# Patient Record
Sex: FEMALE | Race: BLACK OR AFRICAN AMERICAN
[De-identification: names, ages, dates, MRNs, and addresses within clinical notes are randomized per-mention and may not be internally consistent; named-entity substitution may affect disease eponyms.]

---

## 2019-09-03 NOTE — RAD
Right shoulder 3 views



HISTORY: Right shoulder pain.



FINDINGS: Acromioclavicular and glenohumeral alignment are maintained. Fracture, dislocation, or aggr
essive osseous erosions.











IMPRESSION: No acute osseous abnormalities are demonstrated.



Reported By: MAKAYLA Tompkins 

Electronically Signed:  9/3/2019 8:58 PM

## 2019-09-03 NOTE — RAD
Chest one view



HISTORY: Chest pain.



COMPARISON: 10/7/2017.



FINDINGS: Cardiac silhouette is magnified by projection. Pulmonary vasculature is unremarkable. Media
stinum is midline with aortic calcification. No lobar consolidation or evidence of pneumothorax.

Mild leftward convex curvature of the thoracic spine is unchanged in appearance. Cardiac monitor lead
s overlie the chest.











IMPRESSION: Atherosclerosis. Chronic-type findings appear stable.



No active cardiopulmonary abnormalities are otherwise demonstrated.



Reported By: MAKAYLA Tompkins 

Electronically Signed:  9/3/2019 6:44 PM

## 2019-09-04 NOTE — CT
CT NECK SOFT TISSUES WITH CONTRAST:

 

Date:  09/04/19 

 

HISTORY:  

54-year-old female with multiple enlarged lymph nodes and hoarseness. 

 

COMPARISON:  

None available. 

 

FINDINGS:

There are multiple heterogeneously enhancing, enlarged, pathological supraclavicular cervical lymph n
odes bilaterally. For example, the most anterior left Level IV supraclavicular lymph node measures ap
proximately 2 x 2.5 x 3 cm. Located more superiorly, there are several abnormal left Level V, III, an
d IV cervical lymph nodes. This includes an approximately 2 x 1.5 x 2.5 cm pathologic lymph node that
 straddles left Levels III and IV. 

 

There are suspicious conglomerations of noncalcified enlarged mediastinal lymph nodes, including righ
t paratracheal and pretracheal lymph nodes.

 

Images of the oropharynx are degraded because of patient movement, either vocalization or swallowing.
 The glottis is closed, also limiting evaluation of the larynx. Images of the submandibular glands ar
e also somewhat degraded. Because of motion artifact, it is also difficult to evaluate the pharyngeal
 mucosal space, especially oropharynx. No parotid mass. There is a prominent periapical, radicular cy
st at the right mandibular body. 

 

IMPRESSION: 

Mediastinal, bilateral supraclavicular, and left mid cervical, lymphadenopathy: suspicious and probab
ly malignant.

 

 

 

POS: [facility]

## 2019-09-04 NOTE — CT
CT OF THE CHEST, ABDOMEN AND PELVIS WITH IV CONTRAST:

9/4/19

 

INDICATIONS:

Multiple cervical lymph nodes with hoarseness and history of smoking. 

 

COMPARISON: 

CT of the abdomen and pelvis dated 10/7/17.

 

FINDINGS: 

 

CHEST:

There are numerous enlarged lymph nodes within the base of the left base of the neck. One of the larg
est is seen within the left supraclavicular region measuring 1.6 cm. 

 

There is a right paratracheal lymph node measuring 1.5 cm. There is a subcarinal lymph node measuring
 1.1 cm. 

 

There is scattered emphysema. No suspicious pulmonary nodule is identified. 

 

ABDOMEN AND PELVIS: 

There are small hepatic hypodensities. There are small gallstones within the gallbladder. Pancreas, a
drenal glands, and spleen appear within normal limits. Numerous enlarged lymph nodes within the upper
 retroperitoneal region and upper abdomen.  One of the largest seen within the portacaval region yeimy
uring 1.2 cm. There is a periaortic lymph node measuring 1.1 cm. There are small renal hypodensities 
bilaterally. 

 

The bladder is partially decompressed. The reproductive structures are surgically absent. There is mi
ld free fluid in the pelvis. 

 

There is a mild amount of retained stool within the colon. 

 

There are enlarged lymph nodes within the right upper quadrant mesentery. One of the most largest lym
ph nodes on image 66, series 2 measuring 1.3 cm. There is suggestion of wall thickening involving the
 ileocecal valve with a 1.7 cm lymph node seen within the right lower quadrant. There is wall thicken
ing involving the terminal ileum and cecum. 

 

There is scattered degenerative and osteoarthritic change. 

 

IMPRESSION: 

1.      Extensive lymphadenopathy at base of left neck, mediastinum, upper abdomen, retroperitoneal m
esenteric regions. 

 

2.      Wall thickening involving the cecum, ileocecal valve and terminal ileum may reflect an ileoce
citis. This may reflect changes of underlying inflammatory bowel disease or an infectious ileocecitis
. This also could be related to lymphomatous involvement as there are multiple enlarged lymph nodes i
nvolving the mesenteric lymph nodes near this region. 

 

3.      Mild free fluid.

 

4.      Scattered emphysema. 

 

5.      Tiny hypodensities within the liver and kidneys difficult to fully characterize due to their 
size. 

 

POS: [facility]

## 2019-09-04 NOTE — HP
REASON FOR ADMISSION:  Right-sided chest wall pain, multiple cervical lymph 
nodes

including supraclavicular node with hoarseness. 



HISTORY OF PRESENTING ILLNESS:  The patient gives history of developing right 
upper

extremity numbness.  This was associated with right-sided chest wall pain with

radiation to right upper extremity and shoulder.  This happened at work at 
[employer].  She also complains of hoarseness from last 3 weeks off and on.  She in 
fact

took antibiotics for 10 days with no relief.  She has enlarged lymph nodes, 
which

she shows me on the neck.  Her last stress test was in 2017, which 
showed

excellent exercise tolerance with no significant changes seen on the EKG.  No

complaints of fever at home.  Has currently no left-sided chest pain, 
palpitations,

or PND.  The patient mentions that she has exertional shortness of breath for 
last

few months now. 



PAST MEDICAL AND SURGICAL HISTORY:  History of asthma, hysterectomy, bipolar

disorder which is in remission per patient and is not on any medication, and

osteoporosis. 



CURRENT MEDICATIONS:  She takes:

1. Meloxicam 15 mg p.r.n.

2. Alendronate 70 mg once weekly.



PERSONAL HISTORY:  Smokes 1 pack a day and has been doing so for the last 30 
years.

Uses marijuana.  She also drinks nearly 30 beers on the weekends.  Denies other

drugs. 



FAMILY HISTORY:  Both parents  in their 70s.  Mother had history of bone 
cancer.

 Father  of multiple MIs.  The patient works at [employer].  She 
ambulates

by herself. 



CODE STATUS:  Full.  Power of  is her daughter, Ms. Karri Rojas, 
number

to reach her is [phone]. 



REVIEW OF SYSTEMS:  CONSTITUTIONAL:  Negative for weight loss or gain, ability 
to

conduct usual activities. 

SKIN:  Negative for rash, itching. 

EYES:  Negative for double vision, pain. 

ENT/MOUTH:  Negative for nose bleeding, neck stiffness, pain, tenderness. 

CARDIOVASCULAR:  Negative for palpitations, dyspnea on exertion, orthopnea. 

RESPIRATORY:  Negative for shortness of breath, wheezing, cough, hemoptysis, 
fever

or night sweats. 

GASTROINTESTINAL:  Negative for poor appetite, abdominal pain, heartburn, nausea
,

vomiting, constipation, or diarrhea. 

GENITOURINARY:  Negative for urgency, frequency, dysuria, nocturia. 

MUSCULOSKELETAL:  Negative for pain, swelling. 

NEUROLOGIC/PSYCHIATRIC:  Negative for anxiety, depression. 

ALLERGY/IMMUNOLOGIC:  Negative for skin rash, bleeding tendency.



PHYSICAL EXAMINATION:

GENERAL:  The patient is a 54-year-old female, who is currently not in any acute

distress. 

VITAL SIGNS:  Blood pressure 130/84, pulse 100 per minute, respiratory rate is 
18

per minute, temperature 97.9 degrees Fahrenheit, and saturating 98% on room 
air. 

NECK:  The patient has multiple lymph nodes in the cervical chain on both sides.

Left, there is a supraclavicular enlarged hard lymph node.  She also has 
surrounding

matted lymph nodes as well.  No elevated JVD. 

CARDIOVASCULAR SYSTEM:  S1 and S2 heard.  Regular rhythm. 

HEENT:  Oral cavity, mucous membranes are moist.  The patient appears to have a

possible mass on the lateral aspect of the posterior one-third of tongue.  This 
area

likely has chronic pigmentation as well, which has been present for years per

patient.  No enlargement of tonsils was seen. 

RESPIRATORY:  Air entry 1+ bilateral.  Scattered rhonchi plus no wheezes. 

ABDOMEN:  Soft.  Bowel sounds heard.  No tenderness, rigidity, or guarding. 

EXTREMITIES:  No peripheral edema or calf tenderness. 

VASCULAR SYSTEM:  Peripheral pulses 1+ bilateral.  No ischemic ulcerations or

gangrene. 

CENTRAL NERVOUS SYSTEM:  The patient has hoarseness and mild drooping of the 
left

half of the soft palate.  No other gross focal deficits noted. 

PSYCHIATRIC:  The patient's mood is euthymic.  No hallucinations or delusions.



LABORATORY DATA:  White count of 6, hemoglobin and hematocrit of 14 and 40, 
platelet

count 361, MCV is 97 with 56% neutrophils.  Serum bicarb 18, BUN 4, creatinine 
0.6,

serum glucose 123.  AST 37, ALT 15, and alkaline phosphatase 102.  Troponin x3

negative.  Albumin is 3.8.  A chest x-ray done, one view shows no lobar

consolidation or evidence of pneumothorax.  There is a mild leftward convex

curvature of the thoracic spine.  Right shoulder three view x-rays done, show no

acute osseous abnormalities.  EKG done shows sinus tach at 109 beats per minute 
with

poor R-wave progression. 



CLINICAL IMPRESSION AND PLAN:  The patient is currently under observation on

telemetry.  We will obtain CT of the chest, abdomen, and pelvis along with soft

tissue neck.  The patient has enlarged supraclavicular lymph node, which is hard

along with other lymph nodes and hoarseness as well.  She is a heavy smoker as 
well

and drinks heavily on the weekends as well.  She will be kept n.p.o.  I have

discussed her findings with Dr. Alan Jose, who will evaluate the patient.  
We will

follow up on the CAT scan results.  If the patient were to have abnormal 
findings,

she will be switched over to inpatient status for further workup.  She will be 
on

DuoNeb q.6 hourly, Pepcid 20 mg twice daily, and gentle hydration with normal 
saline

at 80 mL per hour.  We will also consult Dr. Doss, who is on-call for GI 
for

possible upper endoscopy.  The patient likely will need triple endoscopy if her 
CAT

scan shows any abnormalities. 







Job ID:  [id number]



[facility]

## 2019-09-05 NOTE — CON
DATE OF CONSULTATION:  2019



REASON FOR CONSULTATION:  Abdominal discomfort.



HISTORY OF PRESENT ILLNESS:  Ms. Michael Rojas is a fragile looking 

female hospitalized in the ER today.  The patient came to the ER, because of pain

over the right shoulder joint and also diffuse numbness over the right arm.  The

patient hospitalized, because of the above reason.  The patient was hospitalized 2

years ago with numbness and pain over the left side of chest, left shoulder, left

arm.  At that time, she had a MRI of the head and CT of the head was negative.  She

also had chest pain at that time and she had negative stress testing.  During the

last admission, patient noted to have markedly elevated blood pressure.  However,

she says her blood pressure did normalize since that discharge and she is not taking

any medication.  The patient has history of bipolar disorder.  The patient also had

previous hysterectomy in the past.  The patient tells me the pain started 24 hours

earlier and the pain is over the right shoulder area.  She also has numbness over

the right arm and right hand.  The patient has no headache, but for the pain and

numbness of the right shoulder and arm, she has no other specific symptoms.  No

headache.  No diplopia.  No hearing loss.  No new neurologic symptoms.  The patient

was seen by Dr. Daugherty and on physical exam, she was found to be mildly tender

all over abdomen.  However, she states she has no abdominal pain, but when somebody

feels her abdomen, she is sore.  History of asthma, COPD, has chronic cough.  She

states she has been coughing a lot recently.  She feels that the pain and the

soreness was because of the chronic coughing.  Her bowel movements are regular.

There is no abdominal pain, no nausea, vomiting.  No history of any heartburn, no

indigestion.  No rectal bleeding.  No melena.  She really has no GI symptoms. 



ALLERGIES:  NONE.



SOCIAL HISTORY:  The patient smokes marijuana frequently.  She also smokes one pack

of cigarettes per day.  She drinks 2-3 beers several times a week. 



MEDICAL ILLNESSES:  

1. Bipolar disorder.

2. Hysterectomy.

3. Admission in 2017 for numbness of the left arm and had a negative CAT scan of the

head and MRA.  She also had a negative stress test at that time. 



FAMILY HISTORY:  Mother had bone cancer.  Father  of coronary artery disease.



MEDICATIONS UPON ADMISSION:  Include:

1. Meloxicam 50 mg p.r.n.

2. Fosamax 70 once a week.



REVIEW OF SYSTEMS:  A 10-point system review: 

CONSTITUTIONAL:  No history of any weight loss.  No fever.  She has good exercise

tolerance. 

HEENT:  No headache.  No dizziness.  Eyes:  No diplopia, no impaired vision.  Ears:

No hearing loss, no pain. No nosebleed.  No sore throat. 

LUNGS:  History of asthma, chronic coughing and wheezing. 

CARDIOVASCULAR:  No chest pain.  No palpitation.  No orthopnea or PND. 

GI:  No abdominal pain, no nausea, no vomiting, no hematochezia.  However, when

palpated, she feels mildly sore. 

:  Nonrelevant.   

NEUROPSYCHIATRIC:  History of bipolar disorder.   

ENDOCRINE/HEMATOLOGICAL:  Nothing relevant.



PHYSICAL EXAMINATION:

GENERAL:  She appears very comfortable.  She is thin built, in no distress.   

VITAL SIGNS:  Afebrile, pulse is 87, blood pressure is 188/87. 

EYES:  Conjunctivae clear. 

NECK:  Supple.  No thyromegaly noted. 

CARDIOVASCULAR:  First and second heart sounds heard. 

LUNGS:  Clear to auscultation. 

ABDOMEN:  Soft.  She has an umbilical hernia, reviewed.  Abdomen is mildly swollen

all over the abdomen.  There is no rebound or guarding.  No organomegaly or masses.

Bowel sounds are normal. 

EXTREMITIES:  Reveal no edema.



IMAGING:  Chest CAT scan, abdominal CAT scan shows diffuse lymphadenopathy in the

neck within the chest and also retroperitoneum. 



CLINICAL IMPRESSION:  A 54-year-old  female with pain over the right

shoulder with numbness  __________ She had similar episodes of left-sided numbness

and pain 2 years ago.  At that time, a CAT scan of the head and MRI were negative.

She was found to have chest pain at that time and she had negative stress testing.

She has no abdominal symptoms except feeling sore in her abdomen.  It could very

well be because of chronic coughing, the exam is very benign. 



LABORATORY DATA:  Shows today WBC 6700, hemoglobin 14.2, hematocrit 40.2, MCV 97.8,

platelet count is 361,000, polymorphs 56, lymphocytes 24, monocytes 7, serum

chemistries are normal.  Chem 7:  Glucose 123, calcium 9.1, bilirubin 0.3, AST is

37, ALT 15, alkaline phosphatase is 102, albumin is 3.8. 



IMAGING:  The CAT scan of the abdomen and chest does reveal diffuse lymphadenopathy

including the neck, retroperitoneal area and also chest.  She also has wall

thickening of the right colon, but she has really no specific GI symptoms.  She also

has  __________. 



CLINICAL IMPRESSION:  

1. A 54-year-old  female with pain over the right shoulder with some

numbness in the right hand.  She has had an abdominal CAT scan and chest CAT scan.

It shows diffuse lymphadenopathy.  At the present time, the abdominal exam is very

benign, she does have umbilical hernia, which I reviewed.  Abdomen is mildly tender,

nonspecific. 

2. History of bipolar disorder.

3. Hysterectomy.

4. Drug use-chronic smoker.



RECOMMENDATION:  Surgical consult for  __________ biopsy.  I believe she probably

should have some endoscopic studies before discharge, because of her age and she

needs screening colonoscopy.   __________ biopsy and then decide course of further

therapy. 







Job ID:  [id number]

## 2019-09-05 NOTE — CON
DATE OF CONSULTATION:  



SUBJECTIVE:  Ms. Rojas was admitted for a mass in the supraclavicular region of her

neck.  She has a history of smoking, was asked for ENT consult to assess whether

there is a primary source at the base of tongue or in the pharyngeal region. 



OBJECTIVE:  GENERAL:  The patient is well developed, well nourished.  She is in no

acute distress. 

VITAL SIGNS:  Stable. 

NECK:  Palpation of the neck reveals left-sided supraclavicular mass, it is

approximately 4 cm, hard, non-mobile, slightly fixed.  Flexible scope was used to

look at the entire nasopharyngeal region including larynx, base of tongue.  No

lesions were seen in any of these areas. 



ASSESSMENT:  

1. Neck mass, supraclavicular.

2. History of smoking.



PLAN:  Okay to discharge to home at this time per Ear, Nose, and Throat.  Dr. Jose

will perform an excisional biopsy of mass on Wednesday, 09/11.  The patient may come

to the office for preop visit if desired. 







Job ID:  [id number]

## 2019-09-05 NOTE — DIS
DATE OF ADMISSION:  09/03/2019



DATE OF DISCHARGE:  09/05/2019



PRIMARY CARE PHYSICIAN:  Dr. Shen montgomery.



DISCHARGE DISPOSITION:  Home.



PRIMARY DISCHARGE DIAGNOSES:  Multiple cervical lymph nodes, mediastinal

lymphadenopathy, and upper abdomen and retroperitoneal mesenteric region

adenopathies as well, for further evaluation to rule out malignancy. 



SECONDARY DISCHARGE DIAGNOSES:  Hoarseness of voice, likely due to impingement 
of

recurrent laryngeal nerve by lymphadenopathy, hypertension, suspected chronic

obstructive pulmonary disease, tobacco abuse. 



PROCEDURES DONE DURING HOSPITALIZATION:  CT neck soft tissues with contrast done

showed mediastinal, bilateral supraclavicular, and left mid cervical

lymphadenopathy, suspicious and probably malignant.  CT chest, abdomen, and 
pelvis

with contrast done showed extensive lymphadenopathy at base of left neck,

mediastinum, upper abdomen, retroperitoneal, mesenteric regions.  She also had 
some

wall thickening involving cecum, ileocecal valve, and terminal ileum.  Scattered

emphysema was seen.  Chest x-ray showed chronic findings, otherwise no acute

infiltrate.  The right shoulder three-view x-ray done showed no acute osseous

abnormality.  No fracture, dislocation, or aggressive osseous erosions were 
seen.

Respiratory virus panel PCR was negative.  Nasopharyngeal swab grew normal

respiratory akira.  H and H of 13 and 39, platelet count 315.  BUN 4, creatinine

0.5.  Troponin x3 negative.  Liver enzymes were within normal limits.  Total

bilirubin 0.6. 



DISCHARGE MEDICATION:  Albuterol inhaler q.6 hourly p.r.n.



INPATIENT CONSULT:  

1. Dr. Alan Jose for ENT.

2. Dr. Doss for Gastroenterology.



BRIEF COURSE DURING HOSPITALIZATION:  The patient initially came in with 
complaints

of right shoulder pain and chest pain.  She was also found to be having palpable

cervical lymphadenopathy including supraclavicular nodes and hoarseness.  The

patient has had CT neck and CT of the chest, abdomen, and pelvis obtained, which

have all revealed multiple lymph nodes suspicious for malignancy.  She has had 
ENT

evaluation done by Dr. Alan Jose.  The patient is for cervical lymph node

excision biopsy on Wednesday.  She has been scheduled for outpatient surgery in 
the

OR by Dr. Alan Jose.  Once tissue is available, further diagnostic workup 
will

be done.  Likely, the patient will need triple endoscopy if this turns out to be

squamous cell carcinoma.  If not, this is highly suspicious for lymphoma.  Ms. Aruna Morrissey nurse practitioner for Oncology is also aware of the patient, and ENT

physician, Dr. Alan Jose will consult outpatient Oncology if this turns out 
to

be malignant.  She has been given a prescription for albuterol inhaler.  She was

counseled with regard to tobacco cessation.  Also, binge drinking on the 
weekends,

she was counseled with regard to that as well.  She is otherwise hemodynamically

stable and will be shortly discharged home to follow up with Dr. Alan Jose 
on

Wednesday for excision biopsy of the cervical lymph node. Please note I have 
seen

and examined patient on the day of discharge.







Job ID:  [id number]



[facility]

## 2019-09-05 NOTE — PDOC.HOSPP
- Subjective


Encounter Date: 09/05/19


Encounter Time: 11:00


Subjective: 





no trouble swallowing or breathing





- Objective


Vital Signs & Weight: 


 Vital Signs (12 hours)











  Temp Pulse Resp BP Pulse Ox


 


 09/05/19 08:00  97.8 F  74  18  136/74  98


 


 09/05/19 07:56   83  16   99


 


 09/05/19 03:32  98.5 F  82  16  144/81 H  98








 Weight











Admit Weight                   102 lb 9.615 oz


 


Weight                         102 lb 9.615 oz














I&O: 


 











 09/04/19 09/05/19 09/06/19





 06:59 06:59 06:59


 


Intake Total 120 1200 


 


Balance 120 1200 











Result Diagrams: 


 09/05/19 04:26





 09/05/19 04:26





Hospitalist ROS





- Medication


Medications: 


Active Medications











Generic Name Dose Route Start Last Admin





  Trade Name Freq  PRN Reason Stop Dose Admin


 


Albuterol/Ipratropium  3 ml  09/04/19 13:00  09/05/19 07:56





  Duoneb  NEB   3 ml





  [id number] OWEN   Administration





     





     





     





     


 


Enoxaparin Sodium  40 mg  09/05/19 09:00  09/05/19 08:55





  Lovenox  SC   Not Given





  0900 OWEN   





     





     





     





     


 


Famotidine  20 mg  09/04/19 21:00  09/05/19 08:56





  Pepcid  PO   20 mg





  BID OWEN   Administration





     





     





     





     


 


Sodium Chloride  1,000 mls @ 80 mls/hr  09/04/19 09:49  09/05/19 01:00





  Normal Saline 0.9%  IV   1,000 mls





  .[id number] OWEN   Administration





     





     





     





     














- Exam


General Appearance: awake alert, ill appearing


Eye: PERRL, anicteric sclera


ENT: normocephalic atraumatic, moist mucosa


Neck: no JVD


Neck - other findings: multiple lymph nodes including supraclavicular


Heart: RRR, no gallops


Respiratory: no wheezes, no rales, rhonchi


Gastrointestinal: soft, non-tender, normal bowel sounds


Extremities: no cyanosis, no edema


Neurological: CN's grossly intact, no focal deficits


Psychiatric: normal affect, A&O x 3





Hosp A/P


(1) Cervical lymphadenopathy


Code(s): R59.0 - LOCALIZED ENLARGED LYMPH NODES   Status: Acute   





(2) COPD (chronic obstructive pulmonary disease)


Status: Suspected   


Qualifiers: 


   COPD type: chronic bronchitis 





(3) HTN (hypertension)


Code(s): I10 - ESSENTIAL (PRIMARY) HYPERTENSION   Status: Chronic   


Qualifiers: 


   Hypertension type: essential hypertension   Qualified Code(s): I10 - 

Essential (primary) hypertension   





(4) Hoarseness of voice


Status: Acute   





(5) Tobacco abuse


Code(s): Z72.0 - TOBACCO USE   Status: Chronic   





- Plan





ENT will evaluate her today


Dr.Steven Jose has posted her for lymph node biopsy on wednesday


will eventually need triple endoscopy


has multiple lymph nodes in neck, mediastinum and abd as well suggestive of 

malignancy


DC plan per ENT advice this afternoon, they will have to refer her to Onc/

respective consults based on Bx results.


hemostable


d/w patient in detail about CT and clinical findings and upcoming test (Bx)

## 2019-09-27 NOTE — PET
Exam: 

PET SCAN WITH CT ATTENUATION CORRECTION:



HISTORY: Left neck mass, possibly metastatic lymph node from a primary colon cancer.



COMPARISON: None.



CORRELATION: Soft tissue neck, chest/abdomen and pelvic CT 9/4/2019.



TECHNIQUE: PET scan with CT attenuation correction performed from the base of the brain to the proxim
al thighs following the intravenous administration of 10.3 mCi of [id number].



FINDINGS:



Head/Neck:

Hypermetabolic left periclavicular lymph nodes with a maximum SUV between 5.2 and 6.9.



Chest:

Enlarged hypermetabolic right paratracheal lymph node with a maximum SUV of 3.8, precarinal lymph nod
e with a maximum SUV of 4.2, subcarinal lymph node with a maximum SUV of 2.9.   CT used for

attenuation correction does not demonstrate any lung masses or nodules. 



Abdomen/Pelvis:

Increased retroperitoneal FDG avidity involving periaortic and aortocaval lymph nodes. Representative
 hypermetabolic lymph nodes are noted left periaortic region. The greatest degree of FDG avidity is

4.0. Additional lymph nodes have a maximum SUV between 2.3 and 2.7 suggesting upper normal/slightly h
ypermetabolic lymph nodes. 

There is increased FDG avidity involving lymph nodes within the mesentery with maximum SUV between  3
.4 and 4.6.

There is FDG avidity involving the right hemicolon. Recent CT described mucosal thickening. FDG avidi
ty in this region is 4.0. Correlate for infectious, inflammatory or neoplastic process.

CT used for attenuation correction demonstrates a ventral abdominal wall hernia.

There is a hypermetabolic left external iliac lymph node with maximum  SUV of 3.4.



Osseous structures: 

No abnormal FDG avidity.



IMPRESSION:

1. Hypermetabolic lymph node  left periclavicular region.

2. Hypermetabolic mediastinal lymph nodes.

3. Hypermetabolic mesenteric and retroperitoneal lymph nodes.

4. Hypermetabolic left external iliac lymph node.

5. Hypermetabolic activity involving the right hemicolon. CT used for attenuation correction demonstr
ates bowel wall thickening. Correlate for infectious, inflammatory or malignant process.



Transcribed Date/Time: 9/27/2019 11:18 AM



Reported By: Shon Pinedo 

Electronically Signed:  9/27/2019 1:56 PM

## 2019-10-03 ENCOUNTER — HOSPITAL ENCOUNTER (OUTPATIENT)
Dept: HOSPITAL 92 - SDC | Age: 54
Discharge: HOME | End: 2019-10-03
Attending: SURGERY
Payer: COMMERCIAL

## 2019-10-03 DIAGNOSIS — Z79.899: ICD-10-CM

## 2019-10-03 DIAGNOSIS — Z79.1: ICD-10-CM

## 2019-10-03 DIAGNOSIS — J44.9: ICD-10-CM

## 2019-10-03 DIAGNOSIS — C79.9: ICD-10-CM

## 2019-10-03 DIAGNOSIS — M81.0: ICD-10-CM

## 2019-10-03 DIAGNOSIS — F17.210: ICD-10-CM

## 2019-10-03 DIAGNOSIS — C18.9: Primary | ICD-10-CM

## 2019-10-03 LAB
ANION GAP SERPL CALC-SCNC: 12 MMOL/L (ref 10–20)
BASOPHILS # BLD AUTO: 0.1 THOU/UL (ref 0–0.2)
BASOPHILS NFR BLD AUTO: 0.7 % (ref 0–1)
BUN SERPL-MCNC: 6 MG/DL (ref 9.8–20.1)
CALCIUM SERPL-MCNC: 9.2 MG/DL (ref 7.8–10.44)
CHLORIDE SERPL-SCNC: 112 MMOL/L (ref 98–107)
CO2 SERPL-SCNC: 22 MMOL/L (ref 22–29)
CREAT CL PREDICTED SERPL C-G-VRATE: 74 ML/MIN (ref 70–130)
EOSINOPHIL # BLD AUTO: 0.9 THOU/UL (ref 0–0.7)
EOSINOPHIL NFR BLD AUTO: 11.6 % (ref 0–10)
GLUCOSE SERPL-MCNC: 94 MG/DL (ref 70–105)
HGB BLD-MCNC: 13.8 G/DL (ref 12–16)
LYMPHOCYTES # BLD: 1.9 THOU/UL (ref 1.2–3.4)
LYMPHOCYTES NFR BLD AUTO: 24.7 % (ref 21–51)
MCH RBC QN AUTO: 33.1 PG (ref 27–31)
MCV RBC AUTO: 96.2 FL (ref 78–98)
MONOCYTES # BLD AUTO: 0.6 THOU/UL (ref 0.11–0.59)
MONOCYTES NFR BLD AUTO: 7.8 % (ref 0–10)
NEUTROPHILS # BLD AUTO: 4.1 THOU/UL (ref 1.4–6.5)
NEUTROPHILS NFR BLD AUTO: 55.3 % (ref 42–75)
PLATELET # BLD AUTO: 401 THOU/UL (ref 130–400)
POTASSIUM SERPL-SCNC: 3.9 MMOL/L (ref 3.5–5.1)
RBC # BLD AUTO: 4.17 MILL/UL (ref 4.2–5.4)
SODIUM SERPL-SCNC: 142 MMOL/L (ref 136–145)
WBC # BLD AUTO: 7.5 THOU/UL (ref 4.8–10.8)

## 2019-10-03 PROCEDURE — 85025 COMPLETE CBC W/AUTO DIFF WBC: CPT

## 2019-10-03 PROCEDURE — 0JH63WZ INSERTION OF TOTALLY IMPLANTABLE VASCULAR ACCESS DEVICE INTO CHEST SUBCUTANEOUS TISSUE AND FASCIA, PERCUTANEOUS APPROACH: ICD-10-PCS | Performed by: SURGERY

## 2019-10-03 PROCEDURE — 80048 BASIC METABOLIC PNL TOTAL CA: CPT

## 2019-10-03 PROCEDURE — 71045 X-RAY EXAM CHEST 1 VIEW: CPT

## 2019-10-03 PROCEDURE — C1788 PORT, INDWELLING, IMP: HCPCS

## 2019-10-03 PROCEDURE — 36415 COLL VENOUS BLD VENIPUNCTURE: CPT

## 2019-10-03 PROCEDURE — 02HV33Z INSERTION OF INFUSION DEVICE INTO SUPERIOR VENA CAVA, PERCUTANEOUS APPROACH: ICD-10-PCS | Performed by: SURGERY

## 2019-10-03 NOTE — PDOC.OP
Operative Note





- Operative Note


Operative Note: 





PROCEDURE:  Right internal jugular MediPort placement with ultrasound and 

fluoroscopic guidance





DATE OF PROCEDURE: 





SURGEON: Dacia Tillman M.D.





PREOPERATIVE DIAGNOSIS: Metastatic colon cancer





POSTOPERATIVE DIAGNOSIS: Metastatic colon cancer





HISTORY: Patient has been diagnosed with metastatic colon cancer. Chemotherapy 

has been recommended for palliative purposes and a Mediport has been requested 

for this. 





OPERATIVE PROCEDURE IN DETAIL:  After informed consent was obtained and 

appropriate preoperative antibiotics administered, the patient was taken to the 

operating room and placed in supine position and monitored anesthesia care was 

administered. The patient was then placed in Trendelenburg position and the 

patent compressible right internal jugular vein accessed easily on the first 

attempt under direct ultrasound guidance with excellent flow of dark venous non-

pulsatile blood. There were some enlarged cervical lymph nodes surrounding the 

vein but the vein was patent.  A wire threaded easily and was confirmed to be 

in the compressible vein by ultrasound and with the tip in the superior vena 

cava by fluoroscopy.  Additional local anesthesia was infused to the skin and 

subcutaneous tissues of the right neck and chest.  A skin incision was made on 

the right chest and a subcutaneous pocket developed inferiorly. A Mediport was 

obtained and confirmed to fit in the subcutaneous pocket.  This was secured 

inferiorly to the pectoralis fascia with a Prolene suture, which was clamped, 

but not tied.  Mediport tubing was then tunneled from the chest to the right IJ 

access site subcutaneously. The dilator and sheath were then placed over the 

wire and the dilator and wire removed leaving the sheath in place.  The clamped 

MediPort tubing was tunneled through the sheath, which was then split and 

removed leaving the MediPort tubing in place.  The tubing was adjusted until 

the tip was confirmed by fluoroscopy to be in the superior vena cava just above 

the atrium.  The tubing was clamped at the skin  level and cut and the tubing 

secured to the port, which was then placed in the subcutaneous pocket.  The 

previously placed suture was secured and two additional sutures were placed to 

fix the port in place within the pocket.  The port was aspirated with the Mustafa 

needle and had excellent flow of dark venous non-pulsatile blood and easily 

flushed without resistance.  The subcutaneous tissues were closed with a 

running Monocryl suture, following which the skin was closed with a running 

subcuticular Monocryl suture.  Dermabond dressings were placed.  The course of 

the catheter was confirmed by fluoroscopy to be smooth with the tip 

appropriately located in the superior vena cava.  The patient was taken back to 

recovery in good condition.  Estimated blood loss was minimal.  There were no 

complications.  There were no specimens.

## 2019-10-07 ENCOUNTER — HOSPITAL ENCOUNTER (OUTPATIENT)
Dept: HOSPITAL 92 - ONC/OP | Age: 54
Discharge: HOME | End: 2019-10-07
Attending: INTERNAL MEDICINE
Payer: COMMERCIAL

## 2019-10-07 DIAGNOSIS — Z79.899: ICD-10-CM

## 2019-10-07 DIAGNOSIS — Z51.11: Primary | ICD-10-CM

## 2019-10-07 DIAGNOSIS — C18.8: ICD-10-CM

## 2019-10-07 PROCEDURE — 96367 TX/PROPH/DG ADDL SEQ IV INF: CPT

## 2019-10-07 PROCEDURE — 96415 CHEMO IV INFUSION ADDL HR: CPT

## 2019-10-07 PROCEDURE — 96417 CHEMO IV INFUS EACH ADDL SEQ: CPT

## 2019-10-07 PROCEDURE — 96375 TX/PRO/DX INJ NEW DRUG ADDON: CPT

## 2019-10-07 PROCEDURE — 96413 CHEMO IV INFUSION 1 HR: CPT

## 2019-10-21 ENCOUNTER — HOSPITAL ENCOUNTER (OUTPATIENT)
Dept: HOSPITAL 92 - ONC/OP | Age: 54
Discharge: HOME | End: 2019-10-21
Attending: INTERNAL MEDICINE
Payer: COMMERCIAL

## 2019-10-21 VITALS — DIASTOLIC BLOOD PRESSURE: 80 MMHG | SYSTOLIC BLOOD PRESSURE: 160 MMHG

## 2019-10-21 DIAGNOSIS — Z51.11: Primary | ICD-10-CM

## 2019-10-21 DIAGNOSIS — C77.0: ICD-10-CM

## 2019-10-21 DIAGNOSIS — C18.8: ICD-10-CM

## 2019-10-21 LAB
ALBUMIN SERPL BCG-MCNC: 3.7 G/DL (ref 3.5–5)
ALP SERPL-CCNC: 103 U/L (ref 40–110)
ALT SERPL W P-5'-P-CCNC: 9 U/L (ref 8–55)
ANION GAP SERPL CALC-SCNC: 10 MMOL/L (ref 10–20)
AST SERPL-CCNC: 19 U/L (ref 5–34)
BASOPHILS # BLD AUTO: 0.1 THOU/UL (ref 0–0.2)
BASOPHILS NFR BLD AUTO: 0.9 % (ref 0–1)
BILIRUB SERPL-MCNC: 0.2 MG/DL (ref 0.2–1.2)
BUN SERPL-MCNC: 6 MG/DL (ref 9.8–20.1)
CALCIUM SERPL-MCNC: 9.2 MG/DL (ref 7.8–10.44)
CHLORIDE SERPL-SCNC: 105 MMOL/L (ref 98–107)
CO2 SERPL-SCNC: 26 MMOL/L (ref 22–29)
CREAT CL PREDICTED SERPL C-G-VRATE: 79 ML/MIN (ref 70–130)
EOSINOPHIL # BLD AUTO: 0.3 THOU/UL (ref 0–0.7)
EOSINOPHIL NFR BLD AUTO: 4.4 % (ref 0–10)
GLOBULIN SER CALC-MCNC: 3.8 G/DL (ref 2.4–3.5)
GLUCOSE SERPL-MCNC: 89 MG/DL (ref 70–105)
HGB BLD-MCNC: 13.2 G/DL (ref 12–16)
LYMPHOCYTES # BLD: 2.1 THOU/UL (ref 1.2–3.4)
LYMPHOCYTES NFR BLD AUTO: 28.6 % (ref 21–51)
MCH RBC QN AUTO: 32.7 PG (ref 27–31)
MCV RBC AUTO: 96.1 FL (ref 78–98)
MONOCYTES # BLD AUTO: 0.7 THOU/UL (ref 0.11–0.59)
MONOCYTES NFR BLD AUTO: 10.2 % (ref 0–10)
NEUTROPHILS # BLD AUTO: 4 THOU/UL (ref 1.4–6.5)
NEUTROPHILS NFR BLD AUTO: 56 % (ref 42–75)
PLATELET # BLD AUTO: 372 THOU/UL (ref 130–400)
POTASSIUM SERPL-SCNC: 3.8 MMOL/L (ref 3.5–5.1)
RBC # BLD AUTO: 4.05 MILL/UL (ref 4.2–5.4)
SODIUM SERPL-SCNC: 137 MMOL/L (ref 136–145)
WBC # BLD AUTO: 7.2 THOU/UL (ref 4.8–10.8)

## 2019-10-21 PROCEDURE — 96415 CHEMO IV INFUSION ADDL HR: CPT

## 2019-10-21 PROCEDURE — 96375 TX/PRO/DX INJ NEW DRUG ADDON: CPT

## 2019-10-21 PROCEDURE — 85025 COMPLETE CBC W/AUTO DIFF WBC: CPT

## 2019-10-21 PROCEDURE — 96367 TX/PROPH/DG ADDL SEQ IV INF: CPT

## 2019-10-21 PROCEDURE — 96417 CHEMO IV INFUS EACH ADDL SEQ: CPT

## 2019-10-21 PROCEDURE — 96413 CHEMO IV INFUSION 1 HR: CPT

## 2019-10-21 PROCEDURE — 80053 COMPREHEN METABOLIC PANEL: CPT

## 2019-10-21 RX ADMIN — LEUCOVORIN CALCIUM SCH MLS: 350 INJECTION, POWDER, LYOPHILIZED, FOR SOLUTION INTRAMUSCULAR; INTRAVENOUS at 10:15

## 2019-10-21 RX ADMIN — LEUCOVORIN CALCIUM SCH MLS: 350 INJECTION, POWDER, LYOPHILIZED, FOR SOLUTION INTRAMUSCULAR; INTRAVENOUS at 10:24

## 2019-11-04 ENCOUNTER — HOSPITAL ENCOUNTER (OUTPATIENT)
Dept: HOSPITAL 92 - ONC/OP | Age: 54
Discharge: HOME | End: 2019-11-04
Attending: INTERNAL MEDICINE
Payer: COMMERCIAL

## 2019-11-04 VITALS — SYSTOLIC BLOOD PRESSURE: 141 MMHG | TEMPERATURE: 98 F | DIASTOLIC BLOOD PRESSURE: 90 MMHG

## 2019-11-04 DIAGNOSIS — C18.8: ICD-10-CM

## 2019-11-04 DIAGNOSIS — C77.0: ICD-10-CM

## 2019-11-04 DIAGNOSIS — Z51.11: Primary | ICD-10-CM

## 2019-11-04 PROCEDURE — 96415 CHEMO IV INFUSION ADDL HR: CPT

## 2019-11-04 PROCEDURE — 96417 CHEMO IV INFUS EACH ADDL SEQ: CPT

## 2019-11-04 PROCEDURE — 96367 TX/PROPH/DG ADDL SEQ IV INF: CPT

## 2019-11-04 PROCEDURE — 82378 CARCINOEMBRYONIC ANTIGEN: CPT

## 2019-11-04 PROCEDURE — 96375 TX/PRO/DX INJ NEW DRUG ADDON: CPT

## 2019-11-04 PROCEDURE — 96413 CHEMO IV INFUSION 1 HR: CPT

## 2019-12-09 ENCOUNTER — HOSPITAL ENCOUNTER (OUTPATIENT)
Dept: HOSPITAL 92 - ONC/OP | Age: 54
Discharge: HOME | End: 2019-12-09
Attending: INTERNAL MEDICINE
Payer: COMMERCIAL

## 2019-12-09 VITALS — TEMPERATURE: 98.4 F | DIASTOLIC BLOOD PRESSURE: 78 MMHG | SYSTOLIC BLOOD PRESSURE: 125 MMHG

## 2019-12-09 DIAGNOSIS — Z51.11: Primary | ICD-10-CM

## 2019-12-09 DIAGNOSIS — C77.0: ICD-10-CM

## 2019-12-09 DIAGNOSIS — C18.8: ICD-10-CM

## 2019-12-09 PROCEDURE — 96367 TX/PROPH/DG ADDL SEQ IV INF: CPT

## 2019-12-09 PROCEDURE — 80053 COMPREHEN METABOLIC PANEL: CPT

## 2019-12-09 PROCEDURE — 96415 CHEMO IV INFUSION ADDL HR: CPT

## 2019-12-09 PROCEDURE — 96413 CHEMO IV INFUSION 1 HR: CPT

## 2019-12-09 PROCEDURE — 96375 TX/PRO/DX INJ NEW DRUG ADDON: CPT

## 2019-12-09 PROCEDURE — 84550 ASSAY OF BLOOD/URIC ACID: CPT

## 2019-12-09 PROCEDURE — 82248 BILIRUBIN DIRECT: CPT

## 2019-12-09 PROCEDURE — 82378 CARCINOEMBRYONIC ANTIGEN: CPT

## 2019-12-09 PROCEDURE — 36415 COLL VENOUS BLD VENIPUNCTURE: CPT

## 2019-12-09 PROCEDURE — 83615 LACTATE (LD) (LDH) ENZYME: CPT

## 2019-12-09 PROCEDURE — 96417 CHEMO IV INFUS EACH ADDL SEQ: CPT

## 2019-12-09 PROCEDURE — 84100 ASSAY OF PHOSPHORUS: CPT

## 2019-12-20 ENCOUNTER — HOSPITAL ENCOUNTER (OUTPATIENT)
Dept: HOSPITAL 92 - BICCT | Age: 54
Discharge: HOME | End: 2019-12-20
Attending: INTERNAL MEDICINE
Payer: MEDICAID

## 2019-12-20 DIAGNOSIS — C18.8: Primary | ICD-10-CM

## 2019-12-20 DIAGNOSIS — C77.0: ICD-10-CM

## 2019-12-20 DIAGNOSIS — K22.8: ICD-10-CM

## 2019-12-20 DIAGNOSIS — R59.0: ICD-10-CM

## 2019-12-20 DIAGNOSIS — K63.89: ICD-10-CM

## 2019-12-20 DIAGNOSIS — K44.9: ICD-10-CM

## 2019-12-20 PROCEDURE — 71260 CT THORAX DX C+: CPT

## 2019-12-20 PROCEDURE — 74177 CT ABD & PELVIS W/CONTRAST: CPT

## 2019-12-20 NOTE — CT
CT CHEST AND ABDOMEN AND PELVIS WITH IV CONTRAST:

 

Oral contrast was given. Multiplanar reconstruction. 

 

INDICATION:

Malignant neoplasm colon. Secondary malignant neoplasm of left neck, supraclavicular, and upper thora
cic lymph nodes. 

 

COMPARISON:  

CT chest, abdomen, and pelvis dated 09/04/19. 

 

FINDINGS:

 

CT CHEST:

The adenopathy in the left supraclavicular/lower left neck region is again noted. A large lymph node 
on the left seen previously posterior to the left IVC compressing the IVC is again seen, but appears 
to have decreased in size. It measures 1.2 width today with prior measurement of 1.6 cm. An adjacent 
lymph node just posterior to this larger node is also decreased in size, measuring approximately 1.0 
cm today, with prior measurements approximately 1.8 cm AP. 

 

Adenopathy in the mediastinum is again seen. The paratracheal adenopathy is again noted, although it 
is less bulky today when compared to the recent exam of 09/04/19. A large paratracheal lymph node is 
measured at 1.3 cm today, with prior measurements approximately 1.5 cm. Other adjacent lymph nodes ar
e slightly less pronounced. A right subcarinal lymph node has AP dimension of 1.0 cm and this is unch
anged. 

 

Lung fields remain clear. No infiltrate or effusion. 

 

Nonspecific axillary lymph nodes are unchanged. 

 

The thoracic aorta is opacified and is unremarkable. The pulmonary arteries are well opacified and th
ere is no evidence of proximal pulmonary embolus. 

 

Osseous structures unremarkable. 

 

IMPRESSION: 

Adenopathy in the left lower neck and mediastinum again noted. There has been mild decrease when comp
ared to prior exam of 09/04/19 as noted above. 

 

 

CT ABDOMEN AND PELVIS:

Liver again shows a few scattered tiny hypodensities as noted before. These are unchanged and probabl
y represent tiny hepatic cystic lesions. The liver, spleen, and pancreas are otherwise unremarkable a
nd unchanged. There is evidence of mural thickening of the distal esophagus at the EG junction with s
mall sliding diaphragmatic hernia. Prominent wall thickening in the upper stomach is also seen. These
 findings appear stable from prior study. 

 

Kidneys unremarkable with small cystic lesions which appear stable. 

 

Mild nonspecific small bowel distention. 

 

There is mural thickening of the right colon, especially prominent at the cecum. This was present on 
the prior exam, slightly more prominent today. Prominent stool throughout the colon is noted. 

 

The upper abdominal adenopathy is again noted. The portocaval lymph node noted previously, which was 
measured at 1.2 cm on prior exam, is slightly smaller today with AP dimension today measured at 0.6 c
m. Paraaortic lymph nodes remain and do not appear significantly changed. A left paraaortic lymph nod
e on image 67 measures 1.5 cm and is unchanged. Other aortocaval nodes and paraaortic nodes again see
n today. 

 

The enlarged lymph nodes described previously in the right upper quadrant mesentery are again seen, b
ut appear less pronounced today. 

 

Images through the pelvis show a small amount of free fluid, similar to the prior exam. The urinary b
ladder is mildly distended and unremarkable. 

 

Osseous structures unremarkable. 

 

IMPRESSION: 

1.  Adenopathy in the upper abdomen again seen. There has been mild decrease in the size and number o
f these lymph nodes when compared to the prior study. 

 

2.  There is mural thickening involving the distal esophagus with a small sliding diaphragmatic herni
a and there is mural thickening of the stomach, especially in the region of the cardia. Consider endo
scopy. 

 

3.  Prominent mural thickening involving the right colon at the cecum is again seen and there continu
es to be nonspecific mesenteric adenopathy in this region. 

 

 

 

POS: PRETTY

## 2019-12-23 ENCOUNTER — HOSPITAL ENCOUNTER (OUTPATIENT)
Dept: HOSPITAL 92 - ONC/OP | Age: 54
Discharge: HOME | End: 2019-12-23
Attending: INTERNAL MEDICINE
Payer: MEDICAID

## 2019-12-23 VITALS — DIASTOLIC BLOOD PRESSURE: 83 MMHG | SYSTOLIC BLOOD PRESSURE: 177 MMHG | TEMPERATURE: 98.7 F

## 2019-12-23 DIAGNOSIS — C77.0: ICD-10-CM

## 2019-12-23 DIAGNOSIS — Z51.11: Primary | ICD-10-CM

## 2019-12-23 DIAGNOSIS — C18.8: ICD-10-CM

## 2019-12-23 PROCEDURE — 96417 CHEMO IV INFUS EACH ADDL SEQ: CPT

## 2019-12-23 PROCEDURE — 96413 CHEMO IV INFUSION 1 HR: CPT

## 2019-12-23 PROCEDURE — 96375 TX/PRO/DX INJ NEW DRUG ADDON: CPT

## 2019-12-23 PROCEDURE — 96367 TX/PROPH/DG ADDL SEQ IV INF: CPT

## 2019-12-23 PROCEDURE — 96415 CHEMO IV INFUSION ADDL HR: CPT

## 2020-01-06 ENCOUNTER — HOSPITAL ENCOUNTER (OUTPATIENT)
Dept: HOSPITAL 92 - ONC/OP | Age: 55
Discharge: HOME | End: 2020-01-06
Attending: INTERNAL MEDICINE
Payer: COMMERCIAL

## 2020-01-06 VITALS — SYSTOLIC BLOOD PRESSURE: 167 MMHG | TEMPERATURE: 98.1 F | DIASTOLIC BLOOD PRESSURE: 98 MMHG

## 2020-01-06 DIAGNOSIS — C77.0: ICD-10-CM

## 2020-01-06 DIAGNOSIS — C18.8: ICD-10-CM

## 2020-01-06 DIAGNOSIS — Z51.11: Primary | ICD-10-CM

## 2020-01-06 PROCEDURE — 96417 CHEMO IV INFUS EACH ADDL SEQ: CPT

## 2020-01-06 PROCEDURE — 96375 TX/PRO/DX INJ NEW DRUG ADDON: CPT

## 2020-01-06 PROCEDURE — 96415 CHEMO IV INFUSION ADDL HR: CPT

## 2020-01-06 PROCEDURE — 96413 CHEMO IV INFUSION 1 HR: CPT

## 2020-01-06 PROCEDURE — 96367 TX/PROPH/DG ADDL SEQ IV INF: CPT

## 2020-01-20 ENCOUNTER — HOSPITAL ENCOUNTER (OUTPATIENT)
Dept: HOSPITAL 92 - ONC/OP | Age: 55
Discharge: HOME | End: 2020-01-20
Attending: INTERNAL MEDICINE
Payer: COMMERCIAL

## 2020-01-20 VITALS — DIASTOLIC BLOOD PRESSURE: 87 MMHG | SYSTOLIC BLOOD PRESSURE: 161 MMHG | TEMPERATURE: 97.9 F

## 2020-01-20 DIAGNOSIS — Z51.11: Primary | ICD-10-CM

## 2020-01-20 DIAGNOSIS — C77.0: ICD-10-CM

## 2020-01-20 DIAGNOSIS — C18.8: ICD-10-CM

## 2020-01-20 PROCEDURE — 96367 TX/PROPH/DG ADDL SEQ IV INF: CPT

## 2020-01-20 PROCEDURE — 96413 CHEMO IV INFUSION 1 HR: CPT

## 2020-01-20 PROCEDURE — 96415 CHEMO IV INFUSION ADDL HR: CPT

## 2020-01-20 PROCEDURE — 96417 CHEMO IV INFUS EACH ADDL SEQ: CPT

## 2020-01-20 PROCEDURE — 96375 TX/PRO/DX INJ NEW DRUG ADDON: CPT

## 2020-02-03 ENCOUNTER — HOSPITAL ENCOUNTER (OUTPATIENT)
Dept: HOSPITAL 92 - ONC/OP | Age: 55
Discharge: HOME | End: 2020-02-03
Attending: INTERNAL MEDICINE
Payer: COMMERCIAL

## 2020-02-03 VITALS — TEMPERATURE: 98.7 F | DIASTOLIC BLOOD PRESSURE: 92 MMHG | SYSTOLIC BLOOD PRESSURE: 159 MMHG

## 2020-02-03 DIAGNOSIS — C18.8: ICD-10-CM

## 2020-02-03 DIAGNOSIS — Z51.11: Primary | ICD-10-CM

## 2020-02-03 DIAGNOSIS — C77.0: ICD-10-CM

## 2020-02-03 PROCEDURE — 82248 BILIRUBIN DIRECT: CPT

## 2020-02-03 PROCEDURE — 82378 CARCINOEMBRYONIC ANTIGEN: CPT

## 2020-02-03 PROCEDURE — 84100 ASSAY OF PHOSPHORUS: CPT

## 2020-02-03 PROCEDURE — 83615 LACTATE (LD) (LDH) ENZYME: CPT

## 2020-02-03 PROCEDURE — 84550 ASSAY OF BLOOD/URIC ACID: CPT

## 2020-02-03 PROCEDURE — 80053 COMPREHEN METABOLIC PANEL: CPT

## 2020-02-03 PROCEDURE — 96413 CHEMO IV INFUSION 1 HR: CPT

## 2020-02-03 PROCEDURE — 96417 CHEMO IV INFUS EACH ADDL SEQ: CPT

## 2020-02-03 PROCEDURE — 36415 COLL VENOUS BLD VENIPUNCTURE: CPT

## 2020-02-03 PROCEDURE — 96415 CHEMO IV INFUSION ADDL HR: CPT

## 2020-02-03 PROCEDURE — 96375 TX/PRO/DX INJ NEW DRUG ADDON: CPT

## 2020-02-17 ENCOUNTER — HOSPITAL ENCOUNTER (OUTPATIENT)
Dept: HOSPITAL 92 - ONC/OP | Age: 55
Discharge: HOME | End: 2020-02-17
Attending: INTERNAL MEDICINE
Payer: COMMERCIAL

## 2020-02-17 VITALS — SYSTOLIC BLOOD PRESSURE: 160 MMHG | DIASTOLIC BLOOD PRESSURE: 100 MMHG | TEMPERATURE: 97.8 F

## 2020-02-17 DIAGNOSIS — Z51.11: Primary | ICD-10-CM

## 2020-02-17 DIAGNOSIS — C77.0: ICD-10-CM

## 2020-02-17 DIAGNOSIS — C18.8: ICD-10-CM

## 2020-02-17 PROCEDURE — 96367 TX/PROPH/DG ADDL SEQ IV INF: CPT

## 2020-02-17 PROCEDURE — 96417 CHEMO IV INFUS EACH ADDL SEQ: CPT

## 2020-02-17 PROCEDURE — 96413 CHEMO IV INFUSION 1 HR: CPT

## 2020-02-17 PROCEDURE — 96415 CHEMO IV INFUSION ADDL HR: CPT

## 2020-02-17 PROCEDURE — 96416 CHEMO PROLONG INFUSE W/PUMP: CPT

## 2020-02-24 ENCOUNTER — HOSPITAL ENCOUNTER (EMERGENCY)
Dept: HOSPITAL 92 - ERS | Age: 55
Discharge: HOME | End: 2020-02-24
Payer: COMMERCIAL

## 2020-02-24 DIAGNOSIS — Z79.51: ICD-10-CM

## 2020-02-24 DIAGNOSIS — J06.9: ICD-10-CM

## 2020-02-24 DIAGNOSIS — J44.9: ICD-10-CM

## 2020-02-24 DIAGNOSIS — Z79.899: ICD-10-CM

## 2020-02-24 DIAGNOSIS — F17.210: ICD-10-CM

## 2020-02-24 DIAGNOSIS — R10.10: Primary | ICD-10-CM

## 2020-02-24 DIAGNOSIS — R19.7: ICD-10-CM

## 2020-02-24 DIAGNOSIS — F31.9: ICD-10-CM

## 2020-02-24 DIAGNOSIS — R11.2: ICD-10-CM

## 2020-02-24 LAB
ALBUMIN SERPL BCG-MCNC: 4 G/DL (ref 3.5–5)
ALP SERPL-CCNC: 90 U/L (ref 40–110)
ALT SERPL W P-5'-P-CCNC: 16 U/L (ref 8–55)
ANION GAP SERPL CALC-SCNC: 13 MMOL/L (ref 10–20)
AST SERPL-CCNC: 22 U/L (ref 5–34)
BASOPHILS # BLD AUTO: 0 THOU/UL (ref 0–0.2)
BASOPHILS NFR BLD AUTO: 1.1 % (ref 0–1)
BILIRUB SERPL-MCNC: 0.4 MG/DL (ref 0.2–1.2)
BUN SERPL-MCNC: 6 MG/DL (ref 9.8–20.1)
CALCIUM SERPL-MCNC: 9.2 MG/DL (ref 7.8–10.44)
CHLORIDE SERPL-SCNC: 106 MMOL/L (ref 98–107)
CK SERPL-CCNC: 57 U/L (ref 29–168)
CO2 SERPL-SCNC: 24 MMOL/L (ref 22–29)
CREAT CL PREDICTED SERPL C-G-VRATE: 0 ML/MIN (ref 70–130)
EOSINOPHIL # BLD AUTO: 0.1 THOU/UL (ref 0–0.7)
EOSINOPHIL NFR BLD AUTO: 1.6 % (ref 0–10)
GLOBULIN SER CALC-MCNC: 3 G/DL (ref 2.4–3.5)
GLUCOSE SERPL-MCNC: 150 MG/DL (ref 70–105)
HGB BLD-MCNC: 13.7 G/DL (ref 12–16)
LYMPHOCYTES # BLD: 1.5 THOU/UL (ref 1.2–3.4)
LYMPHOCYTES NFR BLD AUTO: 34.2 % (ref 21–51)
MCH RBC QN AUTO: 34.7 PG (ref 27–31)
MCV RBC AUTO: 97 FL (ref 78–98)
MONOCYTES # BLD AUTO: 0.6 THOU/UL (ref 0.11–0.59)
MONOCYTES NFR BLD AUTO: 14.5 % (ref 0–10)
NEUTROPHILS # BLD AUTO: 2.1 THOU/UL (ref 1.4–6.5)
NEUTROPHILS NFR BLD AUTO: 48.7 % (ref 42–75)
PLATELET # BLD AUTO: 262 THOU/UL (ref 130–400)
POTASSIUM SERPL-SCNC: 4 MMOL/L (ref 3.5–5.1)
RBC # BLD AUTO: 3.96 MILL/UL (ref 4.2–5.4)
SODIUM SERPL-SCNC: 139 MMOL/L (ref 136–145)
WBC # BLD AUTO: 4.3 THOU/UL (ref 4.8–10.8)

## 2020-02-24 PROCEDURE — 96374 THER/PROPH/DIAG INJ IV PUSH: CPT

## 2020-02-24 PROCEDURE — 93005 ELECTROCARDIOGRAM TRACING: CPT

## 2020-02-24 PROCEDURE — 71045 X-RAY EXAM CHEST 1 VIEW: CPT

## 2020-02-24 PROCEDURE — 74177 CT ABD & PELVIS W/CONTRAST: CPT

## 2020-02-24 PROCEDURE — 96375 TX/PRO/DX INJ NEW DRUG ADDON: CPT

## 2020-02-24 PROCEDURE — 80053 COMPREHEN METABOLIC PANEL: CPT

## 2020-02-24 PROCEDURE — 82550 ASSAY OF CK (CPK): CPT

## 2020-02-24 PROCEDURE — 84484 ASSAY OF TROPONIN QUANT: CPT

## 2020-02-24 PROCEDURE — 83690 ASSAY OF LIPASE: CPT

## 2020-02-24 PROCEDURE — 94760 N-INVAS EAR/PLS OXIMETRY 1: CPT

## 2020-02-24 PROCEDURE — 85025 COMPLETE CBC W/AUTO DIFF WBC: CPT

## 2020-02-24 NOTE — CT
CT of abdomen and pelvis:

2/24/2020



COMPARISON: 12/20/2019



HISTORY: Nausea, vomiting, and upper abdominal pain



TECHNIQUE: Axial CT imaging at 5 mm intervals from lung bases through pubic symphysis with IV contras
t.



Coronal and sagittal reformatted imaging obtained.



FINDINGS: The visualized lung bases are unremarkable. No free intraperitoneal air.



Multiple scattered subcentimeter hypodense lesions are noted within the right and left lobe of the li
damion, too small to characterize and unchanged when compared to the 12th 22,019 exam.



The spleen, pancreas, adrenal glands, and kidneys demonstrate no acute findings. Gallbladder appears 
grossly unremarkable.



Small sliding-type hiatal hernia present.



There is trace free fluid within the pelvic cul-de-sac on the right, stable. The lack of oral contras
t media limits assessment of the bowel. The appendix appears unremarkable. No evidence for bowel

obstruction is noted.



There is a mild/moderate degree of wall thickening involving the gastric antrum and the duodenum, par
ticularly the proximal duodenum. There is adjacent fat stranding and small volume fluid adjacent to

and inferior to the duodenum, which does not appear significantly changed when compared to the 12/20/
2019 examination. There is a small sliding-type hiatal hernia.



There are multiple mildly enlarged lymph nodes in the fabiola hepatis, similar when compared to the desiree
or exam. There are multiple mildly enlarged lymph nodes within the superior aspect of the

retroperitoneum, including mildly enlarged nodes in the left para-aortic region measuring up to 1 cm 
in short axis dimension and in the aortocaval region retrocaval region measuring up to 1 cm in

short axis dimension. There are nodules within the anterior abdominal mesentery posterior to the umbi
licus, measuring up to 8 mm short axis dimension, which may signify metastatic deposits or enlarged

nodes. This includes a soft tissue nodule within the of the right upper quadrant on axial image 32 me
asuring 9 mm in short axis dimension.



The vascular structures of the abdomen/pelvis appear patent. No worrisome lytic or blastic bone lesio
n.



IMPRESSION: Wall thickening of the distal stomach and the proximal duodenum with adjacent fluid. This
 may be on the basis of an inflammatory/infectious process. Clinical correlation is essential.

There are nodules within the mesentery within the anterior mid abdomen and right upper quadrant, whic
h may be reactive or metastatic in nature. In addition, fabiola hepatis and retroperitoneal

adenopathy is again noted, grossly unchanged, potentially on the basis of metastatic disease. 



Reported By: Antoine Abebe 

Electronically Signed:  2/24/2020 7:06 PM

## 2020-03-09 ENCOUNTER — HOSPITAL ENCOUNTER (OUTPATIENT)
Dept: HOSPITAL 92 - ONC/OP | Age: 55
Discharge: HOME | End: 2020-03-09
Attending: INTERNAL MEDICINE
Payer: COMMERCIAL

## 2020-03-09 VITALS — DIASTOLIC BLOOD PRESSURE: 76 MMHG | TEMPERATURE: 98 F | SYSTOLIC BLOOD PRESSURE: 137 MMHG

## 2020-03-09 DIAGNOSIS — C18.8: ICD-10-CM

## 2020-03-09 DIAGNOSIS — C77.0: ICD-10-CM

## 2020-03-09 DIAGNOSIS — Z51.11: Primary | ICD-10-CM

## 2020-03-09 PROCEDURE — 96415 CHEMO IV INFUSION ADDL HR: CPT

## 2020-03-09 PROCEDURE — 96413 CHEMO IV INFUSION 1 HR: CPT

## 2020-03-09 PROCEDURE — 96367 TX/PROPH/DG ADDL SEQ IV INF: CPT

## 2020-03-09 PROCEDURE — 96417 CHEMO IV INFUS EACH ADDL SEQ: CPT

## 2020-03-09 PROCEDURE — 96375 TX/PRO/DX INJ NEW DRUG ADDON: CPT

## 2020-03-23 ENCOUNTER — HOSPITAL ENCOUNTER (OUTPATIENT)
Dept: HOSPITAL 92 - ONC/OP | Age: 55
Discharge: HOME | End: 2020-03-23
Attending: INTERNAL MEDICINE
Payer: COMMERCIAL

## 2020-03-23 VITALS — TEMPERATURE: 97.8 F

## 2020-03-23 DIAGNOSIS — C77.0: ICD-10-CM

## 2020-03-23 DIAGNOSIS — C18.8: ICD-10-CM

## 2020-03-23 DIAGNOSIS — Z51.11: Primary | ICD-10-CM

## 2020-03-23 PROCEDURE — 96416 CHEMO PROLONG INFUSE W/PUMP: CPT

## 2020-03-23 PROCEDURE — 96375 TX/PRO/DX INJ NEW DRUG ADDON: CPT

## 2020-03-23 PROCEDURE — 96417 CHEMO IV INFUS EACH ADDL SEQ: CPT

## 2020-03-23 PROCEDURE — 96367 TX/PROPH/DG ADDL SEQ IV INF: CPT

## 2020-03-23 PROCEDURE — 96415 CHEMO IV INFUSION ADDL HR: CPT

## 2020-03-23 PROCEDURE — 96413 CHEMO IV INFUSION 1 HR: CPT

## 2020-04-06 ENCOUNTER — HOSPITAL ENCOUNTER (OUTPATIENT)
Dept: HOSPITAL 92 - ONC/OP | Age: 55
Discharge: HOME | End: 2020-04-06
Attending: INTERNAL MEDICINE
Payer: COMMERCIAL

## 2020-04-06 VITALS — DIASTOLIC BLOOD PRESSURE: 89 MMHG | SYSTOLIC BLOOD PRESSURE: 174 MMHG | TEMPERATURE: 98.8 F

## 2020-04-06 DIAGNOSIS — C18.8: ICD-10-CM

## 2020-04-06 DIAGNOSIS — C77.0: ICD-10-CM

## 2020-04-06 DIAGNOSIS — Z51.11: Primary | ICD-10-CM

## 2020-04-06 PROCEDURE — 96375 TX/PRO/DX INJ NEW DRUG ADDON: CPT

## 2020-04-06 PROCEDURE — 96415 CHEMO IV INFUSION ADDL HR: CPT

## 2020-04-06 PROCEDURE — 96413 CHEMO IV INFUSION 1 HR: CPT

## 2020-04-06 PROCEDURE — 96417 CHEMO IV INFUS EACH ADDL SEQ: CPT

## 2020-04-06 PROCEDURE — 80053 COMPREHEN METABOLIC PANEL: CPT

## 2020-04-06 PROCEDURE — 84550 ASSAY OF BLOOD/URIC ACID: CPT

## 2020-04-06 PROCEDURE — 36415 COLL VENOUS BLD VENIPUNCTURE: CPT

## 2020-04-06 PROCEDURE — 82378 CARCINOEMBRYONIC ANTIGEN: CPT

## 2020-04-06 PROCEDURE — 96366 THER/PROPH/DIAG IV INF ADDON: CPT

## 2020-04-06 PROCEDURE — 84100 ASSAY OF PHOSPHORUS: CPT

## 2020-04-06 PROCEDURE — 82248 BILIRUBIN DIRECT: CPT

## 2020-04-06 PROCEDURE — 83615 LACTATE (LD) (LDH) ENZYME: CPT

## 2020-04-23 ENCOUNTER — HOSPITAL ENCOUNTER (OUTPATIENT)
Dept: HOSPITAL 92 - BICCT | Age: 55
Discharge: HOME | End: 2020-04-23
Attending: INTERNAL MEDICINE
Payer: COMMERCIAL

## 2020-04-23 DIAGNOSIS — R59.0: ICD-10-CM

## 2020-04-23 DIAGNOSIS — C18.8: Primary | ICD-10-CM

## 2020-04-23 DIAGNOSIS — C77.0: ICD-10-CM

## 2020-04-23 PROCEDURE — 74177 CT ABD & PELVIS W/CONTRAST: CPT

## 2020-04-23 PROCEDURE — 71260 CT THORAX DX C+: CPT

## 2020-04-23 NOTE — CT
Exam: 

Chest CT with contrast

Abdomen CT with contrast

Pelvic CT with contrast



HISTORY: Metastatic colon cancer.



Correlation: None



COMPARISON: 2/24/2020, 12/20/2019



FINDINGS:

Chest CT: 



Mediastinum: No mediastinal mass, lymphadenopathy or hematoma. Stable upper normal right paratracheal
 lymph node measuring 0.7 x 1.0 cm. No significant hilar lymphadenopathy.

Aorta: Normal caliber. No aneurysm, dissection or periaortic fat stranding.

Heart: Normal heart size. No cardiomegaly. No significant pericardial fluid.

Trachea and central bronchi: Patent.

Pleural spaces: No pleural effusion.

Right lung: No mass, consolidation or suspicious nodules. Minimal emphysematous change in the lung ap
ex.

Left lung:No mass, consolidation or suspicious nodules. Minimal emphysematous change in the lung apex
.

Pneumothorax: None.

Lower neck and axilla: No lymphadenopathy.



Abdomen CT:

Gallbladder: Unremarkable.

Portal vein: Patent.

Liver: Stable subcentimeter hypodensities in the left and right hepatic lobe. No enhancing masses wit
hin the liver..

Spleen: Appropriate enhancement.

Pancreas: Appropriate enhancement.

Adrenal glands: Appropriate enhancement.

Lymphadenopathy: No gastrohepatic lymphadenopathy. There appears to be enlarged retrocrural lymph nod
e measuring 1.0 x 1.1 cm. Enlarged left periaortic lymph node measures 1.1 x 1.1 cm. Additional

enlarged left periaortic lymph node measures 1.3 x 0.7 cm. Previously, this lymph node measured 1.4 x
 0.9 cm. There is a necrotic lymph node in the right upper quadrant measuring 1.2 x 1.1 cm. There

is adjacent enhancing lymph node measuring 1.0 x 0.8 cm. The necrotic lymph node previously measured 
1.2 x 1.0 cm and the enhancing lymph node measured 0.9 x 1.1 cm. There is stable stranding of the

mesentery, at the root of the celiac artery and superior mesenteric artery.

Kidneys: Symmetric enhancement. Stable hypodensities in the left and right renal cortex. Bilaterally 
no obstructive uropathy.

Mesentery: No mass, free air, or free fluid.

Alimentary canal: Gastric mucosa has a normal appearance. Duodenum and multiple normal caliber small 
bowel loops are identified. Normal caliber appendix. Ileocecal junction has a normal appearance.

Scattered fecal material in a nondistended, nondilated colon. There does appear to be narrowing of th
e proximal sigmoid colon which may represent contraction or peristalsis. Correlate clinically. No

adjacent inflammatory changes in the mesentery.



Pelvis CT: 

Uterus is surgically absent. Urinary bladder has a normal appearance. No mucosal abnormality. No pelv
ic mass, lymphadenopathy or significant free fluid. Trace amount of fluid in the left hemipelvis

may be present. Presacral fat is preserved.



Osseous structures:No lytic or blastic lesions in the osseous structures.



IMPRESSION:



1. Findings suggesting partial response to therapy. There is still evidence of intra-abdominal lympha
denopathy as described above.

2. Stranding of the root of the abdominal mesentery may represent posttreatment change.

3. Presence of an upper normal right paratracheal lymph node. No suspicious masses or nodules in the 
lung parenchyma.



Transcribed Date/Time: 4/23/2020 11:56 AM



Reported By: Shon Pinedo 

Electronically Signed:  4/23/2020 1:59 PM

## 2020-07-06 ENCOUNTER — HOSPITAL ENCOUNTER (OUTPATIENT)
Dept: HOSPITAL 92 - BICCT | Age: 55
Discharge: HOME | End: 2020-07-06
Attending: INTERNAL MEDICINE
Payer: COMMERCIAL

## 2020-07-06 DIAGNOSIS — R59.0: ICD-10-CM

## 2020-07-06 DIAGNOSIS — C18.8: Primary | ICD-10-CM

## 2020-07-06 DIAGNOSIS — K76.9: ICD-10-CM

## 2020-07-06 DIAGNOSIS — C77.0: ICD-10-CM

## 2020-07-06 PROCEDURE — 71260 CT THORAX DX C+: CPT

## 2020-07-06 PROCEDURE — 74177 CT ABD & PELVIS W/CONTRAST: CPT

## 2020-10-12 ENCOUNTER — HOSPITAL ENCOUNTER (OUTPATIENT)
Dept: HOSPITAL 92 - BICCT | Age: 55
Discharge: HOME | End: 2020-10-12
Attending: INTERNAL MEDICINE
Payer: COMMERCIAL

## 2020-10-12 DIAGNOSIS — C18.8: Primary | ICD-10-CM

## 2020-10-12 DIAGNOSIS — C77.0: ICD-10-CM

## 2020-10-12 DIAGNOSIS — K83.8: ICD-10-CM

## 2020-10-12 PROCEDURE — 74177 CT ABD & PELVIS W/CONTRAST: CPT

## 2020-10-12 PROCEDURE — 71260 CT THORAX DX C+: CPT

## 2020-10-12 NOTE — CT
CT Chest Abd Pelvis W Con



History: Malignant. Colon cancer and metastatic disease to lymph nodes of the head/face/neck..



Comparison: CT abdomen and pelvis August 8, 2020. CT chest abdomen and pelvis July 6, 2020



Findings: Centrilobular and paraseptal emphysema is similar in the lung apices. Linear atelectasis ri
ght lung base. No suspicious pulmonary nodule. No pneumothorax. No consolidation.



The mediastinum is without adenopathy. Port catheter tip in good position. No pericardial effusion.



Mild intrahepatic biliary dilatation with abnormal enhancement thickening of the common bile duct sug
gesting chronic cholangitis. No hepatic mass.



Circumferential wall thickening and submucosal edema as well as stranding along the retroperitoneum i
nvolving the duodenum is similar, likely posttreatment fibrosis. There is mild wall thickening of

the mesenteric vessels which could be old posttreatment related.



Mild spacing of fluid along the right paracolic gutter, similar.



Large volume stool burden within the rectum. No dilated loops of large or small bowel.



Scattered wall thickening of the ascending and transverse colon.



The right gastrocolic lymph node measures 7 mm short axis, previously 11 mm. Omental lymph node axial
 image 67 measures 5 mm in short axis, previously 7 mm. No new adenopathy. No new retroperitoneal

periaortic lymph nodes.



No abnormal osteolytic or osteoblastic lesions.





Impression: 

1. Partial response to therapy with size decreased metastatic lymph nodes. No new foci of metastasis.


2. Mild progressive intrahepatic biliary dilatation with abnormal enhancement of the common bile duct
 at the hilum. Findings are most likely sequelae of posttraumatic cholangitis in the common bile

duct enhancement has slightly decreased from the comparison exam and August 2020. Close attention on 
follow-up imaging recommended.



Reported By: Juan Melgoza 

Electronically Signed:  10/12/2020 10:00 AM

## 2020-11-10 ENCOUNTER — HOSPITAL ENCOUNTER (INPATIENT)
Dept: HOSPITAL 92 - ERS | Age: 55
LOS: 5 days | Discharge: HOME | DRG: 375 | End: 2020-11-15
Attending: HOSPITALIST | Admitting: STUDENT IN AN ORGANIZED HEALTH CARE EDUCATION/TRAINING PROGRAM
Payer: COMMERCIAL

## 2020-11-10 VITALS — BODY MASS INDEX: 15.1 KG/M2

## 2020-11-10 DIAGNOSIS — F17.200: ICD-10-CM

## 2020-11-10 DIAGNOSIS — Z90.710: ICD-10-CM

## 2020-11-10 DIAGNOSIS — Z92.21: ICD-10-CM

## 2020-11-10 DIAGNOSIS — F31.9: ICD-10-CM

## 2020-11-10 DIAGNOSIS — C18.8: Primary | ICD-10-CM

## 2020-11-10 DIAGNOSIS — Z79.891: ICD-10-CM

## 2020-11-10 DIAGNOSIS — F20.9: ICD-10-CM

## 2020-11-10 DIAGNOSIS — Z79.899: ICD-10-CM

## 2020-11-10 DIAGNOSIS — K75.9: ICD-10-CM

## 2020-11-10 DIAGNOSIS — Z85.038: ICD-10-CM

## 2020-11-10 DIAGNOSIS — I10: ICD-10-CM

## 2020-11-10 DIAGNOSIS — Z20.828: ICD-10-CM

## 2020-11-10 DIAGNOSIS — K81.0: ICD-10-CM

## 2020-11-10 DIAGNOSIS — J44.9: ICD-10-CM

## 2020-11-10 LAB
ALBUMIN SERPL BCG-MCNC: 3.5 G/DL (ref 3.5–5)
ALP SERPL-CCNC: 490 U/L (ref 40–110)
ALT SERPL W P-5'-P-CCNC: 39 U/L (ref 8–55)
ANION GAP SERPL CALC-SCNC: 15 MMOL/L (ref 10–20)
AST SERPL-CCNC: 63 U/L (ref 5–34)
BASOPHILS # BLD AUTO: 0 THOU/UL (ref 0–0.2)
BASOPHILS NFR BLD AUTO: 0.4 % (ref 0–1)
BILIRUB SERPL-MCNC: 0.5 MG/DL (ref 0.2–1.2)
BUN SERPL-MCNC: 4 MG/DL (ref 9.8–20.1)
CALCIUM SERPL-MCNC: 9.1 MG/DL (ref 7.8–10.44)
CHLORIDE SERPL-SCNC: 103 MMOL/L (ref 98–107)
CO2 SERPL-SCNC: 22 MMOL/L (ref 22–29)
CREAT CL PREDICTED SERPL C-G-VRATE: 0 ML/MIN (ref 70–130)
EOSINOPHIL # BLD AUTO: 1.4 THOU/UL (ref 0–0.7)
EOSINOPHIL NFR BLD AUTO: 20.4 % (ref 0–10)
GLOBULIN SER CALC-MCNC: 4 G/DL (ref 2.4–3.5)
GLUCOSE SERPL-MCNC: 93 MG/DL (ref 70–105)
HGB BLD-MCNC: 16 G/DL (ref 12–16)
LYMPHOCYTES # BLD: 1 THOU/UL (ref 1.2–3.4)
LYMPHOCYTES NFR BLD AUTO: 14.3 % (ref 21–51)
MCH RBC QN AUTO: 32.5 PG (ref 27–31)
MCV RBC AUTO: 95.4 FL (ref 78–98)
MONOCYTES # BLD AUTO: 0.6 THOU/UL (ref 0.11–0.59)
MONOCYTES NFR BLD AUTO: 9.3 % (ref 0–10)
NEUTROPHILS # BLD AUTO: 3.8 THOU/UL (ref 1.4–6.5)
NEUTROPHILS NFR BLD AUTO: 55.5 % (ref 42–75)
PLATELET # BLD AUTO: 411 THOU/UL (ref 130–400)
POTASSIUM SERPL-SCNC: 3.6 MMOL/L (ref 3.5–5.1)
PROT UR STRIP.AUTO-MCNC: 10 MG/DL
RBC # BLD AUTO: 4.93 MILL/UL (ref 4.2–5.4)
SODIUM SERPL-SCNC: 136 MMOL/L (ref 136–145)
SP GR UR STRIP: 1.01 (ref 1–1.04)
TROPONIN I SERPL DL<=0.01 NG/ML-MCNC: (no result) NG/ML (ref ?–0.03)
TROPONIN I SERPL DL<=0.01 NG/ML-MCNC: (no result) NG/ML (ref ?–0.03)
WBC # BLD AUTO: 6.9 THOU/UL (ref 4.8–10.8)

## 2020-11-10 PROCEDURE — 93005 ELECTROCARDIOGRAM TRACING: CPT

## 2020-11-10 PROCEDURE — 71045 X-RAY EXAM CHEST 1 VIEW: CPT

## 2020-11-10 PROCEDURE — 70450 CT HEAD/BRAIN W/O DYE: CPT

## 2020-11-10 PROCEDURE — 78227 HEPATOBIL SYST IMAGE W/DRUG: CPT

## 2020-11-10 PROCEDURE — 84075 ASSAY ALKALINE PHOSPHATASE: CPT

## 2020-11-10 PROCEDURE — 80048 BASIC METABOLIC PNL TOTAL CA: CPT

## 2020-11-10 PROCEDURE — 87635 SARS-COV-2 COVID-19 AMP PRB: CPT

## 2020-11-10 PROCEDURE — 81003 URINALYSIS AUTO W/O SCOPE: CPT

## 2020-11-10 PROCEDURE — 83605 ASSAY OF LACTIC ACID: CPT

## 2020-11-10 PROCEDURE — 85025 COMPLETE CBC W/AUTO DIFF WBC: CPT

## 2020-11-10 PROCEDURE — 80053 COMPREHEN METABOLIC PANEL: CPT

## 2020-11-10 PROCEDURE — 74177 CT ABD & PELVIS W/CONTRAST: CPT

## 2020-11-10 PROCEDURE — G0378 HOSPITAL OBSERVATION PER HR: HCPCS

## 2020-11-10 PROCEDURE — 36415 COLL VENOUS BLD VENIPUNCTURE: CPT

## 2020-11-10 PROCEDURE — 76705 ECHO EXAM OF ABDOMEN: CPT

## 2020-11-10 PROCEDURE — A9537 TC99M MEBROFENIN: HCPCS

## 2020-11-10 PROCEDURE — 84484 ASSAY OF TROPONIN QUANT: CPT

## 2020-11-10 PROCEDURE — 82977 ASSAY OF GGT: CPT

## 2020-11-10 PROCEDURE — U0003 INFECTIOUS AGENT DETECTION BY NUCLEIC ACID (DNA OR RNA); SEVERE ACUTE RESPIRATORY SYNDROME CORONAVIRUS 2 (SARS-COV-2) (CORONAVIRUS DISEASE [COVID-19]), AMPLIFIED PROBE TECHNIQUE, MAKING USE OF HIGH THROUGHPUT TECHNOLOGIES AS DESCRIBED BY CMS-2020-01-R: HCPCS

## 2020-11-10 PROCEDURE — 94760 N-INVAS EAR/PLS OXIMETRY 1: CPT

## 2020-11-10 NOTE — PDOC.HHP
Hospitalist HPI





- History of Present Illness


Generalized weakness, chest pain


History of Present Illness: 





This is a 55-year-old female patient with a history of colon cancer on 

chemotherapy, asthma, who presents today with generalized bodily pains and 

weakness with chest pain over the past couple of days.


She describes the pains are nonspecific and generalized involving the whole 

body.  She denies any associated nausea anorexia or vomiting.


This is her third remission for colon cancer and last treatment was about 6 

months ago.





At presentation her vitals showed a BP of 176/100, pulse 77, temperature 97.8, 

respiratory rate 18 and saturation 99 on room air.


CMP showed elevated alkaline phosphatase at 490 from a baseline of 74 3 months 

ago.  She also had increased AST at 63 from a level of 15 4 months ago.  CBC was

otherwise generally unremarkable.  Chest x-ray revealed no acute intrapulmonary 

process.  CT head was also done which was negative.


She received a dose of 3 to 4 mg aspirin.


Hospitalist team consulted for admission.








Hospitalist ROS





- Review of Systems


Constitutional: denies: fever, chills, sweats


Respiratory: denies: cough, shortness of breath, hemoptysis


Cardiovascular: reports: chest pain.  denies: palpitations, orthopnea


Gastrointestinal: denies: nausea, vomiting, abdominal pain


Genitourinary: denies: dysuria, frequency, incontinence


Neurological: denies: weakness, numbness, incoordination





Hospitalist History





- Past Medical History


Other Medical History: 





Colon cancer, asthma





- Past Surgical History


Past Surgical History: reports: no pertinent history





- Family History


Family History: reports: no pertinent history





- Social History


Smoking Status: Current every day smoker


Alcohol: reports: None


Drugs: reports: marijuana


Living Situation: With Family





- Exam


General Appearance: awake alert


ENT: normocephalic atraumatic, no oropharyngeal lesions


Heart: RRR, no murmur, no gallops, normal peripheral pulses


Respiratory: no wheezes, no rales, no ronchi, no tachypnea


Gastrointestinal: soft, non-tender, non-distended, normal bowel sounds


Extremities: no cyanosis, no clubbing, no edema


Neurological: cranial nerve grossly intact, no weakness


Psychiatric: normal affect, normal behavior, A&O x 3





Hospitalist Results





- Labs


Result Diagrams: 


                                 11/10/20 13:12





                                 11/10/20 13:53


Lab results: 


                                        











WBC  6.9 thou/uL (4.8-10.8)   11/10/20  13:12    


 


Hgb  16.0 g/dL (12.0-16.0)   11/10/20  13:12    


 


Hct  47.0 % (36.0-47.0)   11/10/20  13:12    


 


MCV  95.4 fL (78.0-98.0)   11/10/20  13:12    


 


Plt Count  411 thou/uL (130-400)  H  11/10/20  13:12    


 


Neutrophils %  55.5 % (42.0-75.0)   11/10/20  13:12    


 


Sodium  136 mmol/L (136-145)   11/10/20  13:53    


 


Potassium  3.6 mmol/L (3.5-5.1)   11/10/20  13:53    


 


Chloride  103 mmol/L ()   11/10/20  13:53    


 


Carbon Dioxide  22 mmol/L (22-29)   11/10/20  13:53    


 


BUN  4 mg/dL (9.8-20.1)  L  11/10/20  13:53    


 


Creatinine  0.53 mg/dL (0.6-1.1)  L  11/10/20  13:53    


 


Glucose  93 mg/dL ()   11/10/20  13:53    


 


Lactic Acid  0.9 mmol/L (0.5-2.2)   11/10/20  13:53    


 


Calcium  9.1 mg/dL (7.8-10.44)   11/10/20  13:53    


 


Total Bilirubin  0.5 mg/dL (0.2-1.2)   11/10/20  13:53    


 


AST  63 U/L (5-34)  H  11/10/20  13:53    


 


ALT  39 U/L (8-55)   11/10/20  13:53    


 


Alkaline Phosphatase  490 U/L ()  H  11/10/20  13:53    


 


Troponin I  Less than  0.010 ng/mL (< 0.028)   11/10/20  16:46    


 


Serum Total Protein  7.5 g/dL (6.0-8.3)   11/10/20  13:53    


 


Albumin  3.5 g/dL (3.5-5.0)   11/10/20  13:53    


 


Urine Ketones  20 mg/dL (Negative)  A  11/10/20  16:08    


 


Urine Blood  Negative  (Negative)   11/10/20  16:08    


 


Urine Nitrite  Negative  (Negative)   11/10/20  16:08    


 


Ur Leukocyte Esterase  Negative Sharon/uL (Negative)   11/10/20  16:08    














Hospitalist H&P A/P





- Plan


Plan: 





This is a 55-year-old female patient history of colon cancer on a 6-month 

remission presented with generalized body pains and weakness of 2 days duration.





Generalized weakness


Unclear etiology


Labs generally within normal limits besides elevated liver enzymes.


Possible related to cancer recurrence.


Observe overnight





Hepatitis


Enzymes look cholestatic


Significant increase from a few months ago ALT and AST


We will order right upper quadrant ultrasound


Monitor CMP





Hypertension


BP elevated not controlled


We will start amlodipine





VT prophylaxisLovenox


Code 2000full code

## 2020-11-10 NOTE — CT
CT BRAIN WITHOUT CONTRAST:

 

Date:  11/10/2020

 

HISTORY:  

High blood pressure. Right-sided facial numbness. 

 

COMPARISON:  

02/02/2017. 

 

FINDINGS:

No evidence of acute infarct, hemorrhage, midline shift, or abnormal extra-axial fluid collections ar
e seen. The ventricular size is normal and the basilar cisterns are patent. The bony calvarium is int
act. The visualized paranasal sinuses and mastoid air cells are well aerated. A small mucus retention
 cyst versus polyp is seen in the right posterior sphenoid sinus. 

 

IMPRESSION: 

No CT evidence of acute intracranial process.  

 

POS: AH

## 2020-11-10 NOTE — RAD
XR Chest 1 View Portable



History: Facial numbness



Comparison: Radiograph February 2020



Findings: Port catheter tip sits at the mid SVC. No confluent airspace consolidation, pneumothorax or
 effusion.



Abnormal right paratracheal fullness concerning for adenopathy. Mild background lung hyperinflation. 
Mild scarring right lung base.



Impression: 

1. Mildly prominent right paratracheal soft tissues may reflect adenopathy as seen on recent CT exam.


2. No acute inflammatory process within the chest.

3. No evidence for pneumonia.



Reported By: Juan Melgoza 

Electronically Signed:  11/10/2020 1:47 PM

## 2020-11-11 LAB
ANION GAP SERPL CALC-SCNC: 13 MMOL/L (ref 10–20)
BUN SERPL-MCNC: 4 MG/DL (ref 9.8–20.1)
CALCIUM SERPL-MCNC: 9.3 MG/DL (ref 7.8–10.44)
CHLORIDE SERPL-SCNC: 105 MMOL/L (ref 98–107)
CO2 SERPL-SCNC: 23 MMOL/L (ref 22–29)
CREAT CL PREDICTED SERPL C-G-VRATE: 78 ML/MIN (ref 70–130)
GLUCOSE SERPL-MCNC: 75 MG/DL (ref 70–105)
HGB BLD-MCNC: 14.5 G/DL (ref 12–16)
MCH RBC QN AUTO: 32.4 PG (ref 27–31)
MCV RBC AUTO: 97 FL (ref 78–98)
MDIFF COMPLETE?: YES
PLATELET # BLD AUTO: 424 THOU/UL (ref 130–400)
POTASSIUM SERPL-SCNC: 3.6 MMOL/L (ref 3.5–5.1)
RBC # BLD AUTO: 4.46 MILL/UL (ref 4.2–5.4)
SODIUM SERPL-SCNC: 137 MMOL/L (ref 136–145)
WBC # BLD AUTO: 5.8 THOU/UL (ref 4.8–10.8)

## 2020-11-11 RX ADMIN — Medication SCH ML: at 09:30

## 2020-11-11 RX ADMIN — Medication SCH ML: at 21:32

## 2020-11-11 NOTE — CON
DATE OF CONSULTATION:  



REASON FOR CONSULT:  Colon cancer.



HISTORY OF PRESENT ILLNESS:  Ms. Rojas is a pleasant 55-year-old female, who has

stage IV metastatic adenocarcinoma of the colon.  She completed 6 months of FOLFOX

and Avastin in April of 2020 and has been in remission since that time.  She had a

colonoscopy in October that showed no signs of obstruction, but did have residual

malignancy.  The patient has been on a chemo holiday per her request and done well.

She also had a CT scan in October that showed stable disease.  She began having low

back pain with radiation down the right leg.  She saw her primary care last week,

Dr. Young at [facility].  She states she had imaging done which was normal and

was given tramadol for pain.  She stopped taking it because it made her too sleepy.

Yesterday, her pain was intractable, so she presented to the emergency room for

evaluation.  She was also having some facial numbing and was concerned she was

having a stroke.  In the emergency room, they did a brain CT which was negative.

She had chemistry drawn which showed AST and alkaline phosphatase elevation.  She

apparently was having some right upper quadrant abdominal pain as well and had an

abdominal ultrasound which showed mild gallbladder wall thickening.  The patient

denies any nausea, vomiting, diarrhea, or constipation.  She has been treated with

pain medication and states she is feeling much better at this time. 



PAST MEDICAL HISTORY:  

1. Metastatic colon cancer.

2. Hypertension.



PAST SURGICAL HISTORY:  

1. Hysterectomy.

2. Lymph node biopsy.



ALLERGIES:  NO KNOWN DRUG ALLERGIES.



HOME MEDICATIONS:  

1. Hydrochlorothiazide.

2. Neurontin.

3. Tramadol.

4. Xanax.

5. Proventil.



FAMILY HISTORY:  Mother had bone cancer.



SOCIAL HISTORY:  Single.  No alcohol, tobacco, or illicit drug use.



REVIEW OF SYSTEMS:  A 12-point review of systems is negative except for noted in HPI.



PHYSICAL EXAMINATION:

VITAL SIGNS:  Temperature is 98.3, pulse is 75, respiratory rate 19, blood pressure

is 145/90, she is 98% on room air. 

GENERAL:  This is a well-developed, well-nourished female, in no acute distress. 

HEENT:  Normocephalic, atraumatic.  Pupils are equal and reactive to light. 

NECK:  Supple. 

CV:  Regular rate and rhythm. 

LUNGS:  Clear. 

ABDOMEN:  Soft.  Bowel sounds are positive. 

EXTREMITIES:  No clubbing or cyanosis. 

NEUROLOGICAL:  Nonfocal.



PERTINENT LABORATORY DATA AND X-RAYS:  WBCs are 5.8, hemoglobin 14.5, hematocrit

43.3, platelet count is 424,000, 50% neutrophils, 3% bands, 11% lymphocytes.  Sodium

137, potassium 3.6, chloride 105, CO2 is 23, BUN is 4, creatinine 0.52, calcium 9.3,

total bilirubin is 0.5.  AST 63, ALT is 39, alkaline phosphatase 490.  Troponin is

negative.  Serum total protein 7.5, albumin 3.5, globulin 4.  COVID PCR negative.

Radiology per HPI. 



ASSESSMENT:  

1. Low back pain.

2. Stage IV colon cancer.

3. Right upper quadrant abdominal pain.



DISCUSSION:  The patient states that her back pain has been present for several

months.  She had restaging of her colon cancer in October.  Her chest, abdomen, and

pelvic CT showed no new foci of metastasis.  On the CT in October, she did have mild

progressive intrahepatic biliary dilatation.  This was concerning for posttraumatic

cholangitis in the common bile duct.  She underwent a colonoscopy in late October

which was negative.  Her current symptoms of right upper quadrant pain that may be

related to her gallbladder.  Her low back pain may be related to just chronic

changes.  She apparently did have scanning last week and I have no access to that.

I do not think she had an MRI which we may consider.  Her CBC is stable at this

time.  We will follow along remotely with her hospitalization. 



Thank you for the consult.







Job ID:  [id number]

## 2020-11-11 NOTE — ULT
Exam:

Right upper quadrant ultrasound:



HISTORY:

Elevated liver function tests. History of colon cancer. Patient currently on chemotherapy.



COMPARISON:

CT abdomen on 10/12/2020



FINDINGS:

Liver: Mild intrahepatic biliary ductal dilatation is seen. No focal hepatic lesion is identified.



Gallbladder: Evidence of mild gallbladder wall thickening with the gallbladder wall measuring 0.4 cm 
in thickness. Tiny amount of adjacent pericholecystic fluid is identified. No gallbladder calculus

is seen.

Common bile duct: The common duct is normal in caliber  measuring 0.3 cm in diameter.



Pancreas: Limited visualized portions of the pancreas demonstrate a normal sonographic appearance.



Right kidney: Right kidney demonstrates a normal sonographic appearance.  The right kidney measures 1
0.1 cm in length.

IVC: The visualized IVC demonstrates a normal sonographic appearance.





IMPRESSION:



1. Mild gallbladder wall thickening with adjacent pericholecystic fluid. No gallbladder calculus is s
een. Ultrasonographer does note a positive sonographic Hairston's sign. Findings may be related to

cholecystitis. Hepatobiliary study may be helpful for further evaluation if indicated.

2. Mild intrahepatic biliary ductal dilatation. This was present on a CT examination on 10/12/2020. C
ommon duct is normal in caliber.



Reported By: Enzo Arechiga 

Electronically Signed:  11/11/2020 7:56 AM

## 2020-11-11 NOTE — PDOC.HOSPP
- Subjective


Encounter Date: 11/11/20


Encounter Time: 09:00


Subjective: 


Patient with low back pain into legs for past 1.5-2 months, 3 days of very 

tender abdomen on the right side. No N/V. Poor appetite.





- Objective


Vital Signs & Weight: 


                             Vital Signs (12 hours)











  Temp Pulse Resp BP Pulse Ox


 


 11/11/20 04:00  98.1 F  65  20  166/90 H  97


 


 11/10/20 23:05   88   178/85 H 


 


 11/10/20 19:50  98.9 F  85  16  191/90 H  98








                                     Weight











Weight                         90 lb














I&O: 


                                        











 11/10/20 11/11/20 11/12/20





 06:59 06:59 06:59


 


Intake Total  200 


 


Output Total  0 


 


Balance  200 











Result Diagrams: 


                                 11/11/20 04:23





                                 11/11/20 04:23





Hospitalist ROS





- Review of Systems


Constitutional: reports: weakness.  denies: fever, chills


Respiratory: denies: cough, shortness of breath


Cardiovascular: denies: chest pain, palpitations


Gastrointestinal: reports: abdominal pain.  denies: nausea, vomiting


Genitourinary: denies: dysuria, hematuria





- Medication


Medications: 


Active Medications











Generic Name Dose Route Start Last Admin





  Trade Name Freq  PRN Reason Stop Dose Admin


 


Acetaminophen  650 mg  11/10/20 17:07  11/10/20 20:44





  Acetaminophen 325 Mg Tab  PO   650 mg





  Q4H PRN   Administration





  Headache/Fever/Mild Pain (1-3)  


 


Amlodipine Besylate  10 mg  11/10/20 21:00  11/10/20 20:46





  Amlodipine 10 Mg Tab  PO   10 mg





  2100 OWEN   Administration














- Exam


General Appearance: NAD, awake alert


ENT: moist mucosa


Neck: supple, symmetric, no JVD, no thyromegaly


Heart: RRR, no murmur, no gallops, no rubs


Respiratory: CTAB, no wheezes, no rales, no ronchi


Gastrointestinal: soft, non-distended, normal bowel sounds, tender to palpation


Gastrointestinal - other findings: TTP, worst in RUQ with some guarding


Musculoskeletal - other findings: TTP across low back over lower lumbar/sacral 

area


Psychiatric: normal affect, normal behavior, A&O x 3





Hosp A/P


(1) Generalized weakness


Code(s): R53.1 - WEAKNESS   Status: Acute   





(2) Hepatitis


Status: Acute   





(3) HTN (hypertension)


Code(s): I10 - ESSENTIAL (PRIMARY) HYPERTENSION   Status: Chronic   


Qualifiers: 


   Hypertension type: essential hypertension   Qualified Code(s): I10 - 

Essential (primary) hypertension   





(4) Malignant neoplasm of overlapping sites of colon


Code(s): C18.8 - MALIGNANT NEOPLASM OF OVERLAPPING SITES OF COLON   Status: 

Chronic   





(5) COPD (chronic obstructive pulmonary disease)


Status: Chronic   


Qualifiers: 


   COPD type: chronic bronchitis 





(6) Tobacco abuse


Code(s): Z72.0 - TOBACCO USE   Status: Chronic   





- Plan





US liver with continued biliary dilitation, thickened GB with mild 

pericholecystic fluid, positive U/S Hairston's


Spoke with Dr. Tillman and she stated that patient would not be a surgical 

candidate, but to ask GI if stent would work, or if might need percutaneous 

drain


Mild liver function test elevations, possibly cancer worsening. Will consult 

oncology.

## 2020-11-12 RX ADMIN — Medication SCH ML: at 11:50

## 2020-11-12 RX ADMIN — Medication SCH ML: at 22:04

## 2020-11-12 NOTE — PRG
DATE OF SERVICE:  



REASON FOR CONSULTATION:  Right upper quadrant abdominal pain, probable

cholecystitis. 



SUBJECTIVE:  Today, the patient underwent HIDA scan imaging for evaluation of her

gallbladder with the HIDA scan positive for acute cholecystitis.  In the post

imaging period, she states that she continues to have increased right upper quadrant

abdominal pain, but is somewhat improved when compared to previous with

administration of pain medications.  Otherwise, she denies any nausea, vomiting,

fevers, chills, hematemesis, melena, or hematochezia. 



OBJECTIVE:  VITAL SIGNS:  Temperature 97.9, pulse 66, blood pressure 135/73,

respiratory rate 20, and saturating 96% on room air. 

GENERAL:  The patient is lying in bed, in no acute distress.  Alert and oriented x4.

CARDIOVASCULAR:  Regular rate and rhythm. RESPIRATORY:  Clear to auscultation

bilaterally. 

ABDOMEN:  Normoactive bowel sounds.  Soft, nondistended.  Tenderness to palpation in

the right upper quadrant and right mid abdomen. EXTREMITIES:  No cyanosis, clubbing,

or edema. 



LABORATORY DATA:  No current studies are available for review.



IMAGING DATA:  Hepatobiliary scan was performed on November 12, 2020, which showed

passage of the radiotracer from the common bile duct into the small bowel loops.

However, after 1 hour, radiotracer was not noted in the gallbladder.  Therefore, the

patient was given 2 mg of morphine intravenously.  Additional imaging was performed

1 hour afterwards and there was lack of radiotracer localized in the gallbladder

indicative of evidence of acute cholecystitis. 



ASSESSMENT AND PLAN:  The patient is a 55-year-old  female, with

past medical history of metastatic colon cancer, status post chemotherapy and

hypertension, now presenting with acute cholecystitis. 



Acute cholecystitis. 



The patient initially presented with increased right upper quadrant abdominal pain

as well as imaging consistent with gallbladder wall thickening and pericholecystic

fluid without cholelithiasis.  She did also have mildly elevated LFTs primarily with

an alkaline phosphatase predominance, which was concerning for possible hepatic

process versus biliary tree process.  However with the ultrasound showing possible

cholecystitis, she subsequently underwent a HIDA scan on November 12, 2020, which

showed no uptake of the radiotracer within the gallbladder confirming acute

cholecystitis.  At this time, the patient is a complicated surgical candidate

primarily related to her concurrent diagnosis of colon cancer, for which the patient

has previously undergone chemotherapy.  With the presence of colon adenocarcinoma

and the supraclavicular lymph nodes, it is unclear if this is affecting any

structures outside of the lymphatic system or if there is presence of carcinomatosis

related to her colon cancer (less likely). 



RECOMMENDATIONS:  

1. Would confer with General Surgery Service for evaluation of the patient and

whether or not the patient is an appropriate candidate for cholecystectomy. 

2. If the patient is not a candidate for cholecystectomy, then placement of a

cholecystostomy tube would be indicated. ERCP with stent placement would not address

the issue of the acute cholecystitis. 

3. Pain control per primary team. 

We will continue to follow.  Please call with any questions.







Job ID:  [id number]

## 2020-11-12 NOTE — PDOC.HOSPP
- Subjective


Encounter Date: 11/12/20


Encounter Time: 12:00


Subjective: 


Patient back from HIDA scan that was abnormal. States abdominal tenderness 

unchanged, when I press on her abdomen though she is very tender. Back pain 

improved and ambulating well per nursing.





- Objective


Vital Signs & Weight: 


                             Vital Signs (12 hours)











  Temp Pulse Resp BP Pulse Ox


 


 11/12/20 04:00  97.0 F L  70  20  121/77  97


 


 11/11/20 21:33   81   


 


 11/11/20 20:00  98.3 F  81  16  144/79 H  97








                                     Weight











Admit Weight                   90 lb


 


Weight                         90 lb 12.8 oz














I&O: 


                                        











 11/11/20 11/12/20 11/13/20





 06:59 06:59 06:59


 


Intake Total 200 940 


 


Output Total 0  


 


Balance 200 940 











Result Diagrams: 


                                 11/11/20 04:23





                                 11/11/20 04:23





Hospitalist ROS





- Review of Systems


Constitutional: denies: fever, chills


Respiratory: denies: cough, shortness of breath


Cardiovascular: denies: chest pain, palpitations


Gastrointestinal: reports: abdominal pain.  denies: nausea, vomiting





- Medication


Medications: 


Active Medications











Generic Name Dose Route Start Last Admin





  Trade Name Freq  PRN Reason Stop Dose Admin


 


Acetaminophen  650 mg  11/10/20 17:07  11/10/20 20:44





  Acetaminophen 325 Mg Tab  PO   650 mg





  Q4H PRN   Administration





  Headache/Fever/Mild Pain (1-3)  


 


Alendronate Sodium  70 mg  11/11/20 09:00  11/11/20 09:31





  Alendronate Sodium 70 Mg Tablet  PO   Not Given





  [facility]D [facility]  


 


Amlodipine Besylate  10 mg  11/10/20 21:00  11/11/20 21:33





  Amlodipine 10 Mg Tab  PO   10 mg





  2100 OWEN   Administration


 


Enoxaparin Sodium  40 mg  11/11/20 09:00  11/11/20 09:29





  Enoxaparin Sodium 40 Mg/0.4 Ml Syringe  SC   Not Given





  0900 OWEN  


 


Gabapentin  300 mg  11/11/20 09:00  11/11/20 21:33





  Gabapentin 300 Mg Cap  PO   300 mg





  TID OWEN   Administration


 


Hydrochlorothiazide  25 mg  11/11/20 09:00  11/11/20 09:27





  Hydrochlorothiazide 25 Mg Tab  PO   25 mg





  DAILY OWEN   Administration


 


Sodium Chloride  10 ml  11/11/20 09:00  11/11/20 21:32





  Flush - Normal Saline 10 Ml Syringe  IVF   10 ml





  Q12HR OWEN   Administration


 


Tramadol HCl  50 mg  11/11/20 10:24  11/11/20 21:32





  Tramadol Hcl 50 Mg Tab  PO   50 mg





  Q8H PRN   Administration





  Pain  














- Exam


General Appearance: NAD, awake alert


ENT: moist mucosa


Heart: RRR, no murmur, no gallops, no rubs


Respiratory: CTAB, no wheezes, no rales, no ronchi


Gastrointestinal: non-distended, normal bowel sounds, no palpable masses, tender

to palpation, voluntary guarding


Gastrointestinal - other findings: RUQ>RLQ though both very tender with guarding


Extremities: no edema


Psychiatric: normal affect, normal behavior, A&O x 3





Hosp A/P


(1) Generalized weakness


Code(s): R53.1 - WEAKNESS   Status: Acute   





(2) Hepatitis


Status: Acute   





(3) HTN (hypertension)


Code(s): I10 - ESSENTIAL (PRIMARY) HYPERTENSION   Status: Chronic   


Qualifiers: 


   Hypertension type: essential hypertension   Qualified Code(s): I10 - Ess

ential (primary) hypertension   





(4) Malignant neoplasm of overlapping sites of colon


Code(s): C18.8 - MALIGNANT NEOPLASM OF OVERLAPPING SITES OF COLON   Status: 

Chronic   





(5) COPD (chronic obstructive pulmonary disease)


Status: Chronic   


Qualifiers: 


   COPD type: chronic bronchitis 





(6) Tobacco abuse


Code(s): Z72.0 - TOBACCO USE   Status: Chronic   





(7) Abdominal pain


Code(s): R10.9 - UNSPECIFIED ABDOMINAL PAIN   Status: Acute   





- Plan





US liver with continued biliary dilitation, thickened GB with mild peric

holecystic fluid, positive U/S Hairston's


Spoke with Dr. Tillman and she stated that patient would not be a surgical 

candidate, but to ask GI if stent would work, or if might need percutaneous 

drain. Dr. Andre evaluated and recommended HIDA scan. HIDA scan abnormal. 

Awaiting recs from GI. 


Back pain improved and no evidence bony involvement with tumor on recent CT 

scans abd/pelvis. Can f/u about this with PCP.

## 2020-11-12 NOTE — CON
DATE OF CONSULTATION:  11/11/2020



CHIEF COMPLAINT:  Back pain.



HISTORY OF PRESENT ILLNESS:  Ms. Rojas is a 55-year-old woman, who has had lower

back pain for several weeks.  This pain intensified yesterday and she came to the

emergency room for further care.  She also had numbness and weakness in her left arm

and leg.  She was noted to have elevated alkaline phosphatase on ultrasound was

performed, which showed some mild gallbladder wall thickening and pericholecystic

fluid, but no stones.  Some mild intrahepatic biliary duct dilation was noted, which

was also present on CT scan from 10/12/2020.  The common duct was normal.  She has

had no nausea or vomiting.  No diarrhea or constipation.  She did have a colonoscopy

recently, which showed a large polyp, which was adenoma.  She has metastatic colon

cancer, for which she has received chemotherapy. 



PAST MEDICAL HISTORY:  Colon cancer, stage IV.  She was found to have

supraclavicular lymph node that was positive for metastatic colon cancer,

hypertension. 



PAST SURGICAL HISTORY:  Hysterectomy, lymph node biopsy.



FAMILY HISTORY:  Negative for GI malignancy.



SOCIAL HISTORY:  She smokes and use marijuana.  No alcohol.



ALLERGIES:  NO KNOWN DRUG ALLERGIES.



MEDICATIONS:  Prior to admission, 

1. Tramadol as needed.

2. Hydrochlorothiazide.

3. Alprazolam.

4. Gabapentin.

5. Alendronate.

6. Albuterol.



REVIEW OF SYSTEMS:  Negative x10 systems reviewed except as stated in history of

present illness. 



PHYSICAL EXAMINATION:

VITAL SIGNS:  Temperature 98.3, pulse 79, blood pressure 172/93. 

GENERAL:  She is in no acute distress.  Alert and oriented x3. 

HEENT:  Eyes have no scleral icterus.  Oropharynx is clear without lesions. 

NECK:  No cervical or supraclavicular lymphadenopathy. 

LUNGS:  Clear to auscultation bilaterally. 

HEART:  Regular rate and rhythm without murmur. 

ABDOMEN:  Soft, tender in the right lower quadrant, less tender in the left upper

quadrant, with mild tenderness in the epigastric region.  Her bowel sounds are

present. 

EXTREMITIES:  No lower extremity edema. 

NEUROLOGIC:  Cranial nerves are grossly intact.



LABORATORY DATA:  Bilirubin 0.5, AST 63, ALT 39, alkaline phosphatase 490, albumin

3.5, creatinine 0.53. 



IMAGING:  Abdominal ultrasound findings noted above.



IMPRESSION:  

1. Colon cancer stage IV.  She has been on chemotherapy for that.  She [facility]

[facility], which reportedly showed stable disease. 

2. Lower back pain.

3. Right upper quadrant tenderness with some ultrasound finding showing thickening

of the gallbladder and pericholecystic fluid.  The ultrasound findings were mild

overall.  By exam, she has more tenderness in the right lower quadrant than over the

gallbladder.  Still we will rule out acute cholecystitis with a HIDA scan.  If this

is negative, then consider an MRI on her back to further evaluate her back pain. 



RECOMMENDATIONS:  

1. HIDA scan tomorrow.

2. If the HIDA scan is negative, consider further imaging of her spine per the

primary service. 

3. If her HIDA scan is negative, then she can also advance her diet.

4. If the HIDA scan does show signs of acute cholecystitis, then cholecystostomy

tube 

will likely be necessary.  Primary service did speak with General Surgery and did

not feel that she would be a surgical candidate. 







Job ID:  [id number]

## 2020-11-13 RX ADMIN — Medication SCH ML: at 09:32

## 2020-11-13 RX ADMIN — CEFTRIAXONE SCH MLS: 2 INJECTION, POWDER, FOR SOLUTION INTRAMUSCULAR; INTRAVENOUS at 10:29

## 2020-11-13 RX ADMIN — Medication SCH ML: at 22:02

## 2020-11-13 NOTE — PRG
DATE OF SERVICE:  11/13/2020



REASON FOR CONSULTATION:  Right upper quadrant abdominal pain, probable

cholecystitis. 



SUBJECTIVE:  The patient continues to have right upper quadrant abdominal pain and

right mid back pain that is currently controlled with pain medications.  She has

been able to tolerate an oral diet without difficulty.  Otherwise, she denies any

nausea, vomiting, fevers, chills, or GI bleeding. 



OBJECTIVE:  VITAL SIGNS:  Temperature 97.7, pulse 68. Blood pressure 135/82,

respiratory rate 15, saturating 98% on room air. 

GENERAL:  The patient was lying in bed, in no acute distress.  Alert and oriented

x4. 

CARDIOVASCULAR:  Regular rate and rhythm. 

RESPIRATORY:  Clear to auscultation bilaterally. 

ABDOMEN:  Normoactive bowel sounds, soft, nondistended.  Tenderness to palpation in

the right upper quadrant and right mid abdomen. 

EXTREMITIES:  No cyanosis, clubbing, or edema.



LABORATORY DATA:  No current studies are available for review.



IMAGING DATA:  No current studies are available for review.



ASSESSMENT AND PLAN:  The patient is a 55-year-old  female with past

medical history of metastatic colon cancer status post chemotherapy only (no

surgical resection), and hypertension, now presenting with increased right upper

quadrant abdominal pain and findings of acute cholecystitis on imaging. 



Right upper quadrant abdominal pain/cholecystitis.  The patient initially presented

with increased right upper quadrant abdominal pain that has been progressively

worsening in addition to imaging consistent with gallbladder wall thickening and

pericholecystic fluid without cholelithiasis.  HIDA scan performed on November 12, 2020 also was indicative of cholecystitis.  However, upon further review of a CT

scan during a prior admission/visit, she does have significant inflammation/fibrosis

in the right upper quadrant around the level of the hepatic flexure raising concern

for another underlying inflammatory process with her cholecystitis being more of a

symptom rather than the etiology.  Upon conferring with Dr. Cooper (who performed

her last colonoscopy) he was unable to traverse the proximal ascending colon with

cecal intubation due to extrinsic compression of the colon and creating increased

ulceration and nodularity of the colonic mucosa.  EGD was also performed around the

same time of the colonoscopy, which showed erosions within the gastric antrum and

duodenal bulb with biopsies showing chronic moderately active duodenitis.  Upon

further chart review, the patient was initially diagnosed with colon cancer at

[facility] where colonoscopy showed the presence of a near

obstructive/obstructive colonic mass at approximately 70-80 cm past the anal verge

and at roughly the level of the distal ascending/proximal transverse colon with the

patient not wishing to undergo more surgical resection, but rather chemotherapy

approaches.  There is a high concern of retained colon cancer in this region and/or

inflammation related to the treatment of this particular colon cancer and while

there is mention that the patient may have Crohn disease on the pathology report

from recent colonoscopy, there was no other evidence of Crohn disease on prior

clinic visits nor evidence of Crohn disease on the biopsies taken during the recent

colonoscopy, making this a much less likely diagnosis.  Upon reviewing the patient's

recent CT scans extending back to July of this year there has been wall thickening

involving the region of the cecum and the right colon during this entire time and

with inability to traverse the ascending colon during the most recent colonoscopy is

strongly suspicious for recurrence of her colonic adenocarcinoma. 



RECOMMENDATIONS:  

1. I agree with the General Surgery Service about obtaining a right upper quadrant

ultrasound tomorrow to determine gallbladder size and if the patient is even a

candidate for cholecystostomy tube placement.  I appreciate General Surgery

recommendations. 

2. Pain control per primary team.

3. Would consult Oncology Service for re-evaluation of this patient given the

increased likelihood of recurrent colonic adenocarcinoma.  Unfortunately, the

patient may need an exploratory laparoscopy in order to make a more definitive

diagnosis based on the equivocal finding seen during the recent colonoscopy. 

4. Continue the patient on antibiotics as part of treatment of the inflammation

associated in this region. 



We will continue to follow.  Please call with any questions.







Job ID:  [id number]

## 2020-11-13 NOTE — PDOC.HOSPP
- Subjective


Encounter Date: 11/13/20


Encounter Time: 10:00


Subjective: 


Patient with unchanged abdominal tenderness. No other complaints.





- Objective


Vital Signs & Weight: 


                             Vital Signs (12 hours)











  Temp Pulse Resp BP BP Pulse Ox


 


 11/13/20 03:29  98.5 F  75  18   117/75  97


 


 11/12/20 20:35  97.4 F L  68  20  131/77   97








                                     Weight











Admit Weight                   90 lb


 


Weight                         88 lb 4.8 oz














I&O: 


                                        











 11/12/20 11/13/20 11/14/20





 06:59 06:59 06:59


 


Intake Total 940 2380 


 


Balance 940 2380 











Result Diagrams: 


                                 11/11/20 04:23





                                 11/11/20 04:23





Hospitalist ROS





- Review of Systems


Constitutional: denies: fever, chills


Respiratory: denies: cough, shortness of breath


Cardiovascular: denies: chest pain, palpitations


Gastrointestinal: reports: abdominal pain.  denies: nausea, vomiting





- Medication


Medications: 


Active Medications











Generic Name Dose Route Start Last Admin





  Trade Name Freq  PRN Reason Stop Dose Admin


 


Acetaminophen  650 mg  11/10/20 17:07  11/10/20 20:44





  Acetaminophen 325 Mg Tab  PO   650 mg





  Q4H PRN   Administration





  Headache/Fever/Mild Pain (1-3)  


 


Alendronate Sodium  70 mg  11/11/20 09:00  11/11/20 09:31





  Alendronate Sodium 70 Mg Tablet  PO   Not Given





  Q7D OWEN  


 


Amlodipine Besylate  10 mg  11/10/20 21:00  11/12/20 22:03





  Amlodipine 10 Mg Tab  PO   10 mg





  2100 OWEN   Administration


 


Enoxaparin Sodium  40 mg  11/11/20 09:00  11/12/20 11:49





  Enoxaparin Sodium 40 Mg/0.4 Ml Syringe  SC   Not Given





  0900 OWEN  


 


Gabapentin  300 mg  11/11/20 09:00  11/12/20 22:04





  Gabapentin 300 Mg Cap  PO   300 mg





  TID OWEN   Administration


 


Hydrochlorothiazide  25 mg  11/11/20 09:00  11/12/20 11:51





  Hydrochlorothiazide 25 Mg Tab  PO   Not Given





  DAILY OWEN  


 


Ondansetron HCl  4 mg  11/10/20 17:07  11/12/20 18:01





  Ondansetron Odt 4 Mg Tab  PO   4 mg





  Q6H PRN   Administration





  Nausea/Vomiting  


 


Polyethylene Glycol  17 gm  11/13/20 09:00  11/12/20 22:04





  Polyethylene Glycol 3350 17 Gm Packet  PO   17 gm





  DAILY OWEN   Administration


 


Sodium Chloride  10 ml  11/11/20 09:00  11/12/20 22:04





  Flush - Normal Saline 10 Ml Syringe  IVF   10 ml





  Q12HR OWEN   Administration


 


Tramadol HCl  50 mg  11/11/20 10:24  11/12/20 11:47





  Tramadol Hcl 50 Mg Tab  PO   50 mg





  Q8H PRN   Administration





  Pain  














- Exam


General Appearance: NAD, awake alert


ENT: moist mucosa


Heart: RRR, no murmur, no gallops, no rubs


Respiratory: CTAB, no wheezes, no rales, no ronchi


Gastrointestinal: soft, normal bowel sounds, tender to palpation, voluntary 

guarding


Gastrointestinal - other findings: RUQ worst


Neurological: no focal deficits


Psychiatric: normal affect, normal behavior, A&O x 3





Hosp A/P


(1) Acute cholecystitis without calculus


Code(s): K81.0 - ACUTE CHOLECYSTITIS   Status: Acute   





(2) Generalized weakness


Code(s): R53.1 - WEAKNESS   Status: Acute   





(3) Hepatitis


Status: Acute   





(4) HTN (hypertension)


Code(s): I10 - ESSENTIAL (PRIMARY) HYPERTENSION   Status: Chronic   


Qualifiers: 


   Hypertension type: essential hypertension   Qualified Code(s): I10 - 

Essential (primary) hypertension   





(5) Malignant neoplasm of overlapping sites of colon


Code(s): C18.8 - MALIGNANT NEOPLASM OF OVERLAPPING SITES OF COLON   Status: 

Chronic   





(6) COPD (chronic obstructive pulmonary disease)


Status: Chronic   


Qualifiers: 


   COPD type: chronic bronchitis 





(7) Tobacco abuse


Code(s): Z72.0 - TOBACCO USE   Status: Chronic   





- Plan





US liver with continued biliary dilitation, thickened GB with mild 

pericholecystic fluid, positive U/S Hairston's


HIDA scan positive for cholecystitis. Starting Rocephin. Dr. Antonio doesn't think

a stent will drain the GB well due to no movement and drain is only a 

temporizing measure. Will ask surgery for a formal consult. 


Back pain improved and no evidence bony involvement with tumor on recent CT sca

ns abd/pelvis. Can f/u about this with PCP.

## 2020-11-13 NOTE — CON
DATE OF CONSULTATION:  11/13/2020



CHIEF COMPLAINT:  Right upper quadrant pain.



HISTORY OF PRESENT ILLNESS:  This is a 55-year-old female with known metastatic

colon cancer, who just finished chemotherapy.  She had some pretty significant

intraabdominal lymphadenopathy as well as lymphadenopathy to the chest and the neck.

 She states that right now she is in remission and not on the chemotherapy.  She

presents with right upper-sided abdominal pain.  On previous hospitalization,

underwent upper and lower endoscopy by Dr. Cooper.  She now presents with

unrelenting right upper quadrant pain.  Ultrasound showed no gallstones.  HIDA scan

showed no uptake in the gallbladder.  I was consulted for acute cholecystitis.  The

patient does know right upper quadrant pain and soreness, worse after eating. 



PAST MEDICAL HISTORY:  Includes stage IV colon cancer.



PAST SURGICAL HISTORY:  Hysterectomy, lymph node biopsy



SOCIAL HISTORY:  Smokes.  No alcohol.



ALLERGIES:  NO KNOWN DRUG ALLERGIES.



REVIEW OF SYSTEMS:  Otherwise negative.



PHYSICAL EXAMINATION:

VITAL SIGNS:  Blood pressure is 154/97, pulse 70, respirations 16.  She is afebrile. 

CHEST:  Clear. 

HEART:  Regular rate. 

ABDOMEN:  Soft.  Tender right upper quadrant.  No abdominal or inguinal hernias. 

EXTREMITIES:  No ischemia or edema to extremities.



LABORATORY DATA:  White blood cell count was 5.8 back on the 11th.  Hemoglobin was

normal.  Liver function tests are normal except for alkaline phosphatase, which was

elevated at 490, AST at 63.  HIDA scan shows no uptake.  Ultrasound does not show a

distended gallbladder. 



ASSESSMENT:  Discussed with Dr. Tompkins, Radiology, about cholecystostomy tube.  He

is hesitant to do that because her gallbladder so decompressed on the ultrasound and

previous CT scan.  I think acalculous cholecystitis is unlikely given this fact as

well.  If you look at her CT, she has significant right upper quadrant

retroperitoneal swelling and inflamed lymph nodes, this could all be secondary to

that.  However, if her gallbladder was not dilated, cholecystostomy tube was not

going to help.  She is not a candidate for cholecystectomy that would be dangerous

in the setting of this active severe inflammatory change in the right upper

quadrant.  Treatment would be supportive.  Dr. Tompkins recommended repeat ultrasound

tomorrow.  If the gallbladder is dilated, they can do cholecystostomy tube.  We will

follow with you. 







Job ID:  [id number]

## 2020-11-14 LAB
ANION GAP SERPL CALC-SCNC: 14 MMOL/L (ref 10–20)
BUN SERPL-MCNC: 9 MG/DL (ref 9.8–20.1)
CALCIUM SERPL-MCNC: 9.5 MG/DL (ref 7.8–10.44)
CHLORIDE SERPL-SCNC: 101 MMOL/L (ref 98–107)
CO2 SERPL-SCNC: 26 MMOL/L (ref 22–29)
CREAT CL PREDICTED SERPL C-G-VRATE: 64 ML/MIN (ref 70–130)
GLUCOSE SERPL-MCNC: 94 MG/DL (ref 70–105)
HGB BLD-MCNC: 14.2 G/DL (ref 12–16)
MCH RBC QN AUTO: 32.5 PG (ref 27–31)
MCV RBC AUTO: 95.4 FL (ref 78–98)
MDIFF COMPLETE?: YES
PLATELET # BLD AUTO: 419 THOU/UL (ref 130–400)
POTASSIUM SERPL-SCNC: 3.8 MMOL/L (ref 3.5–5.1)
RBC # BLD AUTO: 4.37 MILL/UL (ref 4.2–5.4)
SODIUM SERPL-SCNC: 137 MMOL/L (ref 136–145)
WBC # BLD AUTO: 5.8 THOU/UL (ref 4.8–10.8)

## 2020-11-14 RX ADMIN — Medication SCH ML: at 22:12

## 2020-11-14 RX ADMIN — CEFTRIAXONE SCH MLS: 2 INJECTION, POWDER, FOR SOLUTION INTRAMUSCULAR; INTRAVENOUS at 09:28

## 2020-11-14 RX ADMIN — Medication SCH ML: at 09:31

## 2020-11-14 NOTE — PRG
DATE OF SERVICE:  11/14/2020



REASON FOR CONSULTATION:  Right upper quadrant abdominal pain, probable

cholecystitis. 



SUBJECTIVE:  The patient continues to have right upper quadrant abdominal pain, 
but

has improved when compared to previous, now stating that the severity is 3-1/2 
out

of 10.  She has been able to tolerate an oral diet without difficulty, however, 
she

has not had a bowel movement in approximately 72 hours and when she did have her

last bowel movement, it was small volume and very solid in nature.  Otherwise, 
she

denies any nausea, vomiting, fevers, chills, or GI bleeding. 



OBJECTIVE:  VITAL SIGNS:  Temperature 97.8, pulse 83, blood pressure 125/79,

respiratory rate 16, saturating 100% on room air. 

GENERAL:  The patient was lying in bed, in no acute distress.  Alert and 
oriented

x4. 

CARDIOVASCULAR:  Regular rate and rhythm. 

RESPIRATORY:  Clear to auscultation bilaterally. 

ABDOMEN:  Normoactive bowel sounds.  Soft, nondistended.  Tenderness to 
palpation in

the right upper quadrant and right mid abdomen. 

EXTREMITIES:  No cyanosis, clubbing, or edema.



LABORATORY DATA:  CBC with a white blood cell count of 5.8, hemoglobin 14.2,

hematocrit 41.7, platelets 419.  Chemistry with a sodium of 137, potassium 3.8,

chloride 101, CO2 of 26, BUN 9, creatinine 0.62, glucose 94. 



IMAGING DATA:  Right upper quadrant ultrasound was performed on November 14, 2020,

which showed a probable exophytic mass adjacent to the pancreas as well as

intrahepatic dilatation.  The size of the gallbladder was relatively unchanged 
when

compared to prior imaging with inability to place a cholecystostomy tube at this

time. 



ASSESSMENT AND PLAN:  The patient is a 55-year-old  female with 
past

medical history of metastatic colon cancer, status post chemotherapy only (no

surgical resection) and hypertension, now presenting with increased right upper

quadrant abdominal pain with findings of possible acute cholecystitis on 
imaging,

but may more be related to recurrence of her colonic adenocarcinoma. 



Right upper quadrant abdominal pain/recurrence of colonic adenocarcinoma. 



With the colonoscopy performed in mid 2019 at [facility], it showed 
a

large near obstructive mass at approximately 70 to 80 cm past the anal verge.  
This

was treated with only chemotherapy with no surgical debulking of the tumor at 
that

time.  She has been followed by the Oncology Service for this chemotherapy with 
the

last round of chemotherapy approximately 6 months ago.  However, with the 
increasing

right upper quadrant abdominal pain, CT scan versus the last admission and the

colonoscopy performed on October 22, 2020, it is strongly concerning for a

recurrence of her colonic adenocarcinoma.  EGD also performed on October 22, 2020,

showed evidence of erosions in the gastric antrum and duodenal bulb, but 
biopsies

only showing moderately active duodenitis at this time and with a high concern 
of

recurrence of her colorectal cancer, I would re-engage the Oncology Service for

recommendations regarding possible recurrence and if tissue is needed for

confirmation of this diagnosis. 



Recommendations:

1. Pain control per primary team.

2. Would re-engage the Oncology Service to re-evaluate the patient given the

increased likelihood of recurrent colonic adenocarcinoma.  Unfortunately, the

patient may need an exploratory laparoscopy in order make more definitive 
diagnosis

of either recurrence or carcinomatosis.  I will order a repeat CT scan to 
determine extent of disease now and better gauge speed of progression.

3. Continue the patient on antibiotics as part of treatment for possible

cholecystitis seen on ultrasound and HIDA scan. 

4. Would place the patient on an increased bowel regimen given the constipation 
she 

is exhibiting likely due to colonic narrowing.  Would also administer magnesium

citrate x1.  If the patient is unable to have a bowel movement with this, then I

would re-engage General Surgery for possible obstructive-type picture and 
possible

colostomy at that time. 





We will continue to follow.  Please call with any questions.





Job ID:  [id number]



[facility]

## 2020-11-14 NOTE — CT
CT Abdomen Pelvis W Con: 11/14/2020 7:33 PM



CLINICAL INFORMATION: Right upper quadrant abdominal pain. History of metastatic colon cancer



COMPARISON: 8/8/2020; 10/12/2020; gallbladder ultrasound 11/14/2020



TECHNIQUE: 

Multiple contiguous axial images were obtained and a CT of the abdomen and pelvis with IV contrast.  
Oral contrast was administered. Coronal and sagittal reformats were performed.



FINDINGS:



Lower Chest: within normal limits.



Abdomen:

Liver: Stable subcentimeter hypodensities in the right lower liver are too small to definitely charac
terize but could represent small cysts.

Bile Ducts: Progressive intrahepatic biliary dilatation is seen which is more prominent in the left l
obe of the liver.

Gallbladder: Hyperdensity in the gallbladder may represent sludge.

Pancreas: within normal limits.

Spleen: within normal limits.

Adrenals: Extensive bilateral adrenal hyperplasia.

Kidneys: Hypodensities in both kidneys likely represent cysts. There is scarring in the posterior asp
ect of the right kidney with cortical thinning



Pelvis:

Reproductive Organs: Status post hysterectomy.

Ureters: within normal limits.

Bladder: within normal limits.



Peritoneum: No ascites or free air, no fluid collection.

Bowel: Normal caliber. There is mild thickening of the wall of the third portion of the duodenum whic
h could be secondary to duodenitis.

Mesentery and Retroperitoneum: The previously seen mildly enlarged peripancreatic lymph nodes are sta
ble in size measuring up to 9 mm in size. The previously seen enlarged mesenteric lymph nodes may

have slightly increased in size near the root of the mesentery measuring up to 11 mm in size. The pre
viously seen enlarged omental lymph nodes along the right aspect of the omentum are stable.

Vessels: Normal. 

Abdominal Wall: within normal limits.

Bones: Degenerative changes in the spine.  



IMPRESSION:





1. Hyperdensity in the gallbladder may represent sludge.

2. Intrahepatic biliary dilatation may have slightly worsened compared to the prior CT. Correlate wit
h LFTs.

3. Renal cysts

4. Extensive bilateral adrenal hyperplasia. Metastases to the adrenal glands cannot be entirely exclu
ded.

5. Enlarged peripancreatic, mesenteric, and omental lymph nodes could represent residual disease.

6. Scattered stable hepatic hypodensities may represent small cysts.

7. Thickening of the wall of the duodenum could be secondary to duodenitis.



Reported By: Sukhi Sung 

Electronically Signed:  11/14/2020 8:03 PM

## 2020-11-14 NOTE — ULT
EXAM: US Gallbladder RUQ



CLINICAL HISTORY: Reevaluate gallbladder..



COMPARISON: 11/11/2020



Correlation: Nuclear medicine HIDA scan 11/12/2020                  



FINDINGS:



Pancreas:  The head and proximal pancreatic body have a normal echotexture. There is a hypoechoic foc
us, peripancreatic in location measuring 0.8 x 0.7 cm. Correlation made with CT 10/12/2020 does

demonstrate an exophytic mass adjacent to the head of the pancreas.



Liver:Hepatic parenchyma has a normal echotexture. No hepatic masses. There does appear to be intrahe
patic biliary dilatation.  Right hepatic lobe: The 20 cm



Gallbladder: No sonographic evidence of cholelithiasis. There is pericholecystic fluid. Gallbladder w
all is thickened measuring 0.4 cm.

Hairston's sign:Negative



Portal Vein: Patent.  Appropriate directional flow



Bile ducts: 0.3 cm common bile duct diameter





Right kidney: No hydronephrosis. Right kidney measures 10.6 x 4.0 x 4.6 cm in length.





IMPRESSION:





1. Nonspecific gallbladder wall thickening and pericholecystic fluid. Previous HIDA scan would sugges
t the possibility of cholecystitis. Currently, the sonographer reports a negative Hairston's sign.

2. Peripancreatic lymph nodes. Refer to CT from 10/12/2020 for further detail.

3. Intrahepatic biliary dilatation

Results study conveyed via [employer] connect to Dr. Perez 11/14/2020 9:04 AM

Code CR



Reported By: Shon Pinedo 

Electronically Signed:  11/14/2020 9:07 AM

## 2020-11-14 NOTE — PRG
DATE OF SERVICE:  11/14/2020



SUBJECTIVE:  Ms. Rojas tolerated her regular diet last night.  She is hungry this

morning.  She had ultrasound this morning showing intraductal and biliary

dilatation, but the gallbladder was normal size without being dilated.  The wall is

thickened.  Discussed with Dr. Pinedo, reviewed HIDA and CT scan.  On abdominal exam,

she still is mild-to-moderately tender in the right upper quadrant. 



ASSESSMENT AND PLAN:  Colon metastatic disease.  I suspect she has carcinomatosis

and that is the source of her abdominal discomfort.  Near obstructing mass in this

area on her colonoscopy on previous hospitalization.  Unfortunately, I do not think

there is anything surgically to be done.  If she is obstructed, she could have

diversion, but I would not perform cholecystectomy.  I think her right upper

quadrant pain is more from this carcinomatosis.  We will follow on an as-needed

basis. 







Job ID:  [id number]

## 2020-11-15 VITALS — TEMPERATURE: 97.7 F | DIASTOLIC BLOOD PRESSURE: 99 MMHG | SYSTOLIC BLOOD PRESSURE: 142 MMHG

## 2020-11-15 LAB
ALP BONE CFR SERPL: 21 % (ref 14–68)
ALP INTEST CFR SERPL: 0 % (ref 0–18)
ALP LIVER CFR SERPL: 79 % (ref 18–85)
ALP SERPL-CCNC: 462 IU/L (ref 39–117)

## 2020-11-15 RX ADMIN — Medication SCH ML: at 08:28

## 2020-11-15 RX ADMIN — CEFTRIAXONE SCH MLS: 2 INJECTION, POWDER, FOR SOLUTION INTRAMUSCULAR; INTRAVENOUS at 08:27

## 2020-11-15 NOTE — PRG
DATE OF SERVICE:  11/15/2020



REASON FOR CONSULTATION:  Right upper quadrant abdominal pain, probable recurrent

adenocarcinoma of the colon. 



SUBJECTIVE:  After administration of magnesium citrate last night, the patient had

numerous bowel movements that were semi-solid to liquid in consistency.  She also

states that her right upper quadrant abdominal pain has also mildly improved or

stable when compared to previous, stating that it is a 3-4/10.  Otherwise, she has

been able to tolerate an oral diet without difficulty.  Otherwise, she denies any

nausea, vomiting, fevers, chills, or GI bleeding. 



OBJECTIVE:  VITAL SIGNS:  Temperature 97.7, pulse 81, blood pressure 142/99,

respiratory rate 18, and saturating 98% on room air. 

GENERAL:  The patient was lying in bed, in no acute distress.  Alert and oriented

x4. 

CARDIOVASCULAR:  Regular rate and rhythm. 

RESPIRATORY:  Clear to auscultation bilaterally. 

ABDOMEN:  Normoactive bowel sounds.  Soft, nondistended.  Tenderness to palpation in

the right upper quadrant and right mid abdomen. 

EXTREMITIES:  No cyanosis, clubbing, or edema.



LABORATORY DATA:  No studies were available for review.



IMAGING DATA:  CT of the abdomen and pelvis was obtained on November 14, 2020, which

showed stable subcentimeter hypodensities in the right lower liver, they were too

small to characterize.  Progressive intrahepatic biliary dilatation was also seen

more prominent in the left lobe of the liver, but no evidence of extrinsic

compression or choledocholithiasis.  There was mild wall thickening of the wall of

the third portion of the duodenum.  Also, seen with a mildly enlarged peripancreatic

lymph nodes that were stable in size as well as the previously seen enlarged

mesenteric lymph nodes may have slightly increased in size near the root of the

mesentery measuring up to 11 mm.  The enlarged omental lymph nodes were also stable. 



ASSESSMENT AND PLAN:  The patient is a 55-year-old  female with past

medical history of metastatic colon cancer, status post chemotherapy only (no

surgical resection) and hypertension, presenting with increased right upper quadrant

abdominal pain, possible acute cholecystitis and more importantly probable

recurrence of her colonic adenocarcinoma. 



Right upper quadrant abdominal pain/recurrence of colonic adenocarcinoma. 



The patient initially had a colonoscopy performed in mid 2019 at [facility], which showed a large near obstructive mass at approximately 70-80 cm past the

anal verge that was treated only with chemotherapy (no surgical debulking).  She has

been followed by the Oncology Service for the chemotherapy with the last round being

approximately six months ago.  However, more recently, she has been having increased

right upper quadrant abdominal pain as well as CT scans both three weeks ago and

yesterday showing either stable or increased size of peripancreatic or mesenteric

lymph nodes concerning for recurrence of malignancy.  She also had a colonoscopy and

EGD performed on October 22, 2020, which showed significant narrowing of the

ascending colon and duodenitis/antritis respectively.  At this time, there is a high

concern of recurrence of her colon cancer that has not been seen intraluminally, but

may be more metastatic within the lymph node itself and causing inflammation in the

right upper quadrant. 



RECOMMENDATIONS:  

1. Pain control per primary team.

2. Would have the patient follow up with the Oncology Service to re-evaluate the

patient given the increased likelihood of recurrent colonic adenocarcinoma.  Based

on the colonoscopy findings, there was nothing from an intraluminal process that

would yield colon cancer and she may need an exploratory laparoscopy in order to

make more definitive diagnosis. 

3. Would finish out the patient's antibiotics as an outpatient.

4. We will continue the patient on MiraLAX 1 capsule twice daily with magnesium

citrate as needed for constipation, most likely associated with colonic luminal 

narrowing secondary to this inflammation/tumor. 

At this time with the patient having bowel movements and pain under decent control,

she could potentially be discharged to home with followup with the outpatient

Oncology Clinic and further management at that time.  Please call with any

questions. 







Job ID:  [id number]

## 2020-11-16 NOTE — DIS
DATE OF ADMISSION:  11/10/2020



DATE OF DISCHARGE:  11/15/2020



DISCHARGE DIAGNOSES:  As of the followin. Abdominal pain.

2. Acute cholecystitis.

3. Malignant neoplasm of the overlying side of the colon.

4. Generalized weakness.

5. Hepatitis.

6. Hypertension.

7. Chronic obstructive pulmonary disease.

8. Tobacco abuse.



HOSPITAL COURSE:  The patient is a 55-year-old female with a history of colon cancer

who got chemotherapy, who presented to the hospital with generalized weakness and

chest pain.  Initially, patient underwent a right upper quadrant ultrasound, which

indicated mild gallbladder wall thickening with adjacent pericholecystic fluid.  At

this time, a HIDA scan was done, which indicated evidence of acute cholecystitis.

However, the patient was able to tolerate her meals.  She had no nausea vomiting.

Her LFTs were mildly elevated, however, initially AST was 63, alkaline phos was 490. 



Initially, she was put on antibiotics.  I did speak with the GI.  She had no

elevated leukocytosis.  Decision was made to stop the antibiotics for now.  The

patient did have a CT of abdomen and pelvis.  She was seen by Surgery and also by

Oncology.  Surgery recommended most likely carcinomatosis, no intervention.  She had

a CT of abdomen and pelvis with contrast, which indicated hyperdensity in the

gallbladder, possibly sludge, intrahepatic biliary dilation, slightly worse.

Extensive bilateral adrenal hyperplasia, most likely secondary to metastasis and

large peripancreatic mesenteric and omental lymph node, __________ hepatic

hypodensities and thickening of the wall of the duodenum.  At this time, she felt

well.  She had no nausea, vomiting.  She was tolerating her food well.  The patient

was then discharged home. 



I have emphasized that she needs to follow up with Hematology-Oncology.  Her

oncologist is Dr. Hughes.  I have encouraged her to follow up next week for

possible restarting of chemotherapy.  I did touch bases with the surgeon who did not

recommend any additional surgical interventions as now.  However, if her mass

increases in size, which causes obstruction she will require diverting colostomy.

However, at this time, no intervention. 



I also touch based with the GI physician, who recommended no antibiotics.  The

patient currently was asymptomatic, and the patient said that she had received

antibiotics while she was in the hospital. 



HOME MEDICATIONS:  Have been resumed, they are;

1. Tramadol 50 mg as needed.

2. Hydrochlorothiazide 25 mg daily.

3. Xanax 0.5 as needed.

4. Gabapentin 300 mg t.i.d.

5. Alendronate every 7 days.

6. Albuterol as needed. 



The patient has been encouraged to follow up.  She has been educated about her CT

findings, which is conclusive of recurrence of disease, and that she will require

chemotherapy.  She understands and she states that she will follow up with her

Primary and Oncology. 



PHYSICAL EXAMINATION:

VITAL SIGNS:  On discharge, temperature 97.7, 81, 18, 98% on room air, 112/69. 

GENERAL:  She is awake, alert, and oriented x3, does not appear in distress. 

CVS:  S1, S2 present.  No murmurs, rubs, gallops. 



Again, she will be discharged home.  She will follow up with Oncology next week and

her Primary. 







Job ID:  [id number]

## 2020-11-20 ENCOUNTER — HOSPITAL ENCOUNTER (INPATIENT)
Dept: HOSPITAL 92 - ERS | Age: 55
LOS: 10 days | Discharge: HOME | DRG: 354 | End: 2020-11-30
Attending: INTERNAL MEDICINE | Admitting: INTERNAL MEDICINE
Payer: COMMERCIAL

## 2020-11-20 VITALS — BODY MASS INDEX: 16.1 KG/M2

## 2020-11-20 DIAGNOSIS — Z79.899: ICD-10-CM

## 2020-11-20 DIAGNOSIS — F20.9: ICD-10-CM

## 2020-11-20 DIAGNOSIS — Z90.710: ICD-10-CM

## 2020-11-20 DIAGNOSIS — Z20.828: ICD-10-CM

## 2020-11-20 DIAGNOSIS — R94.5: ICD-10-CM

## 2020-11-20 DIAGNOSIS — C79.49: ICD-10-CM

## 2020-11-20 DIAGNOSIS — K81.0: ICD-10-CM

## 2020-11-20 DIAGNOSIS — F31.9: ICD-10-CM

## 2020-11-20 DIAGNOSIS — K42.9: ICD-10-CM

## 2020-11-20 DIAGNOSIS — C79.51: ICD-10-CM

## 2020-11-20 DIAGNOSIS — I10: ICD-10-CM

## 2020-11-20 DIAGNOSIS — F12.10: ICD-10-CM

## 2020-11-20 DIAGNOSIS — C18.9: ICD-10-CM

## 2020-11-20 DIAGNOSIS — F17.210: ICD-10-CM

## 2020-11-20 DIAGNOSIS — J44.9: ICD-10-CM

## 2020-11-20 DIAGNOSIS — Z92.21: ICD-10-CM

## 2020-11-20 DIAGNOSIS — E44.0: ICD-10-CM

## 2020-11-20 DIAGNOSIS — C78.89: Primary | ICD-10-CM

## 2020-11-20 DIAGNOSIS — E87.1: ICD-10-CM

## 2020-11-20 LAB
ALBUMIN SERPL BCG-MCNC: 4 G/DL (ref 3.5–5)
ALP SERPL-CCNC: 669 U/L (ref 40–110)
ALT SERPL W P-5'-P-CCNC: 146 U/L (ref 8–55)
ANION GAP SERPL CALC-SCNC: 17 MMOL/L (ref 10–20)
AST SERPL-CCNC: 80 U/L (ref 5–34)
BASOPHILS # BLD AUTO: 0.1 THOU/UL (ref 0–0.2)
BASOPHILS NFR BLD AUTO: 1 % (ref 0–1)
BILIRUB SERPL-MCNC: 0.4 MG/DL (ref 0.2–1.2)
BUN SERPL-MCNC: 12 MG/DL (ref 9.8–20.1)
CALCIUM SERPL-MCNC: 9.8 MG/DL (ref 7.8–10.44)
CHLORIDE SERPL-SCNC: 100 MMOL/L (ref 98–107)
CO2 SERPL-SCNC: 22 MMOL/L (ref 22–29)
CREAT CL PREDICTED SERPL C-G-VRATE: 0 ML/MIN (ref 70–130)
EOSINOPHIL # BLD AUTO: 0.2 THOU/UL (ref 0–0.7)
EOSINOPHIL NFR BLD AUTO: 3.1 % (ref 0–10)
GLOBULIN SER CALC-MCNC: 4.8 G/DL (ref 2.4–3.5)
GLUCOSE SERPL-MCNC: 124 MG/DL (ref 70–105)
HGB BLD-MCNC: 15 G/DL (ref 12–16)
LIPASE SERPL-CCNC: 8 U/L (ref 8–78)
LYMPHOCYTES # BLD: 1 THOU/UL (ref 1.2–3.4)
LYMPHOCYTES NFR BLD AUTO: 14.4 % (ref 21–51)
MCH RBC QN AUTO: 30.2 PG (ref 27–31)
MCV RBC AUTO: 93.9 FL (ref 78–98)
MONOCYTES # BLD AUTO: 0.7 THOU/UL (ref 0.11–0.59)
MONOCYTES NFR BLD AUTO: 10.2 % (ref 0–10)
NEUTROPHILS # BLD AUTO: 4.9 THOU/UL (ref 1.4–6.5)
NEUTROPHILS NFR BLD AUTO: 71.3 % (ref 42–75)
PLATELET # BLD AUTO: 498 THOU/UL (ref 130–400)
POTASSIUM SERPL-SCNC: 3.9 MMOL/L (ref 3.5–5.1)
PROT UR STRIP.AUTO-MCNC: 20 MG/DL
RBC # BLD AUTO: 4.96 MILL/UL (ref 4.2–5.4)
SODIUM SERPL-SCNC: 135 MMOL/L (ref 136–145)
SP GR UR STRIP: 1.06 (ref 1–1.04)
WBC # BLD AUTO: 6.9 THOU/UL (ref 4.8–10.8)

## 2020-11-20 PROCEDURE — 80076 HEPATIC FUNCTION PANEL: CPT

## 2020-11-20 PROCEDURE — 83735 ASSAY OF MAGNESIUM: CPT

## 2020-11-20 PROCEDURE — 72158 MRI LUMBAR SPINE W/O & W/DYE: CPT

## 2020-11-20 PROCEDURE — A9579 GAD-BASE MR CONTRAST NOS,1ML: HCPCS

## 2020-11-20 PROCEDURE — 93005 ELECTROCARDIOGRAM TRACING: CPT

## 2020-11-20 PROCEDURE — 77290 THER RAD SIMULAJ FIELD CPLX: CPT

## 2020-11-20 PROCEDURE — 87635 SARS-COV-2 COVID-19 AMP PRB: CPT

## 2020-11-20 PROCEDURE — 88302 TISSUE EXAM BY PATHOLOGIST: CPT

## 2020-11-20 PROCEDURE — 80053 COMPREHEN METABOLIC PANEL: CPT

## 2020-11-20 PROCEDURE — 77412 RADIATION TX DELIVERY LVL 3: CPT

## 2020-11-20 PROCEDURE — 96375 TX/PRO/DX INJ NEW DRUG ADDON: CPT

## 2020-11-20 PROCEDURE — 88304 TISSUE EXAM BY PATHOLOGIST: CPT

## 2020-11-20 PROCEDURE — 77014: CPT

## 2020-11-20 PROCEDURE — 96374 THER/PROPH/DIAG INJ IV PUSH: CPT

## 2020-11-20 PROCEDURE — S0020 INJECTION, BUPIVICAINE HYDRO: HCPCS

## 2020-11-20 PROCEDURE — 77332 RADIATION TREATMENT AID(S): CPT

## 2020-11-20 PROCEDURE — 76705 ECHO EXAM OF ABDOMEN: CPT

## 2020-11-20 PROCEDURE — 36415 COLL VENOUS BLD VENIPUNCTURE: CPT

## 2020-11-20 PROCEDURE — 77280 THER RAD SIMULAJ FIELD SMPL: CPT

## 2020-11-20 PROCEDURE — 74177 CT ABD & PELVIS W/CONTRAST: CPT

## 2020-11-20 PROCEDURE — 88341 IMHCHEM/IMCYTCHM EA ADD ANTB: CPT

## 2020-11-20 PROCEDURE — 77334 RADIATION TREATMENT AID(S): CPT

## 2020-11-20 PROCEDURE — U0003 INFECTIOUS AGENT DETECTION BY NUCLEIC ACID (DNA OR RNA); SEVERE ACUTE RESPIRATORY SYNDROME CORONAVIRUS 2 (SARS-COV-2) (CORONAVIRUS DISEASE [COVID-19]), AMPLIFIED PROBE TECHNIQUE, MAKING USE OF HIGH THROUGHPUT TECHNOLOGIES AS DESCRIBED BY CMS-2020-01-R: HCPCS

## 2020-11-20 PROCEDURE — 84100 ASSAY OF PHOSPHORUS: CPT

## 2020-11-20 PROCEDURE — S0028 INJECTION, FAMOTIDINE, 20 MG: HCPCS

## 2020-11-20 PROCEDURE — 88342 IMHCHEM/IMCYTCHM 1ST ANTB: CPT

## 2020-11-20 PROCEDURE — 85025 COMPLETE CBC W/AUTO DIFF WBC: CPT

## 2020-11-20 PROCEDURE — 81003 URINALYSIS AUTO W/O SCOPE: CPT

## 2020-11-20 PROCEDURE — 83690 ASSAY OF LIPASE: CPT

## 2020-11-20 PROCEDURE — 77306 TELETHX ISODOSE PLAN SIMPLE: CPT

## 2020-11-20 RX ADMIN — FAMOTIDINE SCH MG: 10 INJECTION, SOLUTION INTRAVENOUS at 23:21

## 2020-11-20 NOTE — PDOC.HHP
Hospitalist HPI





- History of Present Illness


History of Present Illness: 





ADMISSION DATE: 11/20/2020


TIME OF ASSESSMENT: 1800





PRIMARY CARE PHYSICIAN: Tono Madsen





CHIEF COMPLAINT: Abdominal cramping and back pain





HPI: Patient presents to the emergency department today due to new onset 

abdominal cramping and back pain which she states began today.  Reports having 

persistent abdominal discomfort since she was discharged from the hospital on 

11/15/2020 however this has been managed with tramadol at home.  Today however 

she reports having abdominal cramping which she states she has not experienced 

in the past.  Currently the pain that is causing her the most discomfort is pain

in the lower back which started today and is nonradiating.  She states it is a 

10 out of 10 in severity.  Patient upset because she received 1 dose of morphine

at initial presentation and has not had anything further for her discomfort.  

Denies any lower extremity weakness numbness or tingling.  Has not had any 

incontinence of her urine or bowels.  No saddle anesthesia or paresthesias.  

States the pain is a severe aching.  It is not alleviated or worsened by 

anything in particular.  Denies any recent falls or trauma.  Has not had any 

recent fevers chills or sweats.  Denies any urinary symptoms but has developed 

watery stools.  Unable to obtain much information regarding the stools as 

patient states she does not want to talk about anything at the moment and only 

wants to get pain meds to help with her back.





Recently admitted with abdominal pain and found to have acute cholecystitis.  

Received antibiotics and seen by GI.  She was also seen by surgery and had 

imaging including a CT scan and GB US that showed the following: 





CT A/P-


1. Hyperdensity in the gallbladder may represent sludge.


2. Intrahepatic biliary dilatation may have slightly worsened compared to the 

prior CT. 


3. Renal cysts


4. Extensive bilateral adrenal hyperplasia. Metastases to the adrenal glands 

cannot be entirely excluded.


5. Enlarged peripancreatic, mesenteric, and omental lymph nodes could represent 

residual disease.


6. Scattered stable hepatic hypodensities may represent small cysts.


7. Thickening of the wall of the duodenum could be secondary to duodenitis.





GB US done 11/14/2020: 


1. Nonspecific gallbladder wall thickening and pericholecystic fluid. Previous 

HIDA scan would suggest the possibility of cholecystitis. Currently, the 

sonographer reports a negative Hairston's sign.


2. Peripancreatic lymph nodes.


3. Intrahepatic biliary dilatation





Patients symptoms improved and she was tolerating PO intake at time of 

discharge.  Her oncologist is Dr. Hughes and was advised to follow-up with her 

to discuss chemotherapy. No surgical interventions were recommended at that 

time.  





ROS: Apart from what is mentioned above in HPI was difficult to obtain further 

information regarding review systems due to patient being in discomfort.  States

she does not want to talk about anything anymore.  





ED COURSE: EKG done in the ER showed a normal sinus rhythm with a heart of 73.  





Laboratory studies done demonstrated a white cell count of 6.9, hemoglobin 15, 

hematocrit 46.5, platelets 498, neutrophils 71.3%.  Sodium 135, potassium 3.9, 

BUN 12, creatinine 0.66, GFR greater than 90.  Alk phos 669, AST 80, , 

total bilirubin 0.4.  Lipase 8.


Urinalysis was done and showed trace ketones otherwise unremarkable.





For her pain she received morphine 4 mg IV and was also given Zofran 4 mg IV.  

She received IV hydration with 1 L of normal saline and was started on 

antibiotics with Zosyn 4.5g.





She had repeat CT imaging of the abdomen and pelvis done in the emergency which 

has shown the following-


IMPRESSION:


1) Osteolytic metastatic bone lesion at L3 vertebral body protruding into the 

spinal canal.


2) diffuse intrahepatic biliary ductal dilation, unchanged.


3) new finding of contraction and mural enhancement of the gallbladder.


4) possible pancolitis, especially the right colon.


5) new or increased volume of free intraperitoneal fluid in the dependent 

portion of the pelvic cavity.


6) bilateral adrenal hyperplasia versus adrenal metastatic disease.


7) diffuse mesenteric lymphadenopathy and mesenteric edema: Intraperitoneal 

carcinomatosis versus infectious peritonitis versus nonspecific intraperitoneal 

inflammatory process.


8) duodenitis, either primary or reactive secondary





PAST MEDICAL HISTORY:


1.  Metastatic colon cancer.


2.  Asthma.


3.  Acute cholecystitis on recent admission 11/10/2020 to 11/15/2020. 


4.  Hepatitis.


5.  Hypertension.


6.  COPD.


7.  Tobacco abuse. 


8.  Generalized weakness. 


9.  Chronic abdominal pain. 


10.  Bipolar disorder


11.  Schizophrenia





PAST SURGICAL HISTORY: Hysterectomy





SOCIAL HISTORY: Current every day smoker: Tobacco and Marijuana.  She smokes a 

half a pack a day and has been smoking for 30 years.  Reports social alcohol 

consumption.  Smokes marijuana 2 times a week.  Lives with her family. 





FAMILY HISTORY: Noncontributory





ALLERGIES: None





CURRENT MEDICATIONS:


1.  Tramadol 50 mg p.o. as needed


2.  Hydrochlorothiazide 25 mg daily


3.  Xanax 0.5 mg.


4.  Gabapentin 300 mg p.o. 3 times daily


5.  Alendronate every 7 days


6.  Albuterol.








Hospitalist History





- Past Surgical History


Past Surgical History: reports: no pertinent history





- Social History


Alcohol: reports: None


Drugs: reports: marijuana





- Exam


General Appearance: NAD (Appears irritated due to discomfort but no acute 

distress, very thin)


General - other findings: VS temp 98.5, /100, HR 85, RR 20, O2 sat 90% on 

room air.


Eye: PERRL, anicteric sclera


ENT: normocephalic atraumatic, no oropharyngeal lesions, moist mucosa


Neck: supple, no lymphadenopathy


Heart: RRR, normal peripheral pulses


Respiratory: CTAB, normal chest expansion, no tachypnea


Gastrointestinal: soft, non-tender, normal bowel sounds, no guarding, no rigidit

y


Gastrointestinal - other findings: supraumbilical hernia, nontender


Extremities: no edema


Skin: normal turgor, no rashes


Neurological: cranial nerve grossly intact, normal sensation to touch, no wea

kness, no focal deficits


Musculoskeletal: normal tone, normal strength


Musculoskeletal - other findings: discomfort in Lumbar spine region, no bony pr

otrusion/skin changes


Psychiatric: normal affect, normal behavior, A&O x 3





Hospitalist Results





- Labs


Result Diagrams: 


                                 11/20/20 13:34





                                 11/20/20 13:34


Lab results: 


                                        











WBC  6.9 thou/uL (4.8-10.8)   11/20/20  13:34    


 


Hgb  15.0 g/dL (12.0-16.0)   11/20/20  13:34    


 


Hct  46.5 % (36.0-47.0)   11/20/20  13:34    


 


MCV  93.9 fL (78.0-98.0)   11/20/20  13:34    


 


Plt Count  498 thou/uL (130-400)  H  11/20/20  13:34    


 


Neutrophils %  71.3 % (42.0-75.0)   11/20/20  13:34    


 


Sodium  135 mmol/L (136-145)  L  11/20/20  13:34    


 


Potassium  3.9 mmol/L (3.5-5.1)   11/20/20  13:34    


 


Chloride  100 mmol/L ()   11/20/20  13:34    


 


Carbon Dioxide  22 mmol/L (22-29)   11/20/20  13:34    


 


BUN  12 mg/dL (9.8-20.1)   11/20/20  13:34    


 


Creatinine  0.66 mg/dL (0.6-1.1)   11/20/20  13:34    


 


Glucose  124 mg/dL ()  H  11/20/20  13:34    


 


Calcium  9.8 mg/dL (7.8-10.44)   11/20/20  13:34    


 


Total Bilirubin  0.4 mg/dL (0.2-1.2)   11/20/20  13:34    


 


AST  80 U/L (5-34)  H  11/20/20  13:34    


 


ALT  146 U/L (8-55)  H  11/20/20  13:34    


 


Alkaline Phosphatase  669 U/L ()  H  11/20/20  13:34    


 


Serum Total Protein  8.8 g/dL (6.0-8.3)  H  11/20/20  13:34    


 


Albumin  4.0 g/dL (3.5-5.0)   11/20/20  13:34    


 


Lipase  8 U/L (8-78)   11/20/20  13:34    


 


Urine Ketones  Trace mg/dL (Negative)  A  11/20/20  17:34    


 


Urine Blood  Negative  (Negative)   11/20/20  17:34    


 


Urine Nitrite  Negative  (Negative)   11/20/20  17:34    


 


Ur Leukocyte Esterase  Negative Sharon/uL (Negative)   11/20/20  17:34    














- Radiology Interpretation


  ** CT scan - abdomen


Status: report reviewed by me





Hospitalist H&P A/P





- Problem


(1) Back pain


Code(s): M54.9 - DORSALGIA, UNSPECIFIED   Status: Acute   


Assessment and Plan: 


Secondary to metastatic lesion involving L3 with extension into anterior spinal 

canal, per CT.


Would benefit from further spine imaging.


No neuro deficits on exam, therefore will let Oncology team decide if indicated 

while in hospital vs. outpatient.


May benefit from palliative radiation therapy given severe pain, to be 

determined by Oncology team.


Given second dose of Morphine for pain in ED. 


If no improvement, will try Toradol.


Oncology consult placed. 








(2) Abdominal pain


Code(s): R10.9 - UNSPECIFIED ABDOMINAL PAIN   Status: Chronic   


Assessment and Plan: 


Secondary to known metatatic colon cancer. 


Has improved, will continue tramadol which she takes at home. 


CT imaging notable for new contraction/mural enhancement of gallbladder. 


?Intraperitoneal carcinomatosis vs. pertionitis. 


Patient afebrile and with normal WCC but has been started on IV antibiotics. 


Will obtain GB US. 











(3) Metastatic colon cancer in female


Code(s): C18.9 - MALIGNANT NEOPLASM OF COLON, UNSPECIFIED   Status: Chronic   


Assessment and Plan: 


As mentioned above. 








(4) Elevated LFTs


Code(s): R79.89 - OTHER SPECIFIED ABNORMAL FINDINGS OF BLOOD CHEMISTRY   Status:

Acute   


Assessment and Plan: 


Recently treated for acute cholecystitis, no significant abdominal pain at pre

sent. 


CT notable for diffuse intrahepatic dilatation that appears stable, however LFTs

elevated compared to recent admission.


No surgery was recommended at that time due to improvement in symptoms and labs.




Per discussion with Dr. Albarran, consult placed to surgery to re-assess for 

surgery


GB US ordered.


Continue to monitor CMP. 








(5) HTN (hypertension)


Code(s): I10 - ESSENTIAL (PRIMARY) HYPERTENSION   Status: Chronic   


Qualifiers: 


   Hypertension type: essential hypertension   Qualified Code(s): I10 - 

Essential (primary) hypertension   


Assessment and Plan: 


BP elevated, uncontrolled.


May improve once pain under control. 


Resume home medications once verified. 


Continue to monitor BP. 








(6) COPD (chronic obstructive pulmonary disease)


Status: Chronic   


Qualifiers: 


   COPD type: chronic bronchitis 


Assessment and Plan: 


Resume home inhalers. 











(7) Bipolar disorder


Code(s): F31.9 - BIPOLAR DISORDER, UNSPECIFIED   Status: Chronic   


Assessment and Plan: 


Resume home medications once verified. 








(8) Schizophrenia


Code(s): F20.9 - SCHIZOPHRENIA, UNSPECIFIED   Status: Chronic   


Assessment and Plan: 


Resume home medications once verified. 








- Plan


Plan: 





GI Prophylaxis with Famotidine


DVT Prophylaxis with Lovenox


CODE STATUS & MPOA: Unknown, patient does not wish to discuss at this time. 





Case discussed with attending who agrees with plan as above.

## 2020-11-20 NOTE — CT
CT ABDOMEN WITH CONTRAST

CT PELVIS WITH CONTRAST:



DATE:

11/20/2020



HISTORY:

55-year-old female with abdominal pain, nausea, vomiting, and chills. States for colon cancer.



COMPARISON:

11/14/2020



TECHNIQUE:

IV injection of iodinated contrast media: administered.

Oral contrast media:Not administered



FINDINGS:

There is an approximately 2 x 1.5 x 1 cm osteolytic lesion at the right posterior aspect of the L3 ve
rtebral body. The soft tissue component of the tumor protrudes into the right anterior aspect of

spinal canal, indenting the thecal sac.

Vertebral body heights are maintained.

Mural thickening and mural enhancement of first and second stages of duodenum.

Significant thickening of bilateral adrenal glands: Hyperplasia versus metastatic lesions.

Unremarkable pancreas.

No hydronephrosis.

Parenchymal defect representing scar at posterior lateral aspect of right renal mid-upper pole.

No splenomegaly.

No abdominal aortic aneurysm.

Edema throughout the mesentery.

Small-moderate amount of free fluid within the dependent portion of the pelvic cavity, new or greater
 than on the previous CT.

Large number of moderately enlarged mesenteric lymph nodes throughout the peritoneal cavity.

No small bowel dilation.

Difficult to evaluate collapsed descending colon.

Oral contrast material from the CT 6 days ago has reached the cecum and hepatic flexure of colon. Dif
fuse mural thickening of right hemicolon. Probable diffuse mucosal thickening throughout the rest

of the colon.

No pleural effusion or pneumoperitoneum.

Diffuse dilation of intrahepatic biliary tree.

Contracted gallbladder with mural thickening, mural enhancement, and pericholecystic edema. These are
 all new findings..



IMPRESSION:

1) Osteolytic metastatic bone lesion at L3 vertebral body protruding into the spinal canal.

2) diffuse intrahepatic biliary ductal dilation, unchanged.

3) new finding of contraction and mural enhancement of the gallbladder.

4) possible pancolitis, especially the right colon.

5) new or increased volume of free intraperitoneal fluid in the dependent portion of the pelvic cavit
y.

6) bilateral adrenal hyperplasia versus adrenal metastatic disease.

7) diffuse mesenteric lymphadenopathy and mesenteric edema: Intraperitoneal carcinomatosis versus inf
ectious peritonitis versus nonspecific intraperitoneal inflammatory process.

8) duodenitis, either primary or reactive secondary



Reported By: Tan Bey 

Electronically Signed:  11/20/2020 3:29 PM

## 2020-11-21 LAB
ALBUMIN SERPL BCG-MCNC: 3.5 G/DL (ref 3.5–5)
ALP SERPL-CCNC: 521 U/L (ref 40–110)
ALT SERPL W P-5'-P-CCNC: 108 U/L (ref 8–55)
ANION GAP SERPL CALC-SCNC: 15 MMOL/L (ref 10–20)
AST SERPL-CCNC: 75 U/L (ref 5–34)
BASOPHILS # BLD AUTO: 0.1 THOU/UL (ref 0–0.2)
BASOPHILS NFR BLD AUTO: 1.4 % (ref 0–1)
BILIRUB SERPL-MCNC: 0.5 MG/DL (ref 0.2–1.2)
BUN SERPL-MCNC: 10 MG/DL (ref 9.8–20.1)
CALCIUM SERPL-MCNC: 9.3 MG/DL (ref 7.8–10.44)
CHLORIDE SERPL-SCNC: 103 MMOL/L (ref 98–107)
CO2 SERPL-SCNC: 21 MMOL/L (ref 22–29)
CREAT CL PREDICTED SERPL C-G-VRATE: 64 ML/MIN (ref 70–130)
EOSINOPHIL # BLD AUTO: 0.4 THOU/UL (ref 0–0.7)
EOSINOPHIL NFR BLD AUTO: 6.2 % (ref 0–10)
GLOBULIN SER CALC-MCNC: 4.2 G/DL (ref 2.4–3.5)
GLUCOSE SERPL-MCNC: 102 MG/DL (ref 70–105)
HGB BLD-MCNC: 14 G/DL (ref 12–16)
LYMPHOCYTES # BLD: 0.8 THOU/UL (ref 1.2–3.4)
LYMPHOCYTES NFR BLD AUTO: 12.5 % (ref 21–51)
MCH RBC QN AUTO: 31.3 PG (ref 27–31)
MCV RBC AUTO: 92.4 FL (ref 78–98)
MONOCYTES # BLD AUTO: 0.5 THOU/UL (ref 0.11–0.59)
MONOCYTES NFR BLD AUTO: 8.4 % (ref 0–10)
NEUTROPHILS # BLD AUTO: 4.7 THOU/UL (ref 1.4–6.5)
NEUTROPHILS NFR BLD AUTO: 71.6 % (ref 42–75)
PLATELET # BLD AUTO: 405 THOU/UL (ref 130–400)
POTASSIUM SERPL-SCNC: 3.8 MMOL/L (ref 3.5–5.1)
RBC # BLD AUTO: 4.49 MILL/UL (ref 4.2–5.4)
SODIUM SERPL-SCNC: 135 MMOL/L (ref 136–145)
WBC # BLD AUTO: 6.5 THOU/UL (ref 4.8–10.8)

## 2020-11-21 RX ADMIN — FAMOTIDINE SCH MG: 10 INJECTION, SOLUTION INTRAVENOUS at 21:53

## 2020-11-21 RX ADMIN — FAMOTIDINE SCH MG: 10 INJECTION, SOLUTION INTRAVENOUS at 08:43

## 2020-11-21 NOTE — CON
DATE OF CONSULTATION:  



REASON FOR CONSULTATION:  Colon cancer.



HISTORY OF PRESENT ILLNESS:  Ms. Rojas is a pleasant 55-year-old female who has

stage IV metastatic adenocarcinoma of the colon.  She completed chemotherapy 
with

FOLFOX and Avastin in April of 2020.  She declined maintenance chemotherapy, but
has

been doing well since that time.  She had a CT scan in October that showed 
stable

peritoneal carcinomatosis.  She had a recent colonoscopy, which showed no signs 
of

obstruction.  Earlier this month, she began to have right upper quadrant 
abdominal

pain and low back pain that radiated down the right leg.  She was admitted to 
this

facility on November 10th and discharged on the 15th.  She underwent evaluation 
for

her gallbladder.  She was seen by the surgeon who recommended no intervention.  
She

returned yesterday with worsening back pain.  CT scan showed a 2 x 1.5 x 1 cm

osteolytic lesion in the right posterior aspect of the L3 vertebral body.  The 
soft

tissue component of the tumor protrudes into the right anterior aspect of the 
spinal

canal indenting the thecal sac.  She had contraction and enhancement of the

gallbladder.  She had diffuse mesentery lymphadenopathy consistent with 
peritoneal

carcinomatosis.  She has been given pain medication with resolution in her pain.

She denies any difficulty standing, walking, urinating or having a bowel 
movement.

She does have intermittent numbness of her right leg.  In the emergency room, 
she

had a liver profile done, which showed stable elevation of her liver function 
tests.

 The patient was seen at bedside with her significant other present.  She denies

pain at this time. 



PAST MEDICAL HISTORY:  

1. Stage IV colon cancer.

2. Hypertension.

3. Bipolar schizophrenia.



PAST SURGICAL HISTORY:  

1. Hysterectomy.

2. Lymph node biopsy.



ALLERGIES:  NO KNOWN DRUG ALLERGIES.



HOME MEDICATIONS:  

1. Fosamax.

2. MiraLAX.

3. Neurontin.

4. Norvasc.

5. Spiriva.

6. Tramadol.

7. Xanax.

8. Proventil.



FAMILY HISTORY:  Noncontributory.



SOCIAL HISTORY:  Single, lives with significant other.  Denies alcohol, tobacco,
or

illicit drug use. 



REVIEW OF SYSTEMS:  Twelve-point review of systems is negative except for noted 
in

HPI. 



PHYSICAL EXAMINATION:

VITAL SIGNS:  Temperature 97.5, pulse is 68, respiratory rate 15, BP is 163/93. 
She

is 99% on room air. 

GENERAL:  This is a well-developed, well-nourished female, in no acute distress.


HEENT:  Normocephalic and atraumatic.  Pupils are equal and reactive to light. 

NECK:  Supple. 

CARDIOVASCULAR:  Regular rate and rhythm. 

LUNGS:  Clear. 

ABDOMEN:  Soft and nontender.  Bowel sounds are positive. 

EXTREMITIES:  She has no clubbing or cyanosis. 

NEUROLOGIC:  She has 5/5 strength in all extremities.



PERTINENT LABORATORY DATA AND X-RAYS:  WBCs 6.5, hemoglobin 14, hematocrit 41.5,

platelet count is 405,000, 71% neutrophils, 12% lymphocytes.  Sodium 135, 
potassium

3.8, chloride 103, CO2 is 21, BUN is 10, creatinine 0.66, calcium 9.3, bilirubin

0.5, AST 75, , alkaline phosphatase is 521.  Serum total protein 7.7, 
albumin

3.5, globulin 4.2.  Urine is negative.  COVID negative.  Radiology per HPI. 



ASSESSMENT:  

1. Metastatic colon cancer.

2. L3 osteolytic lesion with spinal canal protrusion.



DISCUSSION:  The patient has intermittent right leg numbness and tingling. An

MRI will be ordered to further assess the L3 lesion.  We will consider steroids 
after MRI.

She may be a candidate for radiation to this area to relieve the

pressure.  We will consult the radiation oncologist on Monday morning.  Continue

pain control and nausea medicine as needed.  Case will be discussed with Dr. Mora. 



Thank you for the consult.







Job ID:  [id number]



MTDD

## 2020-11-21 NOTE — PDOC.HOSPP
- Subjective


Encounter Date: 11/21/20


Encounter Time: 08:45


Subjective: 


Patient seen and examined bedside today, patient has mainly complaint of back 

pain as well as right upper quadrant pain, no fever, no nausea or vomiting,





- Objective


Vital Signs & Weight: 


                             Vital Signs (12 hours)











  Temp Pulse Resp BP Pulse Ox


 


 11/21/20 11:15  97.5 F L  68  15  163/93 H  99


 


 11/21/20 08:00      99


 


 11/21/20 07:19  98.5 F  68  16  173/97 H  99


 


 11/21/20 04:00  98.1 F  61  16  165/94 H  97


 


 11/21/20 00:00  98.2 F  85  18  154/96 H  99








                                     Weight











Weight                         94 lb














Result Diagrams: 


                                 11/21/20 07:40





                                 11/21/20 07:40


Radiology Reviewed by me: Yes





Hospitalist ROS





- Review of Systems


Eyes: denies: pain, vision change, conjunctivae inflammation, eyelid 

inflammation, redness, other


ENT: denies: ear pain, ear discharge, nose pain, nose discharge, nose congest

ion, mouth pain, mouth swelling, throat pain, throat swelling, other


Respiratory: denies: cough, dry, shortness of breath, hemoptysis, SOB with 

excertion, pleuritic pain, sputum, wheezing, other


Cardiovascular: denies: chest pain, palpitations, orthopnea, paroxysmal noc. 

dyspnea, edema, light headedness, other


Gastrointestinal: reports: abdominal pain.  denies: nausea, vomiting, diarrhea, 

constipation, melena, hematochezia, other


Genitourinary: denies: dysuria, frequency, incontinence, hematuria, retention, 

other


Musculoskeletal: reports: back pain.  denies: neck pain, shoulder pain, arm 

pain, hand pain, leg pain, foot pain, other





- Medication


Medications: 


Active Medications











Generic Name Dose Route Start Last Admin





  Trade Name Freq  PRN Reason Stop Dose Admin


 


Famotidine  20 mg  11/20/20 21:00  11/21/20 08:43





  Famotidine/Pf 20 Mg/2ml Vial  SLOW IVP   20 mg





  Q12HR OWEN   Administration


 


Gabapentin  300 mg  11/21/20 09:00  11/21/20 08:42





  Gabapentin 300 Mg Cap  PO   300 mg





  BID OWEN   Administration


 


Polyethylene Glycol  17 gm  11/21/20 09:00  11/21/20 08:43





  Polyethylene Glycol 3350 17 Gm Packet  PO   17 gm





  BID OWEN   Administration


 


Tramadol HCl  50 mg  11/21/20 02:26  11/21/20 08:45





  Tramadol Hcl 50 Mg Tab  PO   50 mg





  Q4H PRN   Administration





  Moderate Pain (4-6)  














- Exam


General Appearance: NAD, awake alert


Eye: PERRL, anicteric sclera


ENT: normocephalic atraumatic, no oropharyngeal lesions


Neck: supple, symmetric, no JVD, no thyromegaly


Heart: RRR, no murmur, no gallops, no rubs


Respiratory: no wheezes, no rales, no ronchi


Gastrointestinal: soft, non-distended, normal bowel sounds


Gastrointestinal - other findings: Right upper quadrant tenderness noted,


Extremities: no cyanosis, no clubbing, no edema


Skin: normal turgor, no lesions


Neurological: no focal deficits


Musculoskeletal: normal tone, normal strength


Psychiatric: normal affect, normal behavior





Hosp A/P


(1) Acute cholecystitis without calculus


Code(s): K81.0 - ACUTE CHOLECYSTITIS   Status: Acute   





(2) Back pain


Code(s): M54.9 - DORSALGIA, UNSPECIFIED   Status: Acute   


Plan: 


Back pain is most likely related with osteolytic lesion at the right posterior 

aspect of L3 vertebral body, oncology has been consulted, further investigation 

will defer to oncology, MRI versus radiation








(3) Elevated LFTs


Code(s): R79.89 - OTHER SPECIFIED ABNORMAL FINDINGS OF BLOOD CHEMISTRY   Status:

Acute   


Plan: 


Multifactorial probably related with metastatic colon cancer versus acalculous 

cholecystitis








(4) Abdominal pain


Code(s): R10.9 - UNSPECIFIED ABDOMINAL PAIN   Status: Chronic   


Qualifiers: 


   Abdominal location: right upper quadrant   Qualified Code(s): R10.11 - Right 

upper quadrant pain   


Plan: 


Suspecting from a calculus cholecystitis








(5) Bipolar disorder


Code(s): F31.9 - BIPOLAR DISORDER, UNSPECIFIED   Status: Chronic   





(6) Metastatic colon cancer in female


Code(s): C18.9 - MALIGNANT NEOPLASM OF COLON, UNSPECIFIED   Status: Chronic   





(7) Schizophrenia


Code(s): F20.9 - SCHIZOPHRENIA, UNSPECIFIED   Status: Chronic   





(8) COPD (chronic obstructive pulmonary disease)


Status: Chronic   


Qualifiers: 


   COPD type: chronic bronchitis 





(9) HTN (hypertension)


Code(s): I10 - ESSENTIAL (PRIMARY) HYPERTENSION   Status: Chronic   


Qualifiers: 


   Hypertension type: essential hypertension   Qualified Code(s): I10 - 

Essential (primary) hypertension   





(10) Tobacco abuse


Code(s): Z72.0 - TOBACCO USE   Status: Chronic   





(11) Moderate protein-calorie malnutrition


Code(s): E44.0 - MODERATE PROTEIN-CALORIE MALNUTRITION   Status: Chronic   





- Plan


old records reviewed/req





Oncology has been consulted, further investigation will defer to them,


General surgery has been consulted to give opinion regarding cholecystectomy


We will repeat labs tomorrow


Pain control


Medication reviewed and continued by symptomatic and supportive care


Her home medication has been reconciled

## 2020-11-21 NOTE — CON
DATE OF CONSULTATION:  11/21/2020



This is Ara Jaquez NP dictating a report for Dr. Lizarraga.



REQUESTING PHYSICIAN:  [facility].



CHIEF COMPLAINT:  Abdominal pain and back pain concerning for cholecystitis.



SUBJECTIVE:  This is a 55-year-old female with past medical history of colon cancer.

 The patient states she is in remission.  Last chemo was in April 2020.  The patient

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  The patient reports

abdominal pain rating at 10/10 that was different than when she was seen a couple of

weeks ago.  The patient also reports watery stools.  The patient denies any fever or

chills.  The patient is currently able to tolerate a diet and denies any nausea or

vomiting.  The patient was evaluated on 11/15 by General Surgery, who recommended no

surgical intervention and the patient was discharged home with tramadol.  Surgical

intervention was not recommended due to severe active inflammation and changes in

the right upper quadrant.  It was recommended that the patient have a repeat

abdominal ultrasound and if there is any gallbladder dilation, a possible

cholecystostomy tube may need to be placed.  The patient's liver enzymes were also

noted to be trending upwards. 



PAST MEDICAL HISTORY:  

1. Metastatic colon cancer.

2. Hepatitis.

3. Hypertension.

4. COPD.

5. Bipolar.

6. Schizophrenia.

7. General weakness.



PAST SURGICAL HISTORY:  Hysterectomy.



SOCIAL HISTORY:  Marijuana use, currently smokes half-pack a day for 30 years.



SUBJECTIVE:  VITAL SIGNS:  Temperature 97.9, pulse 74, respirations 16, SpO2 of

100%, blood pressure 157/98. 

GENERAL:  Well-appearing middle-aged female, awake, alert, in no distress. 

HEENT:  Head is atraumatic and normocephalic. 

CHEST:  Respirations are even and nonlabored, bilateral breath sounds clear. 

CARDIAC:  Regular rate, regular rhythm. 

ABDOMEN:  Soft, tenderness in right upper quadrant, umbilical hernia present and

reducible, no peritoneal signs. 

EXTREMITIES:  Moves all extremities, neurovascularly intact x4.



LABORATORY DATA:  WBC 6.5, RBC 4.49, hemoglobin 14.0, hematocrit 41.5, platelets 405.



DIAGNOSTIC DATA:  Abdominal ultrasound was reviewed by Dr. Lizarraga.  Thickened

gallbladder and edematous sludge noted, no stones. 



ASSESSMENT:  

1. Cholecystitis, tolerating diet.

2. Back pain, likely due to her bone metastasis.



PLAN:  Surgical team will speak with Oncology on Monday to determine the plan.  No

acute surgical indication at this time due to active severe inflammation in the

right upper quadrant.  The patient may need a cholecystostomy tube placed if

continued right upper quadrant pain.  Again, we will discuss plan with Oncology on

Monday.  The patient may have regular diet as tolerated.  Surgery will continue to

follow. 







Job ID:  [id number]

## 2020-11-21 NOTE — EKG
Test Reason : 

Blood Pressure : ***/*** mmHG

Vent. Rate : 082 BPM     Atrial Rate : 082 BPM

   P-R Int : 130 ms          QRS Dur : 082 ms

    QT Int : 386 ms       P-R-T Axes : 068 009 058 degrees

   QTc Int : 450 ms

 

Normal sinus rhythm

Normal ECG

 

Confirmed by VICK JO ([id number]),  ADAMS STRICKALND ([id number]) on 11/21/2020 4:00:35 PM

 

Referred By:             Confirmed By:VICK JO

## 2020-11-21 NOTE — ULT
Exam:

Right upper quadrant ultrasound:



HISTORY:

Abnormal CT follow-up



COMPARISON:

Abdomen and pelvic CT, 11/20/2020



FINDINGS:

Visualized liver:Very heterogeneous coarse liver echogenicity evidence for nonspecific hepatic parenc
hymal process.

Gallbladder:Abnormal thickening and edematous changes of the gallbladder with some intraluminal sludg
e but without overt gallstones.

Common bile duct:Dilated intrahepatic ducts without a significantly dilated common bile duct. Negativ
e Hairston's sign.

The visualized pancreas and right kidney are unremarkable.

Small amount of ascites at the edge of the liver, right lobe.





IMPRESSION:

Coarse echogenicity within the liver with intrahepatic ductal dilatation without common bile duct dil
atation.

Small amount of ascites.

Thickened gallbladder wall with sludge without overt gallstones.



Reported By: King Smith 

Electronically Signed:  11/21/2020 11:57 AM

## 2020-11-22 LAB
ALBUMIN SERPL BCG-MCNC: 3.6 G/DL (ref 3.5–5)
ALP SERPL-CCNC: 537 U/L (ref 40–110)
ALT SERPL W P-5'-P-CCNC: 92 U/L (ref 8–55)
ANION GAP SERPL CALC-SCNC: 15 MMOL/L (ref 10–20)
AST SERPL-CCNC: 77 U/L (ref 5–34)
BASOPHILS # BLD AUTO: 0.1 THOU/UL (ref 0–0.2)
BASOPHILS NFR BLD AUTO: 0.8 % (ref 0–1)
BILIRUB SERPL-MCNC: 0.5 MG/DL (ref 0.2–1.2)
BUN SERPL-MCNC: 6 MG/DL (ref 9.8–20.1)
CALCIUM SERPL-MCNC: 9.4 MG/DL (ref 7.8–10.44)
CHLORIDE SERPL-SCNC: 103 MMOL/L (ref 98–107)
CO2 SERPL-SCNC: 22 MMOL/L (ref 22–29)
CREAT CL PREDICTED SERPL C-G-VRATE: 66 ML/MIN (ref 70–130)
EOSINOPHIL # BLD AUTO: 1.3 THOU/UL (ref 0–0.7)
EOSINOPHIL NFR BLD AUTO: 20.9 % (ref 0–10)
GLOBULIN SER CALC-MCNC: 4.3 G/DL (ref 2.4–3.5)
GLUCOSE SERPL-MCNC: 84 MG/DL (ref 70–105)
HGB BLD-MCNC: 14.9 G/DL (ref 12–16)
LYMPHOCYTES # BLD: 0.9 THOU/UL (ref 1.2–3.4)
LYMPHOCYTES NFR BLD AUTO: 13.6 % (ref 21–51)
MAGNESIUM SERPL-MCNC: 2.3 MG/DL (ref 1.6–2.6)
MCH RBC QN AUTO: 32.7 PG (ref 27–31)
MCV RBC AUTO: 94.1 FL (ref 78–98)
MONOCYTES # BLD AUTO: 0.7 THOU/UL (ref 0.11–0.59)
MONOCYTES NFR BLD AUTO: 11.5 % (ref 0–10)
NEUTROPHILS # BLD AUTO: 3.4 THOU/UL (ref 1.4–6.5)
NEUTROPHILS NFR BLD AUTO: 53.1 % (ref 42–75)
PLATELET # BLD AUTO: 421 THOU/UL (ref 130–400)
POTASSIUM SERPL-SCNC: 3.8 MMOL/L (ref 3.5–5.1)
RBC # BLD AUTO: 4.56 MILL/UL (ref 4.2–5.4)
SODIUM SERPL-SCNC: 136 MMOL/L (ref 136–145)
WBC # BLD AUTO: 6.4 THOU/UL (ref 4.8–10.8)

## 2020-11-22 RX ADMIN — FAMOTIDINE SCH MG: 10 INJECTION, SOLUTION INTRAVENOUS at 08:15

## 2020-11-22 RX ADMIN — FAMOTIDINE SCH MG: 10 INJECTION, SOLUTION INTRAVENOUS at 21:21

## 2020-11-22 NOTE — PDOC.HOSPP
- Subjective


Encounter Date: 11/22/20


Encounter Time: 08:20


Subjective: 


Patient seen and examined. No new complaints. No overnight events





- Objective


Vital Signs & Weight: 


                             Vital Signs (12 hours)











  Temp Pulse Resp BP Pulse Ox


 


 11/22/20 08:00      100


 


 11/22/20 07:42  98.2 F  64  16  153/95 H  100


 


 11/22/20 00:00  98.1 F  68  18  135/88  97








                                     Weight











Admit Weight                   94 lb


 


Weight                         94 lb














I&O: 


                                        











 11/21/20 11/22/20 11/23/20





 06:59 06:59 06:59


 


Intake Total  1050 


 


Balance  1050 











Result Diagrams: 


                                 11/22/20 06:24





                                 11/22/20 06:24





Hospitalist ROS





- Review of Systems


ENT: denies: ear pain, ear discharge, nose pain, nose discharge, nose 

congestion, mouth pain, mouth swelling, throat pain, throat swelling, other


Respiratory: denies: cough, dry, shortness of breath, hemoptysis, SOB with 

excertion, pleuritic pain, sputum, wheezing, other


Cardiovascular: denies: chest pain, palpitations, orthopnea, paroxysmal noc. 

dyspnea, edema, light headedness, other


Gastrointestinal: reports: abdominal pain.  denies: nausea, vomiting, diarrhea, 

constipation, melena, hematochezia, other


Genitourinary: denies: dysuria, frequency, incontinence, hematuria, retention, 

other


Musculoskeletal: reports: back pain.  denies: neck pain, shoulder pain, arm 

pain, hand pain, leg pain, foot pain, other





- Medication


Medications: 


Active Medications











Generic Name Dose Route Start Last Admin





  Trade Name Freq  PRN Reason Stop Dose Admin


 


Famotidine  20 mg  11/20/20 21:00  11/22/20 08:15





  Famotidine/Pf 20 Mg/2ml Vial  SLOW IVP   20 mg





  Q12HR OWEN   Administration


 


Gabapentin  300 mg  11/21/20 09:00  11/22/20 08:15





  Gabapentin 300 Mg Cap  PO   300 mg





  BID OWEN   Administration


 


Polyethylene Glycol  17 gm  11/21/20 09:00  11/22/20 08:18





  Polyethylene Glycol 3350 17 Gm Packet  PO   17 gm





  BID OWEN   Administration


 


Tramadol HCl  50 mg  11/21/20 02:26  11/22/20 08:17





  Tramadol Hcl 50 Mg Tab  PO   50 mg





  Q4H PRN   Administration





  Moderate Pain (4-6)  














- Exam


General Appearance: NAD, awake alert


Eye: PERRL, anicteric sclera


ENT: normocephalic atraumatic, no oropharyngeal lesions


Neck: symmetric, no JVD, no thyromegaly


Heart: RRR, no murmur, no gallops, no rubs


Respiratory: CTAB, no wheezes, no rales, no ronchi


Gastrointestinal: soft, normal bowel sounds


Extremities: no cyanosis, no clubbing, no edema


Skin: normal turgor, no lesions


Neurological: no focal deficits


Musculoskeletal: normal tone, normal strength


Psychiatric: normal affect, normal behavior





Hosp A/P


(1) Acute cholecystitis without calculus


Code(s): K81.0 - ACUTE CHOLECYSTITIS   Status: Acute   





(2) Back pain


Code(s): M54.9 - DORSALGIA, UNSPECIFIED   Status: Acute   





(3) Elevated LFTs


Code(s): R79.89 - OTHER SPECIFIED ABNORMAL FINDINGS OF BLOOD CHEMISTRY   Status:

Acute   





(4) Abdominal pain


Code(s): R10.9 - UNSPECIFIED ABDOMINAL PAIN   Status: Chronic   


Qualifiers: 


   Abdominal location: right upper quadrant   Qualified Code(s): R10.11 - Right 

upper quadrant pain   





(5) Bipolar disorder


Code(s): F31.9 - BIPOLAR DISORDER, UNSPECIFIED   Status: Chronic   





(6) Metastatic colon cancer in female


Code(s): C18.9 - MALIGNANT NEOPLASM OF COLON, UNSPECIFIED   Status: Chronic   





(7) Schizophrenia


Code(s): F20.9 - SCHIZOPHRENIA, UNSPECIFIED   Status: Chronic   





(8) COPD (chronic obstructive pulmonary disease)


Status: Chronic   


Qualifiers: 


   COPD type: chronic bronchitis 





(9) HTN (hypertension)


Code(s): I10 - ESSENTIAL (PRIMARY) HYPERTENSION   Status: Chronic   


Qualifiers: 


   Hypertension type: essential hypertension   Qualified Code(s): I10 - Esse

ntial (primary) hypertension   





(10) Tobacco abuse


Code(s): Z72.0 - TOBACCO USE   Status: Chronic   





(11) Moderate protein-calorie malnutrition


Code(s): E44.0 - MODERATE PROTEIN-CALORIE MALNUTRITION   Status: Chronic   





- Plan


old records reviewed/req








Oncology recommendation appreciated, patient may need MRI and subsequently 

radiation therapy to metastatic site


General surgery recommendation appreciated, tomorrow decision will be made eithe

r cholecystectomy versus cholecystostomy tube placement


Medication reviewed and continue to provide symptomatic supportive care

## 2020-11-22 NOTE — MRI
MRI lumbar spine without and with gadolinium contrast



HISTORY: L3 mass. Back pain.



FINDINGS: Vertebral body heights and alignment are maintained. Conus medullaris has normal appearance
. There is mild osteophytosis without significant degenerative central canal or foraminal stenoses.

Small renal cysts and left extrarenal pelvis are noted and better detailed on CT exams.



Centered within the right side of the L3 vertebra is a slightly expansile destructive soft tissue den
sity mass that is 2.1 cm craniocaudal length by 2.5 cm width by 2.5 cm depth. It shows

heterogeneous contrast enhancement and extends posteriorly into the right anterior aspect of the cent
ral spinal canal, where it slightly effaces the ventral aspect of the thecal sac and contacts but

does not significantly compress the right L3 nerve root.



Centered within the posterior aspect of the L4 superior endplate is a similar appearing masslike area
 of signal abnormality with heterogeneous enhancement. It is slightly irregular but predominantly

round, 1.2 cm in each dimension. It does not extend into the central canal. Subtle discontinuity of t
he superior endplate at the anterior margin of the mass is a worrisome finding.



  



IMPRESSION :

Large destructive mass of the L3 vertebral body most likely represents a metastatic lesion. There is 
slight effacement of the thecal sac within the central canal without significant nerve root

compression.



Lesion at the posterior aspect of the L4 superior endplate likely represents another metastatic focus
.



Reported By: MAKAYLA Tompkins 

Electronically Signed:  11/22/2020 11:15 AM

## 2020-11-23 RX ADMIN — HYDROCODONE BITARTRATE AND ACETAMINOPHEN PRN TAB: 5; 325 TABLET ORAL at 21:24

## 2020-11-23 RX ADMIN — FAMOTIDINE SCH MG: 10 INJECTION, SOLUTION INTRAVENOUS at 21:25

## 2020-11-23 RX ADMIN — FAMOTIDINE SCH MG: 10 INJECTION, SOLUTION INTRAVENOUS at 08:48

## 2020-11-23 NOTE — PRG
DATE OF SERVICE:  



SUBJECTIVE:  The patient is currently on the Medicine floor.  She is the patient we

are seeing in consultation for right upper quadrant pain.  There was concern for

cholecystitis.  She denies nausea, vomiting.  She states that she is having some

constipation as she has not had a bowel movement for 4-5 days. 



OBJECTIVE:  VITAL SIGNS:  Patient remains afebrile.  Maximum temperature during this

hospital stay has been 98.4.  Heart rate 73, blood pressure 150/94, respirations 16,

and oxygen saturation 100% on room air. 

GENERAL:  The patient is resting comfortably in bed.  She is awake, alert,

conversant, very talkative.  I did see her in conjunction with Oncology,

specifically nurse practitioner, Aruna Morrissey. 

GENERAL:  The patient's abdomen is soft flat with tenderness along the right lower

and right upper quadrant.  No gross peritoneal signs.  The patient did not allow me

to examine her for Hairston sign as she said that it hurt too much.  She did have

active bowel sounds.  There are no radiographs reviewed this morning. 



ASSESSMENT:  Right-sided abdominal pain, concern for cholecystitis.  There are no

radiographs reviewed this morning. 



PLAN:  The patient will be to make the patient n.p.o. after midnight.  She will be

treated for her constipation this evening.  Tomorrow, we will re-evaluate her for

possible laparoscopic cholecystectomy versus placement of cholecystostomy tube.  We

will repeat her LFTs in the morning and discuss the plan at that time.  This should

not interfere with her cancer treatments that are planned. 







Job ID:  [id number]

## 2020-11-23 NOTE — PDOC.MOPN
Interval History: 





pain ok, complains of constipation. RUQ abdominal tenderness





- Vital Signs


Vital Signs: 


                             Vital Signs (12 hours)











  Temp Pulse Resp BP Pulse Ox


 


 11/23/20 15:23  97.3 F L  84  16  146/90 H  98


 


 11/23/20 11:36  98.1 F  73  16  150/94 H  100


 


 11/23/20 08:00      100


 


 11/23/20 07:06  98.3 F  76  15  155/92 H  98








                                     Weight











Admit Weight                   94 lb


 


Weight                         94 lb

















- Physical Exam


General: Alert, Oriented x3, No acute distress


HEENT: Atraumatic, PERRLA, EOMI, Mucous membr. moist/pink


Lungs: Clear to auscultation, Normal air movement


Cardiovascular: Regular rate, Normal S1, Normal S2, No murmurs, Gallops, Rubs


Abdomen: Other


Neurological: Normal gait, Normal speech, Strength at 5/5 X4 ext, Normal tone, 

Sensation intact, Cranial nerves 3-12 NL, Reflexes 2+





- Labs


Result Diagrams: 


                                 11/22/20 06:24





                                 11/22/20 06:24


Status: lab reviewed by me





A/P





- Problem


(1) Back pain


Current Visit: Yes   Code(s): M54.9 - DORSALGIA, UNSPECIFIED   Status: Acute   





(2) Elevated LFTs


Current Visit: Yes   Code(s): R79.89 - OTHER SPECIFIED ABNORMAL FINDINGS OF 

BLOOD CHEMISTRY   Status: Acute   





(3) Abdominal pain


Current Visit: Yes   Code(s): R10.9 - UNSPECIFIED ABDOMINAL PAIN   Status: 

Chronic   


Qualifiers: 


   Abdominal location: right upper quadrant   Qualified Code(s): R10.11 - Right 

upper quadrant pain   





(4) Metastatic colon cancer in female


Current Visit: Yes   Code(s): C18.9 - MALIGNANT NEOPLASM OF COLON, UNSPECIFIED  

Status: Chronic   





- Plan


Plan: 





1. Simulation today for XRT, dose tomorrow


2. Surgery to decide if any intervention needed for gallbladder


3. patient will need chemotherapy in the outpatient setting


4. laxative for constipation

## 2020-11-23 NOTE — PDOC.HOSPP
- Subjective


Encounter Date: 11/23/20


Encounter Time: 08:15


Subjective: 


Patient seen and examined. No new complaints. No overnight events





- Objective


Vital Signs & Weight: 


                             Vital Signs (12 hours)











  Temp Pulse Resp BP Pulse Ox


 


 11/23/20 07:06  98.3 F  76  15  155/92 H  98








                                     Weight











Admit Weight                   94 lb


 


Weight                         94 lb














I&O: 


                                        











 11/22/20 11/23/20 11/24/20





 06:59 06:59 06:59


 


Intake Total 1050 1480 


 


Balance 1050 1480 











Result Diagrams: 


                                 11/22/20 06:24





                                 11/22/20 06:24





Hospitalist ROS





- Review of Systems


ENT: denies: ear pain, ear discharge, nose pain, nose discharge, nose 

congestion, mouth pain, mouth swelling, throat pain, throat swelling, other


Respiratory: denies: cough, dry, shortness of breath, hemoptysis, SOB with 

excertion, pleuritic pain, sputum, wheezing, other


Cardiovascular: denies: chest pain, palpitations, orthopnea, paroxysmal noc. 

dyspnea, edema, light headedness, other


Gastrointestinal: reports: abdominal pain.  denies: nausea, vomiting, diarrhea, 

constipation, melena, hematochezia, other


Genitourinary: denies: dysuria, frequency, incontinence, hematuria, retention, 

other


Musculoskeletal: reports: back pain.  denies: neck pain, shoulder pain, arm 

pain, hand pain, leg pain, foot pain, other





- Medication


Medications: 


Active Medications











Generic Name Dose Route Start Last Admin





  Trade Name Freq  PRN Reason Stop Dose Admin


 


Alprazolam  0.5 mg  11/21/20 08:25  11/22/20 21:37





  Alprazolam 0.5 Mg Tab  PO   0.5 mg





  Q6H PRN   Administration





  Anxiety  


 


Famotidine  20 mg  11/20/20 21:00  11/23/20 08:48





  Famotidine/Pf 20 Mg/2ml Vial  SLOW IVP   20 mg





  Q12HR OWEN   Administration


 


Gabapentin  300 mg  11/21/20 09:00  11/23/20 08:47





  Gabapentin 300 Mg Cap  PO   300 mg





  BID OWEN   Administration


 


Polyethylene Glycol  17 gm  11/21/20 09:00  11/23/20 08:48





  Polyethylene Glycol 3350 17 Gm Packet  PO   17 gm





  BID OWEN   Administration


 


Tramadol HCl  50 mg  11/21/20 02:26  11/22/20 21:38





  Tramadol Hcl 50 Mg Tab  PO   50 mg





  Q4H PRN   Administration





  Moderate Pain (4-6)  














- Exam


General Appearance: NAD, awake alert


Eye: PERRL, anicteric sclera


ENT: normocephalic atraumatic, no oropharyngeal lesions


Neck: supple, symmetric, no JVD, no thyromegaly


Heart: RRR, no murmur, no gallops, no rubs


Respiratory: CTAB, no wheezes, no rales, no ronchi


Gastrointestinal: soft, non-tender, non-distended, normal bowel sounds


Extremities: no cyanosis, no clubbing, no edema


Skin: normal turgor, no lesions


Neurological: no focal deficits


Musculoskeletal: normal tone, normal strength


Psychiatric: normal affect, normal behavior





Hosp A/P


(1) Acute cholecystitis without calculus


Code(s): K81.0 - ACUTE CHOLECYSTITIS   Status: Acute   





(2) Back pain


Code(s): M54.9 - DORSALGIA, UNSPECIFIED   Status: Acute   





(3) Elevated LFTs


Code(s): R79.89 - OTHER SPECIFIED ABNORMAL FINDINGS OF BLOOD CHEMISTRY   Status:

Acute   





(4) Abdominal pain


Code(s): R10.9 - UNSPECIFIED ABDOMINAL PAIN   Status: Chronic   


Qualifiers: 


   Abdominal location: right upper quadrant   Qualified Code(s): R10.11 - Right 

upper quadrant pain   





(5) Bipolar disorder


Code(s): F31.9 - BIPOLAR DISORDER, UNSPECIFIED   Status: Chronic   





(6) Metastatic colon cancer in female


Code(s): C18.9 - MALIGNANT NEOPLASM OF COLON, UNSPECIFIED   Status: Chronic   





(7) Schizophrenia


Code(s): F20.9 - SCHIZOPHRENIA, UNSPECIFIED   Status: Chronic   





(8) COPD (chronic obstructive pulmonary disease)


Status: Chronic   


Qualifiers: 


   COPD type: chronic bronchitis 





(9) HTN (hypertension)


Code(s): I10 - ESSENTIAL (PRIMARY) HYPERTENSION   Status: Chronic   


Qualifiers: 


   Hypertension type: essential hypertension   Qualified Code(s): I10 - Ess

ential (primary) hypertension   





(10) Tobacco abuse


Code(s): Z72.0 - TOBACCO USE   Status: Chronic   





(11) Moderate protein-calorie malnutrition


Code(s): E44.0 - MODERATE PROTEIN-CALORIE MALNUTRITION   Status: Chronic   





- Plan


old records reviewed/req








Radiation oncology consulted,


General surgery to decide about cholecystectomy versus cholecystostomy tube


Medication reviewed and continue to provide symptomatic and supportive care

## 2020-11-23 NOTE — PDOC.CONS
- Consultation


Encounter Date: 11/23/20


Encounter Time: 09:41


INITIAL CONSULTATION - RADIATION ONCOLOGY





IDENTIFICATION: 56 yo F with stage IV metastatic adenocarcinoma of colon, s/p 12

cycles FOLFOX+Avastin 10/2019 - 4/2020, on chemo holiday since April, now with 

progression and painful lesion in lumbar spine.





HISTORY OF PRESENT ILLNESS: Ms. Rojas has a longtanding cancer history, 

initially diagnosed in late 2019, when she had a supraclavicular mass in her 

neck that was biopsied, consistent with colorectal cancer.  Upon her initial 

diagnosis, CT scan showed left supraclavicular, mediastinum, upper abdomen, and 

retroperitoneal mesenteric regions as well as a suspicious mass near the cecum. 

Confirmed hyperavidity on PET/CT. She underwent 12 cycles of FOLFOX and Avastin,

started 10/2019, completed 4/2020.  In April 2020, she opted to go on chemo

therapy holiday rather than maintenance chemo. She has not been on any systemic 

therapy since that time and has been monitored via surveillance CT scans.  She 

also underwent colonoscopy on 10/22/2020 without evidence of malignancy upon 

biopsies.





More recently, she started to have pain in abodmen, chest, and back. Last week 

was admitted for chest and abdominal pain, where CT abd/pelvis and gallbladder 

ultrasound showed gallbladder thickening and mild elevation LFTs. This was 

attributed to carcinomatosis, and she was discharged on 11/15/2020 once she 

tolerated PO diet and nausea resolved. She is currently admitted for abodmen and

back pain. Repeat CT abd/pelvis confirmed similar findings with diffuse 

intrahepatic biliary ductal dilation, pancolitis, duodenitis, diffuse 

mesentereic adenopathy and edema, and showed new enhancement of gallbladder as 

well as new L3 vertebral body osteolytic metastasis. She then had MRI spine that

confirmed L3 met not causing nerve compression but slightly effacing thecal sac 

as well as a L4 lesion. Radiation oncology was consulted.  Upon our 

conversation, she reports the back pain started on 11/12/2020 and radiates down 

her right posterior leg. Narcotics help the pain. She has not been nauseous this

morning and has not taken antiemetics. She actually complains of weakness in her

contralateral left leg, as well as weakness in her left arm that has resolved 

over past week.  She denies bowel or bladder incontinence, saddle anesthesia, 

right leg weakness or numbness, and she is currently able to ambulate short 

distances without assistance.





PAST MEDICAL HISTORY:     HTN, Metastatic colon cancer; Asthma; Cholecystitis; 

COPD





PAST SURGICAL HISTORY:     Hysterectomy (89)





ALLERGIES:   NKDA





FAMILY HISTORY:  Mother with bone cancer





SOCIAL HISTORY:  Everyday smoker. Alcohol use social. Marijuana use. Lives with 

spouse. 





PREVIOUS RADIATION:   None prior





REVIEW OF SYSTEMS: 10-point ROS negative except as per HPI and nursing notes.





PHYSICAL EXAM:     


GENERAL: KPS 80. Thin alert female sitting up in hospital bed in NAD.


HEENT: Normocephalic, atraumatic. Symmetric.


LYMPHATICS: No cervical or supraclavicular adenopathy palpable bilaterally.


CARDIOVASCULAR: Regular rate and rhythm. No murmurs, rubs, or gallops.


PULMONARY: Clear to auscultation bilaterally. No crackles, wheezes, or rhonchi.


BACK: Tenderness to palpation of entire lumbosacral spine and bilateral SI 

joints (right > left)


ABDOMEN: Tenderness throughout, most prominent RUQ. 


MUSCULOSKELETAL: No peripheral edema. No gross joint deformities.


NEUROLOGIC: Cranial nerves grossly intact. Strength 4/5 in bilateral upper 

extremities and 5/5 in bilateral lower extremities, symmetric. Sensation to soft

touch intact and symmetric with exception of small region on LEFT posterior 

lumbar region that is numb with intact sensation throughout bilateral lower 

extremities. Stands without assistance.





LABS: Reviewed CBC and CMP from 11/22/2020. Alk Phos: 537. Tbili: 0.5. GFR >90. 

WBC 6.4. Hgb 14.9








IMAGING: 


PET/CT, 9/27/2019:


Hypermetabolic adenopathy in left periclavicular, mediastinum, mesenteric, 

retroperitoneal, left external iliac, and right hemicolon Avidity.





CT Brain, 11/10/2020:


No acute intracranial process





Abd/Pelvis CT, 11/14/2020:


Intrahepatic biliary dilation, renal cyst, bilateral adrenal hyperplasia, 

duodenitis, bone lesion at L3.





Abd/Pelvis CT, 11/20/2020:


Osteolytic bone lesion at L3 protruding into spinal canal.  Intrahepatic ductal 

dilation.  Contraction and enhancement of gallbladder.  Diffuse thickening of 

colon, multiple mesenteric lymph nodes, bilateral adrenal hyperplasia, 

mesenteric edema, duodenitis.





Lumbar Spine MRI, 11/22/2020:


Large destructive mass at L3 with slight effacement of thecal sac without nerve 

root compression.  Lesion at posterior aspect of L4 superior endplate may 

represent an additional metastatic focus.


 





PATHOLOGY: 


9/11/2019:


Left supraclavicular excision: Metastatic carcinoma consistent with colorectal 

origin





10/22/2020:


Duodenum and colon biopsies: no malignancy


 





ASSESMENT/PLAN: 56 yo F with stage IV metastatic adenocarcinoma of colon, s/p 12

cycles FOLFOX+Avastin 10/2019 - 4/2020, on chemo holiday since April, now with 

symptomatic lumbar metastases.  In the widely metastatic setting, radiation is 

indicated to locations that are symptomatic or likely to cause impending 

problems She has a destructive mass in her lumbar spine that has been 

progressively enlarging and becoming more painful. Palliative radiation has a 

high response rate for improving pain and local control to prevent this lesion 

from compromising future neurologic function. 





Regarding radiation, there is no evidence of nerve compression and there is not 

a large soft tissue component, so treatment within a single day is an option (8 

Gy in 1 fx). This regimen, compared to treating her over 1-2 weeks, has a slight

ly higher chance for requiring retreatment, but it has similar rates pain 

improvement. Plan to treat both the L3 and L4 vertebral bodies (disease evident 

in both vertebrae on MRI) and will simulate her today and treat her tomorrow. We

discussed toxicities of radiation, which are minimal but may include: posterior 

skin irritation, fatigue, and loose stool within the week after treatment.  She 

agreed to treatment and will have her sign consent in our clinic later today.





60 minutes were spent with this patient with greater than 50% of that time spent

on counseling and coordination of care.





Recap:


- Recommend radiation for painful lumbar metastases targeting L3 and L4 to 8 Gy 

in a single fraction


   - Simulate patient today for radiation planning


   - Anticipate treatment tomorrow, 11/24/2020

## 2020-11-24 LAB
ALBUMIN SERPL BCG-MCNC: 3.6 G/DL (ref 3.5–5)
ALP SERPL-CCNC: 686 U/L (ref 40–110)
ALT SERPL W P-5'-P-CCNC: 142 U/L (ref 8–55)
AST SERPL-CCNC: 123 U/L (ref 5–34)
BILIRUB DIRECT SERPL-MCNC: 0.2 MG/DL (ref 0.1–0.3)
BILIRUB SERPL-MCNC: 0.5 MG/DL (ref 0.2–1.2)

## 2020-11-24 PROCEDURE — DPYC7ZZ CONTACT RADIATION OF OTHER BONE: ICD-10-PCS | Performed by: STUDENT IN AN ORGANIZED HEALTH CARE EDUCATION/TRAINING PROGRAM

## 2020-11-24 RX ADMIN — HYDROCODONE BITARTRATE AND ACETAMINOPHEN PRN TAB: 5; 325 TABLET ORAL at 20:44

## 2020-11-24 RX ADMIN — FAMOTIDINE SCH MG: 10 INJECTION, SOLUTION INTRAVENOUS at 20:02

## 2020-11-24 RX ADMIN — FAMOTIDINE SCH MG: 10 INJECTION, SOLUTION INTRAVENOUS at 07:58

## 2020-11-24 RX ADMIN — HYDROCODONE BITARTRATE AND ACETAMINOPHEN PRN TAB: 5; 325 TABLET ORAL at 15:14

## 2020-11-24 RX ADMIN — HYDROCODONE BITARTRATE AND ACETAMINOPHEN PRN TAB: 5; 325 TABLET ORAL at 14:04

## 2020-11-24 NOTE — PDOC.BPN
- Brief Progress Note


Encounter Date: 11/24/20


Encounter Time: 14:14


RADIATION END OF TREATMENT NOTE





Identification: 55-year-old female with stage IV metastatic adenocarcinoma of 

the colon, s/p 12 cycles of FOLFOX plus Avastin 10/20/2019 through 4/20/2020, on

chemo holiday since April, now with progression and painful lesion in lumbar 

spine. She has completed palliative radiotherapy to L3-L4 as detailed below.





Radiation Description: Ms. Rojas underwent CT simulation in supine position 

with custom mold for immobilization. Marks were made on her abdomen to help 

align her in the same treatment position. The gross disease in L3 and L4 

posterior vertebral bodies was contoured on the CT scan. She was planned for 

treatment with a two-field technique using opposed AP/PA fields targeting the L3

and L4 vertebrae.  The total delivered dose was 8 Gy in 1 fraction, which 

extended anteriorly to cover these vertebral bodies. The bowel anterior to these

vertebrae received lower doses. KV images were taken prior to treatment to e

nsure accurate setup. 





Treatment Tolerance: The patient tolerated treatment well without acute 

toxicity. 





Follow Up: FU with radiation oncology in 8 weeks. She will FU with medical 

oncology for outpatient systemic therapy. Pending surgical decision about 

gallbladder.

## 2020-11-24 NOTE — PDOC.HOSPP
- Subjective


Encounter Date: 11/24/20


Encounter Time: 10:00


Subjective: 


Patient seen and examined. No new complaints. No overnight events





- Objective


Vital Signs & Weight: 


                             Vital Signs (12 hours)











  Temp Pulse Resp BP BP Pulse Ox


 


 11/24/20 11:26  98.5 F  88  20   131/92 H  100


 


 11/24/20 08:00       100


 


 11/24/20 07:58   75   158/99 H  


 


 11/24/20 07:21  98.1 F  75  18   158/99 H  100








                                     Weight











Admit Weight                   94 lb


 


Weight                         94 lb














I&O: 


                                        











 11/23/20 11/24/20 11/25/20





 06:59 06:59 06:59


 


Intake Total 1480 490 


 


Balance 1480 490 











Result Diagrams: 


                                 11/22/20 06:24





                                 11/22/20 06:24





Hospitalist ROS





- Review of Systems


ENT: denies: ear pain, ear discharge, nose pain, nose discharge, nose 

congestion, mouth pain, mouth swelling, throat pain, throat swelling, other


Respiratory: denies: cough, dry, shortness of breath, hemoptysis, SOB with 

excertion, pleuritic pain, sputum, wheezing, other


Cardiovascular: denies: chest pain, palpitations, orthopnea, paroxysmal noc. 

dyspnea, edema, light headedness, other


Gastrointestinal: denies: nausea, vomiting, abdominal pain, diarrhea, 

constipation, melena, hematochezia, other


Genitourinary: denies: dysuria, frequency, incontinence, hematuria, retention, 

other


Musculoskeletal: denies: neck pain, shoulder pain, arm pain, back pain, hand 

pain, leg pain, foot pain, other





- Medication


Medications: 


Active Medications











Generic Name Dose Route Start Last Admin





  Trade Name Freq  PRN Reason Stop Dose Admin


 


Hydrocodone Bitart/Acetaminophen  1 tab  11/21/20 08:26  11/23/20 21:24





  Hydrocodone/Acetaminophen 5/325 Mg Tablet  PO   1 tab





  Q4H PRN   Administration





  Moderate Pain (4-6)  


 


Alprazolam  0.5 mg  11/21/20 08:25  11/23/20 21:43





  Alprazolam 0.5 Mg Tab  PO   0.5 mg





  Q6H PRN   Administration





  Anxiety  


 


Amlodipine Besylate  10 mg  11/24/20 09:00  11/24/20 07:58





  Amlodipine 10 Mg Tab  PO   10 mg





  DAILY OWEN   Administration


 


Famotidine  20 mg  11/20/20 21:00  11/24/20 07:58





  Famotidine/Pf 20 Mg/2ml Vial  SLOW IVP   20 mg





  Q12HR OWEN   Administration


 


Gabapentin  300 mg  11/21/20 09:00  11/24/20 07:58





  Gabapentin 300 Mg Cap  PO   300 mg





  BID OWEN   Administration


 


Polyethylene Glycol  17 gm  11/21/20 09:00  11/24/20 07:59





  Polyethylene Glycol 3350 17 Gm Packet  PO   Not Given





  BID OWEN  


 


Tramadol HCl  50 mg  11/21/20 02:26  11/22/20 21:38





  Tramadol Hcl 50 Mg Tab  PO   50 mg





  Q4H PRN   Administration





  Moderate Pain (4-6)  














- Exam


General Appearance: NAD, awake alert


Eye: PERRL, anicteric sclera


ENT: normocephalic atraumatic, no oropharyngeal lesions


Neck: supple, symmetric, no JVD, no thyromegaly


Heart: RRR, no murmur, no gallops, no rubs


Respiratory: no wheezes, no rales, no ronchi


Gastrointestinal: soft, non-tender, non-distended, normal bowel sounds


Extremities: no cyanosis, no clubbing, no edema


Skin: normal turgor, no lesions


Neurological: no focal deficits


Musculoskeletal: normal tone, normal strength


Psychiatric: normal affect, normal behavior





Hosp A/P


(1) Acute cholecystitis without calculus


Code(s): K81.0 - ACUTE CHOLECYSTITIS   Status: Acute   





(2) Back pain


Code(s): M54.9 - DORSALGIA, UNSPECIFIED   Status: Acute   


Plan: 








Due to metastatic lesion








(3) Elevated LFTs


Code(s): R79.89 - OTHER SPECIFIED ABNORMAL FINDINGS OF BLOOD CHEMISTRY   Status:

Acute   





(4) Abdominal pain


Code(s): R10.9 - UNSPECIFIED ABDOMINAL PAIN   Status: Chronic   


Qualifiers: 


   Abdominal location: right upper quadrant   Qualified Code(s): R10.11 - Right 

upper quadrant pain   





(5) Bipolar disorder


Code(s): F31.9 - BIPOLAR DISORDER, UNSPECIFIED   Status: Chronic   





(6) Metastatic colon cancer in female


Code(s): C18.9 - MALIGNANT NEOPLASM OF COLON, UNSPECIFIED   Status: Chronic   





(7) Schizophrenia


Code(s): F20.9 - SCHIZOPHRENIA, UNSPECIFIED   Status: Chronic   





(8) COPD (chronic obstructive pulmonary disease)


Status: Chronic   


Qualifiers: 


   COPD type: chronic bronchitis 





(9) HTN (hypertension)


Code(s): I10 - ESSENTIAL (PRIMARY) HYPERTENSION   Status: Chronic   


Qualifiers: 


   Hypertension type: essential hypertension   Qualified Code(s): I10 - 

Essential (primary) hypertension   





(10) Tobacco abuse


Code(s): Z72.0 - TOBACCO USE   Status: Chronic   





(11) Moderate protein-calorie malnutrition


Code(s): E44.0 - MODERATE PROTEIN-CALORIE MALNUTRITION   Status: Chronic   





- Plan


old records reviewed/req, plan discussed w/ family








S/p radiation marking, continue radiation therapy as per radiation oncology for 

back pain


General surgery to decide about a laparoscopic cholecystectomy, patient prefers 

that option,


Discussed with the patient and family member bedside


Symptomatic treatment


Discharge pending above

## 2020-11-25 RX ADMIN — HYDROCODONE BITARTRATE AND ACETAMINOPHEN PRN TAB: 5; 325 TABLET ORAL at 20:06

## 2020-11-25 RX ADMIN — HYDROCODONE BITARTRATE AND ACETAMINOPHEN PRN TAB: 5; 325 TABLET ORAL at 01:52

## 2020-11-25 RX ADMIN — HYDROCODONE BITARTRATE AND ACETAMINOPHEN PRN TAB: 5; 325 TABLET ORAL at 09:26

## 2020-11-25 RX ADMIN — FAMOTIDINE SCH MG: 10 INJECTION, SOLUTION INTRAVENOUS at 09:20

## 2020-11-25 RX ADMIN — FAMOTIDINE SCH MG: 10 INJECTION, SOLUTION INTRAVENOUS at 20:05

## 2020-11-25 NOTE — PDOC.HOSPP
- Subjective


Encounter Date: 11/25/20


Encounter Time: 09:30


Subjective: 


Patient seen for follow-up regarding acute cholecystitis.  She denies chest pain

or shortness of breath.





- Objective


Vital Signs & Weight: 


                             Vital Signs (12 hours)











  Temp Pulse Resp BP Pulse Ox


 


 11/25/20 07:14  97.5 F L  71  16  133/87  100








                                     Weight











Admit Weight                   94 lb


 


Weight                         94 lb














I&O: 


                                        











 11/24/20 11/25/20 11/26/20





 06:59 06:59 06:59


 


Intake Total 490 510 


 


Balance 490 510 











Result Diagrams: 


                                 11/22/20 06:24





                                 11/22/20 06:24


Additional Labs: 


Labs and MAR reviewed by me





Hospitalist ROS





- Review of Systems


Cardiovascular: denies: chest pain, palpitations, orthopnea, paroxysmal noc. 

dyspnea, edema, light headedness


Genitourinary: denies: dysuria, frequency, incontinence, hematuria, retention





- Medication


Medications: 


Active Medications











Generic Name Dose Route Start Last Admin





  Trade Name Freq  PRN Reason Stop Dose Admin


 


Hydrocodone Bitart/Acetaminophen  1 tab  11/21/20 08:26  11/25/20 09:26





  Hydrocodone/Acetaminophen 5/325 Mg Tablet  PO   1 tab





  Q4H PRN   Administration





  Moderate Pain (4-6)  


 


Alprazolam  0.5 mg  11/21/20 08:25  11/25/20 13:40





  Alprazolam 0.5 Mg Tab  PO   0.5 mg





  Q6H PRN   Administration





  Anxiety  


 


Amlodipine Besylate  10 mg  11/24/20 09:00  11/25/20 09:26





  Amlodipine 10 Mg Tab  PO   10 mg





  DAILY OWEN   Administration


 


Famotidine  20 mg  11/20/20 21:00  11/25/20 09:20





  Famotidine/Pf 20 Mg/2ml Vial  SLOW IVP   20 mg





  Q12HR OWEN   Administration


 


Gabapentin  300 mg  11/21/20 09:00  11/25/20 09:27





  Gabapentin 300 Mg Cap  PO   300 mg





  BID OWEN   Administration


 


Metoclopramide HCl  5 mg  11/21/20 08:27  11/24/20 20:02





  Metoclopramide Hcl 10 Mg/2 Ml Vial  IVP   5 mg





  Q6H PRN   Administration





  Nausea/Vomiting  


 


Ondansetron HCl  4 mg  11/24/20 19:04  11/24/20 20:02





  Ondansetron Pf 4 Mg/2 Ml Vial  IVP   4 mg





  Q6H PRN   Administration





  Nausea/Vomiting  


 


Polyethylene Glycol  17 gm  11/21/20 09:00  11/25/20 09:20





  Polyethylene Glycol 3350 17 Gm Packet  PO   Not Given





  BID OWEN  


 


Tramadol HCl  50 mg  11/21/20 02:26  11/22/20 21:38





  Tramadol Hcl 50 Mg Tab  PO   50 mg





  Q4H PRN   Administration





  Moderate Pain (4-6)  














- Exam


General Appearance: awake alert


Eye: anicteric sclera


ENT: moist mucosa


Neck: supple


Heart: RRR


Respiratory: CTAB


Gastrointestinal: soft, non-tender


Skin: no rashes


Psychiatric: normal affect, normal behavior





Hosp A/P





- Plan





-Assessment





(1) Acute cholecystitis without calculus


Code(s): K81.0 - ACUTE CHOLECYSTITIS   Status: Acute   





(2) Back pain


Code(s): M54.9 - DORSALGIA, UNSPECIFIED   Status: Acute   


Plan: 





Due to metastatic lesion








(3) Elevated LFTs


Code(s): R79.89 - OTHER SPECIFIED ABNORMAL FINDINGS OF BLOOD CHEMISTRY   Status:

Acute   





(4) Abdominal pain


Code(s): R10.9 - UNSPECIFIED ABDOMINAL PAIN   Status: Chronic   


Qualifiers: 


   Abdominal location: right upper quadrant   Qualified Code(s): R10.11 - Right 

upper quadrant pain   





(5) Bipolar disorder


Code(s): F31.9 - BIPOLAR DISORDER, UNSPECIFIED   Status: Chronic   





(6) Metastatic colon cancer in female


Code(s): C18.9 - MALIGNANT NEOPLASM OF COLON, UNSPECIFIED   Status: Chronic   





(7) Schizophrenia


Code(s): F20.9 - SCHIZOPHRENIA, UNSPECIFIED   Status: Chronic   





(8) COPD (chronic obstructive pulmonary disease)


Status: Chronic   


Qualifiers: 


   COPD type: chronic bronchitis 





(9) HTN (hypertension)


Code(s): I10 - ESSENTIAL (PRIMARY) HYPERTENSION   Status: Chronic   


Qualifiers: 


   Hypertension type: essential hypertension   Qualified Code(s): I10 - 

Essential (primary) hypertension   





(10) Tobacco abuse


Code(s): Z72.0 - TOBACCO USE   Status: Chronic   





(11) Moderate protein-calorie malnutrition


Code(s): E44.0 - MODERATE PROTEIN-CALORIE MALNUTRITION   Status: Chronic   





- Plan


Radiation therapy for back pain.


Laparoscopic cholecystectomy versus cholecystostomy tube.


General surgery, medical oncology and radiation oncology following.

## 2020-11-25 NOTE — PDOC.MOPN
Interval History: 





thirsty this am, npo for procedure. back pain the same.





- Vital Signs


Vital Signs: 


                             Vital Signs (12 hours)











  Temp Pulse Resp BP Pulse Ox


 


 11/25/20 07:14  97.5 F L  71  16  133/87  100


 


 11/25/20 04:44  98.5 F  84  18  164/110 H  96








                                     Weight











Admit Weight                   94 lb


 


Weight                         94 lb

















- Physical Exam


General: Alert, Oriented x3, No acute distress


HEENT: Atraumatic, PERRLA, EOMI, Mucous membr. moist/pink


Lungs: Clear to auscultation, Normal air movement


Cardiovascular: Regular rate, Normal S1, Normal S2, No murmurs, Gallops, Rubs


Abdomen: Other


Neurological: Normal gait, Normal speech, Strength at 5/5 X4 ext, Normal tone, 

Sensation intact, Cranial nerves 3-12 NL, Reflexes 2+


Psych/Mental Status: Mental status NL, Mood NL





- Labs


Result Diagrams: 


                                 11/22/20 06:24





                                 11/22/20 06:24


Status: lab reviewed by me





A/P





- Problem


(1) Back pain


Current Visit: Yes   Code(s): M54.9 - DORSALGIA, UNSPECIFIED   Status: Acute   





(2) Elevated LFTs


Current Visit: Yes   Code(s): R79.89 - OTHER SPECIFIED ABNORMAL FINDINGS OF 

BLOOD CHEMISTRY   Status: Acute   





(3) Abdominal pain


Current Visit: Yes   Code(s): R10.9 - UNSPECIFIED ABDOMINAL PAIN   Status: 

Chronic   


Qualifiers: 


   Abdominal location: right upper quadrant   Qualified Code(s): R10.11 - Right 

upper quadrant pain   





(4) Metastatic colon cancer in female


Current Visit: Yes   Code(s): C18.9 - MALIGNANT NEOPLASM OF COLON, UNSPECIFIED  

Status: Chronic   





- Plan


Plan: 





1. xrt completed


2. plan chemo in outpatient setting, appt next week


3. GB drain placement today


4. home when stable


5. will sign off, call if needed

## 2020-11-25 NOTE — ULT
ULTRASOUND ABDOMEN LIMITED:

(RIGHT UPPER QUADRANT)



DATE:

11/25/2020



HISTORY:

55-year-old female with abnormal gallbladder. Ultrasound performed to assess whether the gallbladder 
is now dilated enough for percutaneous cholecystostomy.



COMPARISON:

Ultrasound of 11/20/2020



FINDINGS:

Gallbladder is not excessively distended. The liver is unchanged since 11/20/2020. There is sludge. T
here is mural thickening and mural edema as previously demonstrated. No gallstones identified. No

pericholecystic fluid collection identified. Common duct 5 mm. No hydronephrosis of right kidney. Hep
atic echogenicity within normal limits. No interval change overall. Nonspecific sonographic

appearance of pancreas.



IMPRESSION:



1. Abnormal gallbladder: Mural thickening and mural edema. Sludge.

2. Gallbladder is not distended enough for percutaneous cholecystostomy.

3. No interval change.



Reported By: Tan Bey 

Electronically Signed:  11/25/2020 5:52 PM

## 2020-11-26 LAB
ALBUMIN SERPL BCG-MCNC: 3.5 G/DL (ref 3.5–5)
ALP SERPL-CCNC: 625 U/L (ref 40–110)
ALT SERPL W P-5'-P-CCNC: 74 U/L (ref 8–55)
ANION GAP SERPL CALC-SCNC: 16 MMOL/L (ref 10–20)
AST SERPL-CCNC: 54 U/L (ref 5–34)
BASOPHILS # BLD AUTO: 0 THOU/UL (ref 0–0.2)
BASOPHILS NFR BLD AUTO: 0.5 % (ref 0–1)
BILIRUB SERPL-MCNC: 0.6 MG/DL (ref 0.2–1.2)
BUN SERPL-MCNC: 11 MG/DL (ref 9.8–20.1)
CALCIUM SERPL-MCNC: 9.3 MG/DL (ref 7.8–10.44)
CHLORIDE SERPL-SCNC: 100 MMOL/L (ref 98–107)
CO2 SERPL-SCNC: 23 MMOL/L (ref 22–29)
CREAT CL PREDICTED SERPL C-G-VRATE: 53 ML/MIN (ref 70–130)
EOSINOPHIL # BLD AUTO: 1.8 THOU/UL (ref 0–0.7)
EOSINOPHIL NFR BLD AUTO: 21.9 % (ref 0–10)
GLOBULIN SER CALC-MCNC: 4.1 G/DL (ref 2.4–3.5)
GLUCOSE SERPL-MCNC: 197 MG/DL (ref 70–105)
HGB BLD-MCNC: 13.9 G/DL (ref 12–16)
LYMPHOCYTES # BLD: 0.7 THOU/UL (ref 1.2–3.4)
LYMPHOCYTES NFR BLD AUTO: 8.6 % (ref 21–51)
MCH RBC QN AUTO: 32.1 PG (ref 27–31)
MCV RBC AUTO: 93.8 FL (ref 78–98)
MONOCYTES # BLD AUTO: 0.6 THOU/UL (ref 0.11–0.59)
MONOCYTES NFR BLD AUTO: 7.7 % (ref 0–10)
NEUTROPHILS # BLD AUTO: 5 THOU/UL (ref 1.4–6.5)
NEUTROPHILS NFR BLD AUTO: 61.3 % (ref 42–75)
PLATELET # BLD AUTO: 402 THOU/UL (ref 130–400)
POTASSIUM SERPL-SCNC: 3.7 MMOL/L (ref 3.5–5.1)
RBC # BLD AUTO: 4.31 MILL/UL (ref 4.2–5.4)
SODIUM SERPL-SCNC: 135 MMOL/L (ref 136–145)
WBC # BLD AUTO: 8.1 THOU/UL (ref 4.8–10.8)

## 2020-11-26 RX ADMIN — HYDROCODONE BITARTRATE AND ACETAMINOPHEN PRN TAB: 5; 325 TABLET ORAL at 13:38

## 2020-11-26 RX ADMIN — HYDROCODONE BITARTRATE AND ACETAMINOPHEN PRN TAB: 5; 325 TABLET ORAL at 23:35

## 2020-11-26 RX ADMIN — HYDROCODONE BITARTRATE AND ACETAMINOPHEN PRN TAB: 5; 325 TABLET ORAL at 04:21

## 2020-11-26 RX ADMIN — FAMOTIDINE SCH MG: 10 INJECTION, SOLUTION INTRAVENOUS at 08:29

## 2020-11-26 RX ADMIN — FAMOTIDINE SCH MG: 10 INJECTION, SOLUTION INTRAVENOUS at 20:41

## 2020-11-26 RX ADMIN — HYDROCODONE BITARTRATE AND ACETAMINOPHEN PRN TAB: 5; 325 TABLET ORAL at 19:06

## 2020-11-27 LAB
ALBUMIN SERPL BCG-MCNC: 3.5 G/DL (ref 3.5–5)
ALP SERPL-CCNC: 723 U/L (ref 40–110)
ALT SERPL W P-5'-P-CCNC: 138 U/L (ref 8–55)
ANION GAP SERPL CALC-SCNC: 12 MMOL/L (ref 10–20)
AST SERPL-CCNC: 206 U/L (ref 5–34)
BILIRUB SERPL-MCNC: 0.7 MG/DL (ref 0.2–1.2)
BUN SERPL-MCNC: 7 MG/DL (ref 9.8–20.1)
CALCIUM SERPL-MCNC: 9.5 MG/DL (ref 7.8–10.44)
CHLORIDE SERPL-SCNC: 103 MMOL/L (ref 98–107)
CO2 SERPL-SCNC: 26 MMOL/L (ref 22–29)
CREAT CL PREDICTED SERPL C-G-VRATE: 60 ML/MIN (ref 70–130)
GLOBULIN SER CALC-MCNC: 4.1 G/DL (ref 2.4–3.5)
GLUCOSE SERPL-MCNC: 99 MG/DL (ref 70–105)
HGB BLD-MCNC: 13 G/DL (ref 12–16)
MCH RBC QN AUTO: 31.5 PG (ref 27–31)
MCV RBC AUTO: 92.5 FL (ref 78–98)
MDIFF COMPLETE?: YES
PLATELET # BLD AUTO: 374 THOU/UL (ref 130–400)
POTASSIUM SERPL-SCNC: 4.2 MMOL/L (ref 3.5–5.1)
RBC # BLD AUTO: 4.12 MILL/UL (ref 4.2–5.4)
SODIUM SERPL-SCNC: 137 MMOL/L (ref 136–145)
WBC # BLD AUTO: 5.9 THOU/UL (ref 4.8–10.8)

## 2020-11-27 RX ADMIN — FAMOTIDINE SCH MG: 10 INJECTION, SOLUTION INTRAVENOUS at 22:02

## 2020-11-27 RX ADMIN — HYDROCODONE BITARTRATE AND ACETAMINOPHEN PRN TAB: 5; 325 TABLET ORAL at 18:08

## 2020-11-27 RX ADMIN — HYDROCODONE BITARTRATE AND ACETAMINOPHEN PRN TAB: 5; 325 TABLET ORAL at 13:09

## 2020-11-27 RX ADMIN — HYDROCODONE BITARTRATE AND ACETAMINOPHEN PRN TAB: 5; 325 TABLET ORAL at 06:29

## 2020-11-27 RX ADMIN — FAMOTIDINE SCH MG: 10 INJECTION, SOLUTION INTRAVENOUS at 08:22

## 2020-11-27 NOTE — PDOC.HOSPP
- Subjective


Encounter Date: 11/27/20


Encounter Time: 08:30


Subjective: 


Patient seen for follow-up regarding acute cholecystitis.  She denies chest pain

or shortness of breath.  She denies fevers or chills.





- Objective


Vital Signs & Weight: 


                             Vital Signs (12 hours)











  Temp Pulse Resp BP BP BP Pulse Ox


 


 11/27/20 16:00  97.9 F  71  20    138/82  100


 


 11/27/20 11:29  97.7 F  77  20    142/90 H  100


 


 11/27/20 08:21   80   164/91 H   


 


 11/27/20 07:08  97.4 F L  80  20   164/91 H   98








                                     Weight











Admit Weight                   94 lb


 


Weight                         94 lb














I&O: 


                                        











 11/26/20 11/27/20 11/28/20





 06:59 06:59 06:59


 


Intake Total 500 1650 


 


Output Total  2 


 


Balance 500 1648 











Result Diagrams: 


                                 11/27/20 05:54





                                 11/27/20 05:54


Additional Labs: 


I reviewed patient's labs and MAR





Hospitalist ROS





- Review of Systems


Cardiovascular: denies: chest pain, palpitations, orthopnea, paroxysmal noc. 

dyspnea, edema, light headedness


Gastrointestinal: denies: nausea, vomiting, abdominal pain, diarrhea, 

constipation, melena, hematochezia





- Medication


Medications: 


Active Medications











Generic Name Dose Route Start Last Admin





  Trade Name Freq  PRN Reason Stop Dose Admin


 


Acetaminophen  650 mg  11/20/20 18:44  11/27/20 13:10





  Acetaminophen 325 Mg Tab  PO   325 mg





  Q4H PRN   Administration





  Headache/Fever/Mild Pain (1-3)  


 


Hydrocodone Bitart/Acetaminophen  1 tab  11/21/20 08:26  11/27/20 13:09





  Hydrocodone/Acetaminophen 5/325 Mg Tablet  PO   1 tab





  Q4H PRN   Administration





  Moderate Pain (4-6)  


 


Alprazolam  0.5 mg  11/21/20 08:25  11/25/20 13:40





  Alprazolam 0.5 Mg Tab  PO   0.5 mg





  Q6H PRN   Administration





  Anxiety  


 


Amlodipine Besylate  10 mg  11/24/20 09:00  11/27/20 08:21





  Amlodipine 10 Mg Tab  PO   10 mg





  DAILY OWEN   Administration


 


Famotidine  20 mg  11/20/20 21:00  11/27/20 08:22





  Famotidine/Pf 20 Mg/2ml Vial  SLOW IVP   20 mg





  Q12HR OWEN   Administration


 


Gabapentin  300 mg  11/21/20 09:00  11/27/20 08:21





  Gabapentin 300 Mg Cap  PO   300 mg





  BID OWEN   Administration


 


Metoclopramide HCl  5 mg  11/21/20 08:27  11/24/20 20:02





  Metoclopramide Hcl 10 Mg/2 Ml Vial  IVP   5 mg





  Q6H PRN   Administration





  Nausea/Vomiting  


 


Ondansetron HCl  4 mg  11/24/20 19:04  11/24/20 20:02





  Ondansetron Pf 4 Mg/2 Ml Vial  IVP   4 mg





  Q6H PRN   Administration





  Nausea/Vomiting  


 


Polyethylene Glycol  17 gm  11/21/20 09:00  11/27/20 08:22





  Polyethylene Glycol 3350 17 Gm Packet  PO   17 gm





  BID OWEN   Administration


 


Sodium Chloride  10 ml  11/20/20 18:44  11/26/20 20:42





  Flush - Normal Saline 10 Ml Syringe  IVF   10 ml





  Q12HR PRN   Administration





  Saline Flush  


 


Tramadol HCl  50 mg  11/21/20 02:26  11/26/20 20:39





  Tramadol Hcl 50 Mg Tab  PO   50 mg





  Q4H PRN   Administration





  Moderate Pain (4-6)  














- Exam


General Appearance: awake alert


Eye: anicteric sclera


Neck: supple


Heart: RRR


Respiratory: CTAB


Gastrointestinal: soft


Gastrointestinal - other findings: Mild tenderness in the right upper quadrant


Skin: no rashes


Psychiatric: normal affect, normal behavior





Hosp A/P





- Plan





-Assessment





(1) Acute cholecystitis without calculus


Code(s): K81.0 - ACUTE CHOLECYSTITIS   Status: Acute   





(2) Back pain


Code(s): M54.9 - DORSALGIA, UNSPECIFIED   Status: Acute   


Plan: 





(3) Elevated LFTs


Code(s): R79.89 - OTHER SPECIFIED ABNORMAL FINDINGS OF BLOOD CHEMISTRY   Status:

Acute   





(4) Abdominal pain


Code(s): R10.9 - UNSPECIFIED ABDOMINAL PAIN   Status: Chronic   


Qualifiers: 


   Abdominal location: right upper quadrant   Qualified Code(s): R10.11 - Right 

upper quadrant pain   





(5) Bipolar disorder


Code(s): F31.9 - BIPOLAR DISORDER, UNSPECIFIED   Status: Chronic   





(6) Metastatic colon cancer in female


Code(s): C18.9 - MALIGNANT NEOPLASM OF COLON, UNSPECIFIED   Status: Chronic   





(7) Schizophrenia


Code(s): F20.9 - SCHIZOPHRENIA, UNSPECIFIED   Status: Chronic   





(8) COPD (chronic obstructive pulmonary disease)


Status: Chronic   


Qualifiers: 


   COPD type: chronic bronchitis 





(9) HTN (hypertension)


Code(s): I10 - ESSENTIAL (PRIMARY) HYPERTENSION   Status: Chronic   


Qualifiers: 


   Hypertension type: essential hypertension   Qualified Code(s): I10 - 

Essential (primary) hypertension   





(10) Tobacco abuse


Code(s): Z72.0 - TOBACCO USE   Status: Chronic   





(11) Moderate protein-calorie malnutrition


Code(s): E44.0 - MODERATE PROTEIN-CALORIE MALNUTRITION   Status: Chronic   





- Plan








Gallbladder not enlarged enough for cholecystostomy tube.  Cussed with general 

surgery service.  They will visit the patient later today, but no plan for 

surgery at this time.


Radiation therapy for back pain.


Likely home in 24 hours.

## 2020-11-28 LAB
ALBUMIN SERPL BCG-MCNC: 3.4 G/DL (ref 3.5–5)
ALP SERPL-CCNC: 856 U/L (ref 40–110)
ALT SERPL W P-5'-P-CCNC: 180 U/L (ref 8–55)
ANION GAP SERPL CALC-SCNC: 15 MMOL/L (ref 10–20)
AST SERPL-CCNC: 271 U/L (ref 5–34)
BILIRUB SERPL-MCNC: 1.5 MG/DL (ref 0.2–1.2)
BUN SERPL-MCNC: 6 MG/DL (ref 9.8–20.1)
CALCIUM SERPL-MCNC: 9.4 MG/DL (ref 7.8–10.44)
CHLORIDE SERPL-SCNC: 106 MMOL/L (ref 98–107)
CO2 SERPL-SCNC: 23 MMOL/L (ref 22–29)
CREAT CL PREDICTED SERPL C-G-VRATE: 68 ML/MIN (ref 70–130)
GLOBULIN SER CALC-MCNC: 3.8 G/DL (ref 2.4–3.5)
GLUCOSE SERPL-MCNC: 93 MG/DL (ref 70–105)
HGB BLD-MCNC: 13.1 G/DL (ref 12–16)
MCH RBC QN AUTO: 32.5 PG (ref 27–31)
MCV RBC AUTO: 93.3 FL (ref 78–98)
MDIFF COMPLETE?: YES
PLATELET # BLD AUTO: 362 THOU/UL (ref 130–400)
POTASSIUM SERPL-SCNC: 4.5 MMOL/L (ref 3.5–5.1)
RBC # BLD AUTO: 4.03 MILL/UL (ref 4.2–5.4)
SODIUM SERPL-SCNC: 139 MMOL/L (ref 136–145)
WBC # BLD AUTO: 5.8 THOU/UL (ref 4.8–10.8)

## 2020-11-28 PROCEDURE — 0WQF4ZZ REPAIR ABDOMINAL WALL, PERCUTANEOUS ENDOSCOPIC APPROACH: ICD-10-PCS | Performed by: SURGERY

## 2020-11-28 PROCEDURE — 0FT44ZZ RESECTION OF GALLBLADDER, PERCUTANEOUS ENDOSCOPIC APPROACH: ICD-10-PCS | Performed by: SURGERY

## 2020-11-28 RX ADMIN — HYDROCODONE BITARTRATE AND ACETAMINOPHEN PRN TAB: 5; 325 TABLET ORAL at 08:55

## 2020-11-28 RX ADMIN — FAMOTIDINE SCH: 10 INJECTION, SOLUTION INTRAVENOUS at 08:56

## 2020-11-28 RX ADMIN — FAMOTIDINE SCH MG: 10 INJECTION, SOLUTION INTRAVENOUS at 21:57

## 2020-11-28 NOTE — PDOC.HOSPP
- Subjective


Encounter Date: 11/28/20


Encounter Time: 10:30


Subjective: 


Patient seen for follow-up regarding cholecystitis.  Patient denies chest pain 

or shortness of breath.  





- Objective


Vital Signs & Weight: 


                             Vital Signs (12 hours)











  Temp Pulse Resp BP Pulse Ox


 


 11/28/20 08:55   77   


 


 11/28/20 08:00      98


 


 11/28/20 07:53  98.4 F  77  16  142/83 H  98








                                     Weight











Admit Weight                   94 lb


 


Weight                         94 lb














I&O: 


                                        











 11/27/20 11/28/20 11/29/20





 06:59 06:59 06:59


 


Intake Total 1650 840 


 


Output Total 2  


 


Balance 1648 840 











Result Diagrams: 


                                 11/28/20 06:00





                                 11/28/20 06:00


Additional Labs: 


Labs and MAR reviewed by me





Hospitalist ROS





- Review of Systems


Cardiovascular: denies: chest pain, palpitations, orthopnea, paroxysmal noc. 

dyspnea, edema, light headedness


Gastrointestinal: reports: abdominal pain.  denies: nausea, vomiting, diarrhea, 

constipation, melena, hematochezia





- Medication


Medications: 


Active Medications











Generic Name Dose Route Start Last Admin





  Trade Name Freq  PRN Reason Stop Dose Admin


 


Acetaminophen  650 mg  11/20/20 18:44  11/27/20 13:10





  Acetaminophen 325 Mg Tab  PO   325 mg





  Q4H PRN   Administration





  Headache/Fever/Mild Pain (1-3)  


 


Hydrocodone Bitart/Acetaminophen  1 tab  11/21/20 08:26  11/28/20 08:55





  Hydrocodone/Acetaminophen 5/325 Mg Tablet  PO   1 tab





  Q4H PRN   Administration





  Moderate Pain (4-6)  


 


Alprazolam  0.5 mg  11/21/20 08:25  11/28/20 17:06





  Alprazolam 0.5 Mg Tab  PO   0.5 mg





  Q6H PRN   Administration





  Anxiety  


 


Amlodipine Besylate  10 mg  11/24/20 09:00  11/28/20 08:55





  Amlodipine 10 Mg Tab  PO   10 mg





  DAILY OWEN   Administration


 


Famotidine  20 mg  11/20/20 21:00  11/28/20 08:56





  Famotidine/Pf 20 Mg/2ml Vial  SLOW IVP   Not Given





  Q12HR OWEN  


 


Gabapentin  300 mg  11/21/20 09:00  11/28/20 08:55





  Gabapentin 300 Mg Cap  PO   300 mg





  BID OWEN   Administration


 


Sodium Chloride  1,000 mls @ 70 mls/hr  11/27/20 18:00  11/28/20 08:58





  Normal Saline 0.9%  IV   1,000 mls





  .[id number] OWEN   Administration


 


Metoclopramide HCl  5 mg  11/21/20 08:27  11/24/20 20:02





  Metoclopramide Hcl 10 Mg/2 Ml Vial  IVP   5 mg





  Q6H PRN   Administration





  Nausea/Vomiting  


 


Ondansetron HCl  4 mg  11/24/20 19:04  11/24/20 20:02





  Ondansetron Pf 4 Mg/2 Ml Vial  IVP   4 mg





  Q6H PRN   Administration





  Nausea/Vomiting  


 


Polyethylene Glycol  17 gm  11/21/20 09:00  11/28/20 08:56





  Polyethylene Glycol 3350 17 Gm Packet  PO   17 gm





  BID OWEN   Administration


 


Sodium Chloride  10 ml  11/20/20 18:44  11/26/20 20:42





  Flush - Normal Saline 10 Ml Syringe  IVF   10 ml





  Q12HR PRN   Administration





  Saline Flush  


 


Tramadol HCl  50 mg  11/21/20 02:26  11/28/20 17:06





  Tramadol Hcl 50 Mg Tab  PO   50 mg





  Q4H PRN   Administration





  Moderate Pain (4-6)  














- Exam


General Appearance: awake alert


ENT: no oropharyngeal lesions


Neck: supple


Heart: RRR


Respiratory: CTAB


Gastrointestinal: soft


Extremities: no clubbing


Skin: no rashes


Musculoskeletal: normal strength


Psychiatric: normal affect





Hosp A/P





- Plan





-Assessment





(1) Acute cholecystitis without calculus


Code(s): K81.0 - ACUTE CHOLECYSTITIS   Status: Acute   





(2) Back pain


Code(s): M54.9 - DORSALGIA, UNSPECIFIED   Status: Acute   


Plan: 





(3) Elevated LFTs


Code(s): R79.89 - OTHER SPECIFIED ABNORMAL FINDINGS OF BLOOD CHEMISTRY   Status:

Acute   





(4) Abdominal pain


Code(s): R10.9 - UNSPECIFIED ABDOMINAL PAIN   Status: Chronic   


Qualifiers: 


   Abdominal location: right upper quadrant   Qualified Code(s): R10.11 - Right 

upper quadrant pain   





(5) Bipolar disorder


Code(s): F31.9 - BIPOLAR DISORDER, UNSPECIFIED   Status: Chronic   





(6) Metastatic colon cancer in female


Code(s): C18.9 - MALIGNANT NEOPLASM OF COLON, UNSPECIFIED   Status: Chronic   





(7) Schizophrenia


Code(s): F20.9 - SCHIZOPHRENIA, UNSPECIFIED   Status: Chronic   





(8) COPD (chronic obstructive pulmonary disease)


Status: Chronic   


Qualifiers: 


   COPD type: chronic bronchitis 





(9) HTN (hypertension)


Code(s): I10 - ESSENTIAL (PRIMARY) HYPERTENSION   Status: Chronic   


Qualifiers: 


   Hypertension type: essential hypertension   Qualified Code(s): I10 - 

Essential (primary) hypertension   





(10) Tobacco abuse


Code(s): Z72.0 - TOBACCO USE   Status: Chronic   





(11) Moderate protein-calorie malnutrition


Code(s): E44.0 - MODERATE PROTEIN-CALORIE MALNUTRITION   Status: Chronic   





- Plan








Gallbladder not enlarged enough for cholecystostomy tube.  


radiation therapy for back pain.


Patient is awaiting reassessment by general surgery service.

## 2020-11-28 NOTE — PDOC.OP
Operative Note





- Operative Note


Operative Note: 





DATE OF PROCEDURE: 11/28/2020





PROCEDURES:  Laparoscopic cholecystectomy and umbilical hernia repair.





SURGEON:  Dacia Tillman M.D.





PREOPERATIVE DIAGNOSIS: Acalculous cholecystitis and umbilical hernia





POSTOPERATIVE DIAGNOSIS: Acalculous cholecystitis and umbilical hernia





FINDINGS:  Distended edematous gallbladder with very dense fibrotic tissues at 

the level of the neck.  Moderate umbilical hernia with no adhesions.





HISTORY:  Patient with symptoms of biliary colic. Laparoscopic cholecystectomy 

was recommended for symptomatic relief. Preoperative LFTs were  normal and bile 

duct was normal caliber on preoperative imaging.





PROCEDURE:  After informed consent was obtained and appropriate preoperative 

antibiotics were administered, the patient was taken to the operating room and 

placed in the supine position and general endotracheal anesthesia was 

administered.  The stomach was decompressed with an OG tube and the abdomen was 

prepped and draped in standard sterile fashion.  Local anesthesia was infused to

the skin and subcutaneous tissues at the umbilical level.  A transverse skin 

incision was made.  Dissection was carried down to the fascial edges of the 

hernia and the hernia sac was dissected free of the overlying dermis.  The 

hernia sac was resected and sent to pathology.  It was somewhat thickened.  The 

fascia was approximated at intervals with figure-of-eight 0 Vicryl sutures which

were secured with hemostats but not tied down.  A 5 mm port was then placed 

through the hernia defect into the abdominal cavity and the fascia drawn 

together with the aforementioned sutures and the skin brought together around 

the trocar with a towel clamp.  Dioxide gas was insufflated to an intra-

abdominal pressure of 15 which the patient tolerated well.  Laparoscope was 

advanced into the abdominal cavity.  The abdominal cavity was carefully 

examined.  The gallbladder appeared distended and edematous but there were no 

adhesions to the fundus of the gallbladder.  Local anesthesia was infused to the

skin and subcutaneous tissues at the epigastric, right upper quadrant, and right

lateral abdominal sites and trocars were placed under direct vision of the 

laparoscope.  The fundus of the gallbladder was grasped and retracted 

superiorly.  There were some adhesions between the liver and the infundibulum of

the gallbladder which were taken down using hook electrocautery.  The 

infundibulum was grasped and retracted laterally.  This was very thickened and 

abnormal to palpation.  The serosa was stripped inferiorly at the level of the 

neck of the gallbladder, with a fair amount of difficulty due to the thickened 

nature of the tissues at this level.  There was woody induration of the tissues 

at the level of the neck of the gallbladder and it could not be determined 

whether this was inflammatory or malignant in nature.  For this reason d

issection was carried out along the lower wall of the gallbladder and the 

gallbladder was traced inferiorly until it narrowed at the level of the neck of 

the gallbladder, or possibly the upper end of the cystic duct.  The decision was

made not to dissect any further down since the tissues were becoming more and 

more dense and it was felt that further dissection in this area would risk 

injury to critical structures.  The cystic artery was identified as dissection 

was carried out along the wall of the gallbladder and this was clipped and 

divided between clips.  The neck of the gallbladder was then divided and the 

stump closed with an Endoloop.  The gallbladder was then dissected free of the 

gallbladder bed using hook electrocautery.  Prior to complete removal of the 

gallbladder from the gallbladder bed, the area of the cystic duct/gallbladder 

neck and cystic artery stumps was examined.  The clips and Endoloop were in good

position completely across these structures and there was no bleeding and no 

leakage of bile. The gallbladder was then placed into an EndoCatch bag and drawn

out through the umbilical incision.  The umbilical trocar was replaced and the 

operative site examined. There was no significant bleeding or spillage of bile. 

The epigastric, right upper quadrant and right lateral abdominal trocars were 

removed and hemostasis verified.  Carbon dioxide gas was allowed to desufflate 

through the umbilical trocar which was then removed.  The umbilical fascia was 

closed using the previously placed figure-of-eight 0 Vicryl sutures with 

excellent technical result.  Subcutaneous tissues were reapproximated with 4-0 

Monocryl sutures tacking the umbilicus back down to the fascia.  The skin 

incisions were closed with 4-0 subcuticular Monocryl sutures and Dermabond  

dressings were placed.   The patient was extubated and taken to the recovery 

room in good condition.  There were no complications.  





ESTIMATED BLOOD LOSS:  Minimal.





SPECIMEN : Gallbladder and contents, and umbilical hernia sac.

## 2020-11-28 NOTE — PDOC.GSPN
Surgery Progress Note: Subj





- Subjective


Narrative: 





Asked by Dr. Lizarraga to see the patient.  I had seen her previously for her 

metastatic colon cancer and placed her Mediport last year.  She has had problems

with abdominal pain for the past 2 months and has had an extensive work-up for 

this.  Significant findings include thickening of the duodenum and right colon, 

evidence for carcinomatosis, and new thickening and nonfilling of the 

gallbladder.  Patient states that the pain is present at all times but worse 

when she tries to eat.  She also complains of early satiety.  She has been off 

of chemotherapy recently which she states was because she was in remission; 

however she has symptomatic bone metastases for which she is currently 

undergoing radiation therapy.  I have discussed the radiation fields with her [facility] oncologist and these do not include the bladder fossa or anterior 

abdominal wall.  She is diffusely tender to palpation on the right greater than 

the left.  She has a reducible but tender umbilical hernia.





Assessment/plan: Abdominal pain which is worse with eating, present for the past

2 months.  She has an abnormal appearing gallbladder with thickening of the 

gallbladder wall and nonfilling on HIDA scan.  However the gallbladder is not 

distended and the lumen is not large enough for placement of a cholecystostomy 

tube.  This was somewhat atypical of acute cholecystitis, in which case the 

gallbladder is usually distended.  I am concerned that the gallbladder may have 

an abnormal appearance due to infiltration by her cancer, in which case 

cholecystectomy would be extremely hazardous and not likely helpful.  The 

patient however would like to undergo laparoscopic cholecystectomy if possible. 

She understands that if she is found to have carcinomatosis and the gallbladder 

appears infiltrated by malignancy that cholecystectomy would not be attempted.  

She also understands that the early satiety is likely not due to the gallbladder

but more likely due to her duodenitis and underlying malignancy.  She also 

understands that the diffuse right-sided abdominal pain may be due to the 

duodenitis and colitis and underlying malignancy and may not be improved by 

cholecystectomy even if this is possible.  We will plan on repair of the 

umbilical hernia under the same anesthesia as this would be at one of the port 

sites.  Inherent risks of laparoscopic cholecystectomy were discussed.  These 

include but are not limited to bleeding, infection, risks of anesthesia, damage 

nearby structures including bowel liver and bile ducts, need for open surgery 

need for other procedures.  She understands accepts these risks and wishes to 

proceed.





Surgery Progress Note: Obj





- Vital signs


Vital signs: 


                            Vital Signs - Most Recent











Temp Pulse Resp BP Pulse Ox


 


 98.4 F   77   16   142/83 H  98 


 


 11/28/20 07:53  11/28/20 08:55  11/28/20 07:53  11/28/20 07:53  11/28/20 08:00














Surgery Progress Note: Results





- Labs


Result Diagrams: 


                                 11/28/20 06:00





                                 11/28/20 06:00


Lab results: 


                         Laboratory Results - last 12 hr











  11/28/20 11/28/20





  06:00 06:00


 


WBC   5.8


 


RBC   4.03 L


 


Hgb   13.1


 


Hct   37.6


 


MCV   93.3


 


MCH   32.5 H


 


MCHC   34.8


 


RDW   12.1


 


Plt Count   362


 


MPV   6.9 L


 


Neutrophils % (Manual)   48


 


Lymphocytes % (Manual)   14 L


 


Monocytes % (Manual)   5


 


Eosinophils % (Manual)   33 H


 


Plt Morphology Comment   Appears Adequate


 


Sodium  139 


 


Potassium  4.5 


 


Chloride  106 


 


Carbon Dioxide  23 


 


Anion Gap  15 


 


BUN  6 L 


 


Creatinine  0.62 


 


Estimated GFR (MDRD)  Greater than  90 


 


Glucose  93 


 


Calcium  9.4 


 


Total Bilirubin  1.5 H 


 


AST  271 H 


 


ALT  180 H 


 


Alkaline Phosphatase  856 H 


 


Serum Total Protein  7.2 


 


Albumin  3.4 L 


 


Globulin  3.8 H 


 


Albumin/Globulin Ratio  0.9 L

## 2020-11-29 LAB
ALBUMIN SERPL BCG-MCNC: 3.4 G/DL (ref 3.5–5)
ALP SERPL-CCNC: 833 U/L (ref 40–110)
ALT SERPL W P-5'-P-CCNC: 130 U/L (ref 8–55)
ANION GAP SERPL CALC-SCNC: 17 MMOL/L (ref 10–20)
AST SERPL-CCNC: 121 U/L (ref 5–34)
BASOPHILS # BLD AUTO: 0 THOU/UL (ref 0–0.2)
BASOPHILS NFR BLD AUTO: 0.6 % (ref 0–1)
BILIRUB SERPL-MCNC: 0.7 MG/DL (ref 0.2–1.2)
BUN SERPL-MCNC: 10 MG/DL (ref 9.8–20.1)
CALCIUM SERPL-MCNC: 9.2 MG/DL (ref 7.8–10.44)
CHLORIDE SERPL-SCNC: 102 MMOL/L (ref 98–107)
CO2 SERPL-SCNC: 21 MMOL/L (ref 22–29)
CREAT CL PREDICTED SERPL C-G-VRATE: 67 ML/MIN (ref 70–130)
EOSINOPHIL # BLD AUTO: 0.3 THOU/UL (ref 0–0.7)
EOSINOPHIL NFR BLD AUTO: 4.2 % (ref 0–10)
GLOBULIN SER CALC-MCNC: 3.8 G/DL (ref 2.4–3.5)
GLUCOSE SERPL-MCNC: 77 MG/DL (ref 70–105)
HGB BLD-MCNC: 12.8 G/DL (ref 12–16)
LYMPHOCYTES # BLD: 0.5 THOU/UL (ref 1.2–3.4)
LYMPHOCYTES NFR BLD AUTO: 8.1 % (ref 21–51)
MCH RBC QN AUTO: 30.9 PG (ref 27–31)
MCV RBC AUTO: 92.3 FL (ref 78–98)
MONOCYTES # BLD AUTO: 0.5 THOU/UL (ref 0.11–0.59)
MONOCYTES NFR BLD AUTO: 7 % (ref 0–10)
NEUTROPHILS # BLD AUTO: 5.4 THOU/UL (ref 1.4–6.5)
NEUTROPHILS NFR BLD AUTO: 80.1 % (ref 42–75)
PLATELET # BLD AUTO: 399 THOU/UL (ref 130–400)
POTASSIUM SERPL-SCNC: 3.9 MMOL/L (ref 3.5–5.1)
RBC # BLD AUTO: 4.13 MILL/UL (ref 4.2–5.4)
SODIUM SERPL-SCNC: 136 MMOL/L (ref 136–145)
WBC # BLD AUTO: 6.7 THOU/UL (ref 4.8–10.8)

## 2020-11-29 RX ADMIN — FAMOTIDINE SCH MG: 10 INJECTION, SOLUTION INTRAVENOUS at 20:53

## 2020-11-29 RX ADMIN — HYDROCODONE BITARTRATE AND ACETAMINOPHEN PRN TAB: 5; 325 TABLET ORAL at 04:45

## 2020-11-29 RX ADMIN — FAMOTIDINE SCH MG: 10 INJECTION, SOLUTION INTRAVENOUS at 08:46

## 2020-11-29 RX ADMIN — HYDROCODONE BITARTRATE AND ACETAMINOPHEN PRN TAB: 5; 325 TABLET ORAL at 16:25

## 2020-11-29 RX ADMIN — HYDROCODONE BITARTRATE AND ACETAMINOPHEN PRN TAB: 5; 325 TABLET ORAL at 20:52

## 2020-11-29 NOTE — PDOC.HOSPP
- Subjective


Encounter Date: 11/29/20


Encounter Time: 10:30


Subjective: 


Patient seen for follow-up regarding acute cholecystitis.  She reports pain is 

better.





- Objective


Vital Signs & Weight: 


                             Vital Signs (12 hours)











  Temp Pulse Resp BP BP Pulse Ox


 


 11/29/20 08:45   86   159/89 H  


 


 11/29/20 07:40  98.3 F  86  16   159/89 H  97








                                     Weight











Admit Weight                   94 lb


 


Weight                         94 lb














I&O: 


                                        











 11/28/20 11/29/20 11/30/20





 06:59 06:59 06:59


 


Intake Total 840 1080 


 


Balance 840 1080 











Result Diagrams: 


                                 11/29/20 06:31





                                 11/29/20 06:31


Additional Labs: 


I reviewed patient's labs and MAR





Hospitalist ROS





- Review of Systems


Cardiovascular: denies: chest pain, palpitations, orthopnea, paroxysmal noc. 

dyspnea, edema, light headedness


Gastrointestinal: reports: abdominal pain.  denies: nausea, vomiting, diarrhea, 

constipation, melena, hematochezia





- Medication


Medications: 


Active Medications











Generic Name Dose Route Start Last Admin





  Trade Name Freq  PRN Reason Stop Dose Admin


 


Acetaminophen  650 mg  11/20/20 18:44  11/27/20 13:10





  Acetaminophen 325 Mg Tab  PO   325 mg





  Q4H PRN   Administration





  Headache/Fever/Mild Pain (1-3)  


 


Hydrocodone Bitart/Acetaminophen  1 tab  11/21/20 08:26  11/29/20 16:25





  Hydrocodone/Acetaminophen 5/325 Mg Tablet  PO   1 tab





  Q4H PRN   Administration





  Moderate Pain (4-6)  


 


Alprazolam  0.5 mg  11/21/20 08:25  11/28/20 17:06





  Alprazolam 0.5 Mg Tab  PO   0.5 mg





  Q6H PRN   Administration





  Anxiety  


 


Amlodipine Besylate  10 mg  11/24/20 09:00  11/29/20 08:45





  Amlodipine 10 Mg Tab  PO   10 mg





  DAILY OWEN   Administration


 


Famotidine  20 mg  11/20/20 21:00  11/29/20 08:46





  Famotidine/Pf 20 Mg/2ml Vial  SLOW IVP   20 mg





  Q12HR OWEN   Administration


 


Gabapentin  300 mg  11/21/20 09:00  11/29/20 08:45





  Gabapentin 300 Mg Cap  PO   300 mg





  BID OWEN   Administration


 


Sodium Chloride  1,000 mls @ 70 mls/hr  11/27/20 18:00  11/29/20 11:49





  Normal Saline 0.9%  IV   1,000 mls





  .[id number] OWEN   Administration


 


Metoclopramide HCl  5 mg  11/21/20 08:27  11/24/20 20:02





  Metoclopramide Hcl 10 Mg/2 Ml Vial  IVP   5 mg





  Q6H PRN   Administration





  Nausea/Vomiting  


 


Ondansetron HCl  4 mg  11/24/20 19:04  11/24/20 20:02





  Ondansetron Pf 4 Mg/2 Ml Vial  IVP   4 mg





  Q6H PRN   Administration





  Nausea/Vomiting  


 


Polyethylene Glycol  17 gm  11/21/20 09:00  11/29/20 08:46





  Polyethylene Glycol 3350 17 Gm Packet  PO   Not Given





  BID OWEN  


 


Sodium Chloride  10 ml  11/20/20 18:44  11/26/20 20:42





  Flush - Normal Saline 10 Ml Syringe  IVF   10 ml





  Q12HR PRN   Administration





  Saline Flush  


 


Tramadol HCl  50 mg  11/21/20 02:26  11/28/20 17:06





  Tramadol Hcl 50 Mg Tab  PO   50 mg





  Q4H PRN   Administration





  Moderate Pain (4-6)  














- Exam


General Appearance: awake alert


Eye: anicteric sclera


ENT: moist mucosa


Neck: supple


Heart: RRR


Respiratory: CTAB


Gastrointestinal: soft, no guarding, no rigidity


Gastrointestinal - other findings: Mild right upper quadrant tenderness


Skin: no rashes


Psychiatric: normal affect, normal behavior





Hosp A/P





- Plan





-Assessment





(1) Acute cholecystitis without calculus


Code(s): K81.0 - ACUTE CHOLECYSTITIS   Status: Acute   





(2) Back pain


Code(s): M54.9 - DORSALGIA, UNSPECIFIED   Status: Acute   


Plan: 





(3) Elevated LFTs


Code(s): R79.89 - OTHER SPECIFIED ABNORMAL FINDINGS OF BLOOD CHEMISTRY   Status:

Acute   





(4) Abdominal pain


Code(s): R10.9 - UNSPECIFIED ABDOMINAL PAIN   Status: Chronic   


Qualifiers: 


   Abdominal location: right upper quadrant   Qualified Code(s): R10.11 - Right 

upper quadrant pain   





(5) Bipolar disorder


Code(s): F31.9 - BIPOLAR DISORDER, UNSPECIFIED   Status: Chronic   





(6) Metastatic colon cancer in female


Code(s): C18.9 - MALIGNANT NEOPLASM OF COLON, UNSPECIFIED   Status: Chronic   





(7) Schizophrenia


Code(s): F20.9 - SCHIZOPHRENIA, UNSPECIFIED   Status: Chronic   





(8) COPD (chronic obstructive pulmonary disease)


Status: Chronic   


Qualifiers: 


   COPD type: chronic bronchitis 





(9) HTN (hypertension)


Code(s): I10 - ESSENTIAL (PRIMARY) HYPERTENSION   Status: Chronic   


Qualifiers: 


   Hypertension type: essential hypertension   Qualified Code(s): I10 - 

Essential (primary) hypertension   





(10) Tobacco abuse


Code(s): Z72.0 - TOBACCO USE   Status: Chronic   





(11) Moderate protein-calorie malnutrition


Code(s): E44.0 - MODERATE PROTEIN-CALORIE MALNUTRITION   Status: Chronic   





- Plan








Patient is status post laparoscopic cholecystectomy and umbilical hernia repair 

on November 28, 2020.  She is clinically improving.  LFTs have improved.  Status

post radiation therapy for bone met.


Likely home in 24 to 48 hours.

## 2020-11-29 NOTE — PDOC.GSPN
Surgery Progress Note: Subj





- Subjective


Narrative: 





Patient is sore from surgery.  She cannot really tell if her pain is better from

preoperative.  She has only had liquids but has tolerated these okay.  Her 

incisions look good.  Vital signs are okay.  LFTs have come down a little bit 

from preoperatively.





Assessment/plan: Doing well status post laparoscopic cholecystectomy.  Awaiting 

pathology.  Advance diet as tolerated to low-fat regular.  From a surgical 

standpoint she can be discharged whenever medically appropriate.  She is to 

follow-up in my clinic in 2 weeks time and to avoid lifting more than 20 pounds 

for 2 weeks.





Surgery Progress Note: Obj





- Vital signs


Vital signs: 


                            Vital Signs - Most Recent











Temp Pulse Resp BP Pulse Ox


 


 98.3 F   86   16   159/89 H  97 


 


 11/29/20 07:40  11/29/20 08:45  11/29/20 07:40  11/29/20 08:45  11/29/20 07:40














Surgery Progress Note: Results





- Labs


Result Diagrams: 


                                 11/29/20 06:31





                                 11/29/20 06:31


Lab results: 


                         Laboratory Results - last 12 hr











  11/29/20 11/29/20





  06:31 06:31


 


WBC   6.7


 


RBC   4.13 L


 


Hgb   12.8


 


Hct   38.1


 


MCV   92.3


 


MCH   30.9


 


MCHC   33.5


 


RDW   12.3


 


Plt Count   399


 


MPV   6.7 L


 


Neutrophils %   80.1 H


 


Lymphocytes %   8.1 L


 


Monocytes %   7.0


 


Eosinophils %   4.2


 


Basophils %   0.6


 


Neutrophils #   5.4


 


Lymphocytes #   0.5 L


 


Monocytes #   0.5


 


Eosinophils #   0.3


 


Basophils #   0.0


 


Sodium  136 


 


Potassium  3.9 


 


Chloride  102 


 


Carbon Dioxide  21 L 


 


Anion Gap  17 


 


BUN  10 


 


Creatinine  0.63 


 


Estimated GFR (MDRD)  Greater than  90 


 


Glucose  77 


 


Calcium  9.2 


 


Total Bilirubin  0.7 


 


AST  121 H 


 


ALT  130 H 


 


Alkaline Phosphatase  833 H 


 


Serum Total Protein  7.2 


 


Albumin  3.4 L 


 


Globulin  3.8 H 


 


Albumin/Globulin Ratio  0.9 L

## 2020-11-30 VITALS — DIASTOLIC BLOOD PRESSURE: 89 MMHG | SYSTOLIC BLOOD PRESSURE: 161 MMHG | TEMPERATURE: 98.1 F

## 2020-11-30 LAB
ALBUMIN SERPL BCG-MCNC: 3.3 G/DL (ref 3.5–5)
ALP SERPL-CCNC: 794 U/L (ref 40–110)
ALT SERPL W P-5'-P-CCNC: 87 U/L (ref 8–55)
ANION GAP SERPL CALC-SCNC: 14 MMOL/L (ref 10–20)
AST SERPL-CCNC: 86 U/L (ref 5–34)
BASOPHILS # BLD AUTO: 0 THOU/UL (ref 0–0.2)
BASOPHILS NFR BLD AUTO: 0.5 % (ref 0–1)
BILIRUB SERPL-MCNC: 0.8 MG/DL (ref 0.2–1.2)
BUN SERPL-MCNC: 7 MG/DL (ref 9.8–20.1)
CALCIUM SERPL-MCNC: 9 MG/DL (ref 7.8–10.44)
CHLORIDE SERPL-SCNC: 105 MMOL/L (ref 98–107)
CO2 SERPL-SCNC: 23 MMOL/L (ref 22–29)
CREAT CL PREDICTED SERPL C-G-VRATE: 69 ML/MIN (ref 70–130)
EOSINOPHIL # BLD AUTO: 1.1 THOU/UL (ref 0–0.7)
EOSINOPHIL NFR BLD AUTO: 16.2 % (ref 0–10)
GLOBULIN SER CALC-MCNC: 3.8 G/DL (ref 2.4–3.5)
GLUCOSE SERPL-MCNC: 94 MG/DL (ref 70–105)
HGB BLD-MCNC: 12.4 G/DL (ref 12–16)
LYMPHOCYTES # BLD: 0.5 THOU/UL (ref 1.2–3.4)
LYMPHOCYTES NFR BLD AUTO: 7.5 % (ref 21–51)
MCH RBC QN AUTO: 30.5 PG (ref 27–31)
MCV RBC AUTO: 92.1 FL (ref 78–98)
MONOCYTES # BLD AUTO: 0.6 THOU/UL (ref 0.11–0.59)
MONOCYTES NFR BLD AUTO: 9.5 % (ref 0–10)
NEUTROPHILS # BLD AUTO: 4.5 THOU/UL (ref 1.4–6.5)
NEUTROPHILS NFR BLD AUTO: 66.4 % (ref 42–75)
PLATELET # BLD AUTO: 395 THOU/UL (ref 130–400)
POTASSIUM SERPL-SCNC: 3.5 MMOL/L (ref 3.5–5.1)
RBC # BLD AUTO: 4.07 MILL/UL (ref 4.2–5.4)
SODIUM SERPL-SCNC: 138 MMOL/L (ref 136–145)
WBC # BLD AUTO: 6.8 THOU/UL (ref 4.8–10.8)

## 2020-11-30 RX ADMIN — FAMOTIDINE SCH MG: 10 INJECTION, SOLUTION INTRAVENOUS at 09:17

## 2020-11-30 NOTE — PDOC.DS.DS
Provider





- Provider


Date of Admission: 


11/20/20 18:05





Date of Discharge: 11/30/20


Admitting Provider: 


Noel Sherwood MD





Consultations: General Surgery (Dr. Lizarraga; Dr. Tillman), Oncology (Dr. Franklin Mora), Radiation Oncology (Dr. Fregoso)


Primary Care Physician: 


Jl Lewis MD








Course





- Hospital Course


Hospital Course: 


Discharge diagnosis:


1.  Acute cholecystitis


2.  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


3.  Hyponatremia


4.  Abnormal liver function tests


5.  COVID-19 PCR test negative





Hospital course:


Patient is a pleasant 55-year-old lady who was admitted to the hospital on 

November 20, 2020 for abdominal cramping and back pain.  CT scan of the abdomen 

and pelvis showed osteolytic metastatic bone lesion at L3 vertebral body 

protruding into spinal canal, diffuse intrahepatic biliary ductal dilatation, 

new finding of contraction and mural enhancement of the gallbladder, possible 

pancolitis, new or increased volume of free intraperitoneal fluid in the 

dependent portion of the pelvic cavity, bilateral adrenal hyperplasia versus 

adrenal metastatic disease, diffuse mesenteric lymphadenopathy and mesenteric 

edema and duodenitis.  She was seen by medical oncology and radiation oncology 

services.  She underwent radiation treatment after CT simulation.  She had 

abdominal ultrasound, which showed that the gallbladder was not distended enough

for percutaneous cholecystostomy.  She was seen by general surgery service and 

underwent laparoscopic cholecystectomy and umbilical hernia repair.  She 

tolerated the surgery well and is being discharged home in a stable condition.





Many thanks for allowing me to participate in your patient's care.  Please feel 

free to contact me with any questions or concerns.





Discharge destination: Home





Total amount of time spent coordinating this discharge: 33 minutes


Resuscitation Status: 








11/21/20 11:44


Resuscitation Status Routine 


   Resuscitation Status: FULL: Full Resuscitation











- Labs


Lab Results: 


                                        





                                 11/30/20 05:41 





                                 11/30/20 05:41 





Abnormal Lab Results - Last 48 hrs





11/29/20 06:31: Carbon Dioxide 21 L,  H,  H, Alkaline Phosphatase 

833 H, Albumin 3.4 L, Globulin 3.8 H, Albumin/Globulin Ratio 0.9 L


11/29/20 06:31: RBC 4.13 L, MPV 6.7 L, Neutrophils % 80.1 H, Lymphocytes % 8.1 

L, Lymphocytes # 0.5 L


11/30/20 05:41: BUN 7 L, AST 86 H, ALT 87 H, Alkaline Phosphatase 794 H, Albumin

3.3 L, Globulin 3.8 H, Albumin/Globulin Ratio 0.9 L


11/30/20 05:41: RBC 4.07 L, MPV 7.3 L, Lymphocytes % 7.5 L, Eosinophils % 16.2 

H, Lymphocytes # 0.5 L, Monocytes # 0.6 H, Eosinophils # 1.1 H











- Physical Exam


Vitals: 


                             Vital Signs (12 hours)











  Temp Pulse Resp BP BP Pulse Ox


 


 11/30/20 09:17   75    


 


 11/30/20 07:29  98.1 F  75  18   161/89 H  98


 


 11/30/20 04:58  99.1 F  85  18  144/87 H   96








                                     Weight











Admit Weight                   94 lb


 


Weight                         94 lb














Physical Exam: 


The patient was seen and examined on the day of discharge.  Patient denies chest

pain or shortness of breath.  Vital signs are stable.  S1 and S2 are heard.  

Lungs are clear to auscultation bilaterally.








Plan





- Discharge Medications


Home Medications: 


                                        











 Medication  Instructions  Recorded  Confirmed  Type


 


Albuterol Sulfate HFA (OR) 2 puff INH Q6H PRN #1 inh 09/05/19 11/21/20 Rx





[Proventil Hfa (or)]    


 


Alendronate Sodium [Fosamax] 70 mg PO Q7D 09/10/19 11/21/20 History


 


ALPRAZolam [Xanax] 0.5 mg PO Q6H PRN 11/10/20 11/21/20 History


 


Gabapentin [Neurontin] 300 mg PO BID 11/10/20 11/21/20 History


 


traMADol HCl [Tramadol HCl] 50 mg PO PRN PRN 11/10/20 11/21/20 History


 


Amlodipine [Norvasc] 10 mg PO DAILY 11/21/20 11/21/20 History


 


Polyethylene Glycol 3350 [Miralax] 17 gm PO BID 11/21/20 11/21/20 History


 


Tiotropium Bromide [Spiriva] 18 mcg IH DAILY 11/21/20 11/21/20 History











Allergies: 








No Known Drug Allergies Allergy (Verified 11/10/20 22:32)


   








- Discharge Instructions


Discharge Instructions:: 


AVOID LIFTING MORE THAN 20 POUNDS FOR 2 WEEKS


Nourishment:: Regular Diet





- Follow up Plan


Referrals: 


ASAEL KING MD [MD Not on Staff] - 3 Days


Dacia Tillman MD [Active] - 14 Days


Taniya Hughes MD [Active] - 12/01/20 11:30 am


Jl Lewis MD [Primary Care Provider] - 


Disposition: HOME

## 2020-11-30 NOTE — PDOC.GSPN
Surgery Progress Note: Subj





- Subjective


Narrative: 





Feels better today.  Tolerating her diet okay.  Incisions look good.  LFTs are 

diminishing.  Okay to discharge home from surgical standpoint.  Follow-up in my 

clinic in 2 weeks.





Surgery Progress Note: Obj





- Vital signs


Vital signs: 


                            Vital Signs - Most Recent











Temp Pulse Resp BP Pulse Ox


 


 98.1 F   75   18   161/89 H  98 


 


 11/30/20 07:29  11/30/20 09:17  11/30/20 07:29  11/30/20 07:29  11/30/20 07:29














Surgery Progress Note: Results





- Labs


Result Diagrams: 


                                 11/30/20 05:41





                                 11/30/20 05:41


Lab results: 


                         Laboratory Results - last 12 hr











  11/30/20 11/30/20





  05:41 05:41


 


WBC   6.8


 


RBC   4.07 L


 


Hgb   12.4


 


Hct   37.4


 


MCV   92.1


 


MCH   30.5


 


MCHC   33.1


 


RDW   12.3


 


Plt Count   395


 


MPV   7.3 L


 


Neutrophils %   66.4


 


Lymphocytes %   7.5 L


 


Monocytes %   9.5


 


Eosinophils %   16.2 H


 


Basophils %   0.5


 


Neutrophils #   4.5


 


Lymphocytes #   0.5 L


 


Monocytes #   0.6 H


 


Eosinophils #   1.1 H


 


Basophils #   0.0


 


Sodium  138 


 


Potassium  3.5 


 


Chloride  105 


 


Carbon Dioxide  23 


 


Anion Gap  14 


 


BUN  7 L 


 


Creatinine  0.61 


 


Estimated GFR (MDRD)  Greater than  90 


 


Glucose  94 


 


Calcium  9.0 


 


Total Bilirubin  0.8 


 


AST  86 H 


 


ALT  87 H 


 


Alkaline Phosphatase  794 H 


 


Serum Total Protein  7.1 


 


Albumin  3.3 L 


 


Globulin  3.8 H 


 


Albumin/Globulin Ratio  0.9 L

## 2020-12-02 NOTE — PQF
CLINICAL DOCUMENTATION CLARIFICATION FORM: 

Dear : Noel Dietrich          Date / Time: 12/02/2020

Please exercise your independent, professional judgment in responding to the 
clarification form. 

Clinical indicators are provided on the bottom of this form for your review



Clarification of Pathology report: Gallbladder  Metastatic adenocarcinoma 
consistent with colorectal primary



Please check appropriate box(es):

 [ x ] Agree w the pathology finding of: Gallbladder  Metastatic adenocarcinoma
consistent with colorectal primary

 [  ] Other explanation of pathology findings (please specify)

 [  ] Other diagnosis ___________

 [  ] Unable to determine

Physician Signature:                Date/Time:



For continuity of documentation, please document condition throughout progress 
notes and discharge summary.  Thank You.



To be completed by CDI/Coding staff for physician review: 







 Present Clinical Indicators - Signs / Symptoms / Labs Results and Location in 

Medical Record

 

[X] Gallbladder  Metastatic adenocarcinoma consistent with colorectal primary 
Pathology report p1

 

[X] Distended edematous gallbladder with very dense fibrotic tissue at the 
level of the neck Operative report 11/28 Dr Tillman

 

[X] compliant of abdominal cramping and back pain H&P p1 11 Silve PA-C

 

Present Risk Factors                                                            
              Results and Location in Medical Record

 

[X] Acalculous cholecystitis Operative report 11/28 Dr Tillman

 

[X] Colon cancer H&P p1 11 Silve PA-C

 

[X] Bone metastasis H&P p1 11 Silve PA-C

 

[X] Smoker H&P p1 11 Silve PA-C

 

[X] Moderate PCM PN 11/21

 

Present Treatments                                                              
                Results and Location in Medical Record

 

[X] Laparoscopic Cholecystectomy Operative report 11/28 Dr Tillman

 

[X] Oncology Consult Consult Aruna Morrissey 11/21

 

[X] Rad consult Consult Dr Fregoso 11/23





CDS/ Signature: Gabby Moore      Phone #: [phone] ext 
[phone]    Date/Time: 12/02/2020 0606



                 This is a permanent part of the Medical Record

[facility]

## 2020-12-12 ENCOUNTER — HOSPITAL ENCOUNTER (INPATIENT)
Dept: HOSPITAL 92 - ERS | Age: 55
LOS: 7 days | Discharge: ANOTHER HOSPITAL | DRG: 656 | End: 2020-12-19
Attending: FAMILY MEDICINE | Admitting: INTERNAL MEDICINE
Payer: COMMERCIAL

## 2020-12-12 VITALS — BODY MASS INDEX: 15.4 KG/M2

## 2020-12-12 DIAGNOSIS — Z20.828: ICD-10-CM

## 2020-12-12 DIAGNOSIS — F20.9: ICD-10-CM

## 2020-12-12 DIAGNOSIS — N13.39: ICD-10-CM

## 2020-12-12 DIAGNOSIS — E44.0: ICD-10-CM

## 2020-12-12 DIAGNOSIS — Z90.710: ICD-10-CM

## 2020-12-12 DIAGNOSIS — R79.89: ICD-10-CM

## 2020-12-12 DIAGNOSIS — C18.8: ICD-10-CM

## 2020-12-12 DIAGNOSIS — N13.9: ICD-10-CM

## 2020-12-12 DIAGNOSIS — Z28.21: ICD-10-CM

## 2020-12-12 DIAGNOSIS — K83.1: ICD-10-CM

## 2020-12-12 DIAGNOSIS — C79.51: ICD-10-CM

## 2020-12-12 DIAGNOSIS — C78.89: ICD-10-CM

## 2020-12-12 DIAGNOSIS — C79.19: Primary | ICD-10-CM

## 2020-12-12 DIAGNOSIS — F12.10: ICD-10-CM

## 2020-12-12 DIAGNOSIS — M81.0: ICD-10-CM

## 2020-12-12 DIAGNOSIS — C78.7: ICD-10-CM

## 2020-12-12 DIAGNOSIS — Z79.899: ICD-10-CM

## 2020-12-12 DIAGNOSIS — G95.29: ICD-10-CM

## 2020-12-12 DIAGNOSIS — F31.9: ICD-10-CM

## 2020-12-12 DIAGNOSIS — I10: ICD-10-CM

## 2020-12-12 DIAGNOSIS — J44.9: ICD-10-CM

## 2020-12-12 LAB
ALBUMIN SERPL BCG-MCNC: 3.5 G/DL (ref 3.5–5)
ALP SERPL-CCNC: 1394 U/L (ref 40–110)
ALT SERPL W P-5'-P-CCNC: 203 U/L (ref 8–55)
ANION GAP SERPL CALC-SCNC: 18 MMOL/L (ref 10–20)
AST SERPL-CCNC: 256 U/L (ref 5–34)
BASOPHILS # BLD AUTO: 0.1 THOU/UL (ref 0–0.2)
BASOPHILS NFR BLD AUTO: 0.8 % (ref 0–1)
BILIRUB DIRECT SERPL-MCNC: 11.9 MG/DL (ref 0.1–0.3)
BILIRUB SERPL-MCNC: 15.3 MG/DL (ref 0.2–1.2)
BILIRUB SERPL-MCNC: 20.3 MG/DL (ref 0.2–1.2)
BUN SERPL-MCNC: 11 MG/DL (ref 9.8–20.1)
CALCIUM SERPL-MCNC: 9.3 MG/DL (ref 7.8–10.44)
CHLORIDE SERPL-SCNC: 92 MMOL/L (ref 98–107)
CK SERPL-CCNC: 102 U/L (ref 29–168)
CO2 SERPL-SCNC: 24 MMOL/L (ref 22–29)
CREAT CL PREDICTED SERPL C-G-VRATE: 0 ML/MIN (ref 70–130)
EOSINOPHIL # BLD AUTO: 1.1 THOU/UL (ref 0–0.7)
EOSINOPHIL NFR BLD AUTO: 13.5 % (ref 0–10)
GLOBULIN SER CALC-MCNC: 4.2 G/DL (ref 2.4–3.5)
GLUCOSE SERPL-MCNC: 116 MG/DL (ref 70–105)
HGB BLD-MCNC: 13.1 G/DL (ref 12–16)
LIPASE SERPL-CCNC: 5 U/L (ref 8–78)
LYMPHOCYTES # BLD: 0.7 THOU/UL (ref 1.2–3.4)
LYMPHOCYTES NFR BLD AUTO: 7.8 % (ref 21–51)
MCH RBC QN AUTO: 30.7 PG (ref 27–31)
MCV RBC AUTO: 89.3 FL (ref 78–98)
MONOCYTES # BLD AUTO: 0.7 THOU/UL (ref 0.11–0.59)
MONOCYTES NFR BLD AUTO: 8.8 % (ref 0–10)
NEUTROPHILS # BLD AUTO: 5.8 THOU/UL (ref 1.4–6.5)
NEUTROPHILS NFR BLD AUTO: 69 % (ref 42–75)
PLATELET # BLD AUTO: 489 THOU/UL (ref 130–400)
POTASSIUM SERPL-SCNC: 4 MMOL/L (ref 3.5–5.1)
RBC # BLD AUTO: 4.26 MILL/UL (ref 4.2–5.4)
SODIUM SERPL-SCNC: 130 MMOL/L (ref 136–145)
WBC # BLD AUTO: 8.4 THOU/UL (ref 4.8–10.8)

## 2020-12-12 PROCEDURE — 87635 SARS-COV-2 COVID-19 AMP PRB: CPT

## 2020-12-12 PROCEDURE — 87086 URINE CULTURE/COLONY COUNT: CPT

## 2020-12-12 PROCEDURE — 36415 COLL VENOUS BLD VENIPUNCTURE: CPT

## 2020-12-12 PROCEDURE — U0003 INFECTIOUS AGENT DETECTION BY NUCLEIC ACID (DNA OR RNA); SEVERE ACUTE RESPIRATORY SYNDROME CORONAVIRUS 2 (SARS-COV-2) (CORONAVIRUS DISEASE [COVID-19]), AMPLIFIED PROBE TECHNIQUE, MAKING USE OF HIGH THROUGHPUT TECHNOLOGIES AS DESCRIBED BY CMS-2020-01-R: HCPCS

## 2020-12-12 PROCEDURE — BT14ZZZ FLUOROSCOPY OF KIDNEYS, URETERS AND BLADDER: ICD-10-PCS

## 2020-12-12 PROCEDURE — 85025 COMPLETE CBC W/AUTO DIFF WBC: CPT

## 2020-12-12 PROCEDURE — 80048 BASIC METABOLIC PNL TOTAL CA: CPT

## 2020-12-12 PROCEDURE — 0T788DZ DILATION OF BILATERAL URETERS WITH INTRALUMINAL DEVICE, VIA NATURAL OR ARTIFICIAL OPENING ENDOSCOPIC: ICD-10-PCS

## 2020-12-12 PROCEDURE — 93005 ELECTROCARDIOGRAM TRACING: CPT

## 2020-12-12 PROCEDURE — 96374 THER/PROPH/DIAG INJ IV PUSH: CPT

## 2020-12-12 PROCEDURE — 76705 ECHO EXAM OF ABDOMEN: CPT

## 2020-12-12 PROCEDURE — 83735 ASSAY OF MAGNESIUM: CPT

## 2020-12-12 PROCEDURE — 94760 N-INVAS EAR/PLS OXIMETRY 1: CPT

## 2020-12-12 PROCEDURE — S0028 INJECTION, FAMOTIDINE, 20 MG: HCPCS

## 2020-12-12 PROCEDURE — 84100 ASSAY OF PHOSPHORUS: CPT

## 2020-12-12 PROCEDURE — 71045 X-RAY EXAM CHEST 1 VIEW: CPT

## 2020-12-12 PROCEDURE — 80053 COMPREHEN METABOLIC PANEL: CPT

## 2020-12-12 PROCEDURE — 82550 ASSAY OF CK (CPK): CPT

## 2020-12-12 PROCEDURE — 74177 CT ABD & PELVIS W/CONTRAST: CPT

## 2020-12-12 PROCEDURE — 74420 UROGRAPHY RTRGR +-KUB: CPT

## 2020-12-12 PROCEDURE — 82247 BILIRUBIN TOTAL: CPT

## 2020-12-12 PROCEDURE — 80076 HEPATIC FUNCTION PANEL: CPT

## 2020-12-12 PROCEDURE — 84484 ASSAY OF TROPONIN QUANT: CPT

## 2020-12-12 PROCEDURE — 83690 ASSAY OF LIPASE: CPT

## 2020-12-12 PROCEDURE — 94640 AIRWAY INHALATION TREATMENT: CPT

## 2020-12-12 RX ADMIN — FAMOTIDINE SCH MG: 10 INJECTION, SOLUTION INTRAVENOUS at 22:45

## 2020-12-12 NOTE — RAD
XR Chest 1 View Portable



HISTORY: Chest pain



COMPARISON: 11/10/2020



FINDINGS: The heart size is normal. The aorta is tortuous. Right-sided Port-A-Cath remains in place. 
The lungs are well expanded without focal areas of consolidation, pneumothorax or pleural

effusions.



IMPRESSION: No radiographic evidence of acute cardiopulmonary process.



Reported By: Ronn Ramírez 

Electronically Signed:  12/12/2020 3:04 PM

## 2020-12-12 NOTE — CT
CT abdomen and pelvis with IV contrast



HISTORY: Abdominal pain. Biliary dilatation.



COMPARISON: Multiple previous CT exams, including 11/20/2020.



FINDINGS: Minimal left pleural fluid.



Dilatation of the intrahepatic biliary system has progressed, with bile ducts of the left lobe measur
ing up to 0.9 cm. Common bile duct is not dilated. The hepatic duct, centered at axial image 22,

shows circumferential wall thickening and internal density slightly greater than fluid. Gallbladder i
s now surgically absent.



Adenopathy throughout the central abdomen is similar in appearance to the prior study. Bilateral adre
nal hyperplasia is stable.



Parenchymal scarring at the lateral aspect of the inferior pole of the right kidney unchanged in appe
arance.



Moderate dilatation of the left renal collecting system and proximal ureter now apparent to the level
 of the mid ureter near the L4-5 level, where a vertically oriented soft tissue density is 0.8 cm

length by 0.7 cm width.



Small amount of free fluid within the dependent portion of the pelvis. Edematous appearance of the wa
ll of the right colon is similar in appearance to prior studies. Inflammatory appearance of the

duodenum is less pronounced.



Within the lower pelvis just to the right of midline, a lobular well circumscribed hyperdensity is 1.
65 cm x 1.3 cm greatest diameters. No immediately adjacent arterial structures. Unchanged from the

11/20/2020 study. Possibly hyperdense lymph node or bowel content.



The lytic, destructive mass involving the posterior aspect of the L3 vertebral body, protruding into 
the central spinal canal is unchanged from the most recent exam.



  



IMPRESSION :

Interval worsening of intrahepatic biliary dilatation, especially within the left lobe, with some wade
e remaining in the nondilated common bile duct. Interval cholecystectomy.



Interval development of moderate left hydronephrosis. High-grade obstruction of the mid left ureter, 
possibly related to a ureteral lesion or compression from adjacent adenopathy which is not

impressive by size.



Lytic metastasis at the posterior aspect of the L3 vertebral body with compromise of the central spin
al canal is stable.



Abdominal adenopathy is stable.



Reported By: MAKYALA Tompkins 

Electronically Signed:  12/12/2020 7:20 PM

## 2020-12-12 NOTE — HP
CHIEF COMPLAINT:  Eyes look yellow and dizziness.



HISTORY OF PRESENT ILLNESS:  A 55-year-old female with past medical history of colon

cancer with bone/spinal metastasis, who recently underwent cholecystectomy,

presented to the emergency room with eyes turning yellow/jaundice and dark urine.

The patient does have a history of metastatic colon cancer and COPD.  She had a

cholecystectomy done at the end of November.  She denied abdominal pain, fever,

chills, or vomiting.  Workup in the emergency room, the patient was apparently

jaundiced with a total bilirubin of 15.3 and direct 11.9.  AST and ALT elevated at

256/203 respectively, alkaline phosphatase 1394.  Sodium is 130.  Lipase is normal.

Troponin less than 0.01.  WBC count 8.4, hemoglobin 13.1, platelets __________.

Imaging studies were done in the emergency room with CT of abdomen and pelvis

showing interval worsening of intrahepatic biliary dilatation especially within the

left lobe and some bilateral remaining in the nondilated common bile duct.  Also,

there is development of left hydronephrosis with obstruction in the mid left ureter.

 Possibly related to urethral lesion or compression from the adjacent adenopathy.

__________ at the posterior aspect of L3 vertebral body with compromise of the

central spinal canal, which has been stable comparing to prior studies.  Right upper

quadrant ultrasound showed an intrahepatic biliary duct dilatation.  No hepatic mass

is seen.  The common bile duct measures 5 mm.  ED physician discussed the case with

surgery and GI, who both will see the patient as a consultation.  The patient has

been admitted to hospital for further management. 



PAST MEDICAL HISTORY:  

1. As mentioned above in the history of present illness including colon cancer with

metastasis. 

2. COPD.

3. Hypertension.

4. Osteoporosis.



PAST SURGICAL HISTORY:  

1. Cholecystectomy, recent.

2. Hysterectomy.

3. Hernia repair.



PAST PSYCHIATRIC HISTORY:  Schizophrenia.



SOCIAL HISTORY:  She quit smoking a year ago.  She drinks socially, social drug use.



FAMILY HISTORY:  Reviewed and noncontributory.



HOME MEDICATIONS:  See home medication reconciliation form for updated medications.



ALLERGIES:  NO KNOWN ALLERGIES.



REVIEW OF SYSTEMS:  Review of 14 systems negative except what is mentioned in

history of present illness. 



PHYSICAL EXAMINATION:

GENERAL:  The patient is awake, alert, jaundiced.  Does not appear to be in acute

distress. 

VITAL SIGNS:  Blood pressure is 173/90, pulse is 87, respiratory rate is 18,

temperature 97.8, and oxygen saturation is 100% on room air. 

HEAD AND NECK:  Normocephalic and atraumatic.  Sclerae icteric.  Neck is supple. 

CHEST:  Fair bilateral air entry. 

HEART:  S1 and S2, regular. 

ABDOMEN:  Distended with mild epigastric tenderness.  Bowel sounds present. 

NEUROLOGIC:  Awake, alert.  No focal deficits. 

PSYCHIATRIC:  Unable to assess. 

EXTREMITIES:  No clubbing.  No cyanosis. 

GENITOURINARY:  No flank tenderness.



LABORATORY DATA:  As mentioned above in history of present illness.



IMAGING STUDIES:  As mentioned above in history of present illness.



ASSESSMENT:  

1. Jaundice, obstructive.

2. Colon cancer with metastasis, L3 vertebral body with compression of central

spinal canal, which has been stable. 

3. Chronic obstructive pulmonary disease.

4. Hypertension.



PLAN:  

1. Admit.

2. Surgery consulted for evaluation and further management.

3. GI consulted for evaluation and further management.

4. IV fluid hydration.

5. Reconcile home medications.

6. DVT prophylaxis as appropriate.

7. Expected length of stay, 2 midnights.







Job ID:  [id number]

## 2020-12-12 NOTE — ULT
RIGHT UPPER QUADRANT ULTRASOUND: 

12/12/20

 

HISTORY: 

Status post cholecystectomy. Right upper quadrant pain and jaundice. 

 

FINDINGS/IMPRESSION: 

There is intrahepatic biliary ductal dilatation. No hepatic mass is seen. the common duct measures 5 
mm in diameter. The patient is post cholecystectomy. The right kidney and visualized portions of the 
pancreas are unremarkable. No free fluid is seen in Gtz's pouch. 

 

POS: [facility]

## 2020-12-13 LAB
ALBUMIN SERPL BCG-MCNC: 2.8 G/DL (ref 3.5–5)
ALP SERPL-CCNC: 1045 U/L (ref 40–110)
ALT SERPL W P-5'-P-CCNC: 155 U/L (ref 8–55)
ANION GAP SERPL CALC-SCNC: 13 MMOL/L (ref 10–20)
AST SERPL-CCNC: 203 U/L (ref 5–34)
BASOPHILS # BLD AUTO: 0.1 THOU/UL (ref 0–0.2)
BASOPHILS NFR BLD AUTO: 0.9 % (ref 0–1)
BILIRUB SERPL-MCNC: 16.3 MG/DL (ref 0.2–1.2)
BUN SERPL-MCNC: 7 MG/DL (ref 9.8–20.1)
CALCIUM SERPL-MCNC: 8.3 MG/DL (ref 7.8–10.44)
CHLORIDE SERPL-SCNC: 102 MMOL/L (ref 98–107)
CO2 SERPL-SCNC: 24 MMOL/L (ref 22–29)
CREAT CL PREDICTED SERPL C-G-VRATE: 61 ML/MIN (ref 70–130)
EOSINOPHIL # BLD AUTO: 1.1 THOU/UL (ref 0–0.7)
EOSINOPHIL NFR BLD AUTO: 15.3 % (ref 0–10)
GLOBULIN SER CALC-MCNC: 3.2 G/DL (ref 2.4–3.5)
GLUCOSE SERPL-MCNC: 92 MG/DL (ref 70–105)
HGB BLD-MCNC: 10.1 G/DL (ref 12–16)
LYMPHOCYTES # BLD: 0.5 THOU/UL (ref 1.2–3.4)
LYMPHOCYTES NFR BLD AUTO: 7.4 % (ref 21–51)
MCH RBC QN AUTO: 29.2 PG (ref 27–31)
MCV RBC AUTO: 89.2 FL (ref 78–98)
MONOCYTES # BLD AUTO: 0.8 THOU/UL (ref 0.11–0.59)
MONOCYTES NFR BLD AUTO: 11.4 % (ref 0–10)
NEUTROPHILS # BLD AUTO: 4.7 THOU/UL (ref 1.4–6.5)
NEUTROPHILS NFR BLD AUTO: 65.1 % (ref 42–75)
PLATELET # BLD AUTO: 414 THOU/UL (ref 130–400)
POTASSIUM SERPL-SCNC: 3.4 MMOL/L (ref 3.5–5.1)
RBC # BLD AUTO: 3.47 MILL/UL (ref 4.2–5.4)
SODIUM SERPL-SCNC: 136 MMOL/L (ref 136–145)
WBC # BLD AUTO: 7.2 THOU/UL (ref 4.8–10.8)

## 2020-12-13 RX ADMIN — Medication SCH ML: at 21:52

## 2020-12-13 RX ADMIN — FAMOTIDINE SCH MG: 10 INJECTION, SOLUTION INTRAVENOUS at 08:00

## 2020-12-13 RX ADMIN — IPRATROPIUM BROMIDE SCH ML: 0.5 SOLUTION RESPIRATORY (INHALATION) at 20:31

## 2020-12-13 RX ADMIN — FAMOTIDINE SCH MG: 10 INJECTION, SOLUTION INTRAVENOUS at 21:51

## 2020-12-13 RX ADMIN — Medication SCH ML: at 08:01

## 2020-12-13 RX ADMIN — IPRATROPIUM BROMIDE SCH ML: 0.5 SOLUTION RESPIRATORY (INHALATION) at 13:55

## 2020-12-13 NOTE — CON
DATE OF CONSULTATION:  12/13/2020



REASON FOR CONSULTATION:  Hyperbilirubinemia, history of metastatic colon cancer.



CONSULTING PROVIDER:  Mag Mcpherson MD



HISTORY OF PRESENT ILLNESS:  The patient is a 55-year-old  female

with past medical history of COPD, hypertension, osteoporosis, schizophrenia,

asthma, and colon cancer of the ascending colon/hepatic flexure with metastatic

disease to the bone and now presumably liver, presenting with painless jaundice.

She states that she had recently undergone cholecystectomy via Dr. Tillman for

increased right upper quadrant abdominal pain and evidence of probable cholecystitis

on imaging and labs.  Per that operative report, there was a significant amount of

dense fibrous tissue in that area, but the gallbladder was able to be successfully

removed.  Surgical pathology report yielded that there was a presence of invasive

adenocarcinoma consistent with a colorectal origin.  The patient was then discharged

to home and was actually doing quite well with no recurrence of her abdominal pain.

However, approximately 3 days ago, she began having systemic itching all over her

body that was not alleviated with creams or salves that she was able to obtain over

the counter.  Over the last 24 hours, her family member also noticed significant

yellowing of her eyes which then ultimately prompted her to seek healthcare

assistance at the [facility] ER.  While in the ER, routine labs showed a

significant elevation in her bilirubin when compared to previous, thus prompting her

admission for further evaluation.  At this time, the patient is relatively

asymptomatic and states that she is doing well with no complaints of nausea,

vomiting (although the patient did have some vomiting with recent radiation to her

back), fevers, chills, hematemesis, melena, hematochezia, discoloration of her

stools, abdominal pain, dysphagia, odynophagia, diarrhea, constipation, or recent

weight loss. 



REVIEW OF SYSTEMS:  A 10-category review of systems was obtained with all responses

negative except for the pertinent positives as listed in the HPI. 



PAST MEDICAL HISTORY:  As per HPI.



PAST SURGICAL HISTORY:  Hysterectomy, hernia repair, recent cholecystectomy.



SOCIAL HISTORY:  The patient endorses drinking approximately 1 to 2 alcohol

beverages per week.  She also uses marijuana approximately 2 times per week as well.

 She currently denies any tobacco use with most recent use being approximately one

year ago. 



FAMILY HISTORY:  Denies any GI malignancies.



OUTPATIENT MEDICATIONS:  Reviewed.



ALLERGIES:  NO KNOWN DRUG ALLERGIES.



PHYSICAL EXAMINATION:

VITAL SIGNS:  Temperature 98.4, pulse 74, blood pressure 165/92, respiratory rate

18, saturating 98% on room air. 

GENERAL:  The patient was lying in bed, in no acute distress.  Alert and oriented

x4. 

HEENT:  Normocephalic, atraumatic. 

NECK:  Supple.  No JVD noted.  Positive scleral icterus. 

CARDIOVASCULAR:  Regular rate and rhythm with no discernible murmurs, gallops, or

rubs. 

RESPIRATORY:  Clear to auscultation bilaterally with no discernible wheezes or

rales. 

ABDOMEN:  Normoactive bowel sounds.  Soft, nondistended.  Tenderness to palpation in

the midepigastric, right upper quadrant and right mid abdomen with the majority of

pain centered around trocar sites. 

EXTREMITIES:  No cyanosis, clubbing, or edema.  Jaundice, pallor to palms of her

hands. 



LABORATORY DATA:  CBC with a white blood cell count of 7.2, hemoglobin 10.1,

hematocrit 31, platelets 414.  Chemistry with a sodium of 136, potassium 3.4,

chloride 102, CO2 of 24, BUN 7, creatinine 0.66, glucose 92.  , ,

alkaline phosphatase 1045, total bilirubin 16.3, albumin 2.8. 



IMAGING DATA:  A right upper quadrant ultrasound was obtained on December 12, 2020,

which showed intrahepatic dilatation with the common bile duct normal at 5 mm.

Surgical change consistent with cholecystectomy was also seen.  A CT scan of the

abdomen/pelvis was also obtained on December 12, 2020, which showed significant

dilatation of the intrahepatics which has slowly progressed based on her prior

imaging (dilation of the intrahepatics has been noted since at least September 2020), but the common bile duct also again measured 5 mm in size and surgical change

consistent with cholecystectomy with surgical clips located near the hilum.

Significant intraabdominal adenopathy was also seen throughout the abdomen, but

stable when compared to previous.  There was also moderate dilation of the left

renal collecting system secondary to a soft tissue density creating what appears to

be left hydronephrosis.  An edematous nature of the right colon was also seen but

stable when compared to previous with a lytic metastatic lesion to L3, again stable

in size. 



ASSESSMENT AND PLAN:  The patient is a 55-year-old female with past medical history

of COPD, hypertension, osteoporosis, schizophrenia, asthma, and colonic

adenocarcinoma with metastatic disease to the spine, gallbladder, and now presumably

the liver, presenting with hyperbilirubinemia, most likely secondary to her

metastatic disease. 



Hyperbilirubinemia.  The patient initially had a colonoscopy performed in mid 2019

at [facility], which showed a large near obstructive

mass at approximately 70 to 80 cm past the anal verge, but was only treated with

chemotherapy (no surgical debulking).  She had been followed by the Oncology Service

as part of treatment with chemotherapy with the last round being approximately in

May/June of this year.  However, more recently, she had been having increased right

upper quadrant abdominal pain with CT scan showing increased size of peripancreatic

and mesenteric lymph nodes concerning for the recurrence of her malignancy.  She

also had imaging findings concerning for the presence of cholecystitis.

Subsequently, the patient underwent cholecystectomy on November 28, 2020, which

showed a lot of dense fibrous tissue in and around the gallbladder itself, but was

able to be successfully extricated.  Surgical pathology showed the presence of

fibroadipose tissue compatible with the hernial sac, but the actual gallbladder

itself showed evidence of metastatic adenocarcinoma consistent with colorectal

primary.  The patient is now presenting approximately 2 weeks after her surgery with

significant hyperbilirubinemia and dilation of the intrahepatic biliary tree with

decompression of the extrahepatic biliary tree.  This intrahepatic dilatation has

been present on her CT scans for approximately 2 to 3 months at this time and most

likely is due to invasive adenocarcinoma from her colorectal cancer.  The

possibility of surgical injury to the common hepatic duct can be considered, but at

this point, there is no evidence of fluid collection within the gallbladder fossa

indicative of possible biliary leak.  Given these findings and the probable presence

of extrinsic compression of the distal intrahepatic biliary tree, the patient will

ultimately need decompression of this region most likely accomplished with a metal

stent as the patient undergoes treatment for her adenocarcinoma.  Unfortunately, we

do not have those capabilities here at this hospital, so therefore, I would

recommend transferring the patient to [facility] in [place] for further evaluation

and placement of stent at that site. 



RECOMMENDATIONS:  

1. Would continue to trend her LFTs and monitoring the patient during this

hospitalization. 

2. Would highly recommend transferring the patient to [facility] in [place] for

evaluation and ERCP with placement of a metal stent at that time. 

3. Would start the patient on cholestyramine 4 g daily in light of most likely

cholestatic pruritus. 

4. Would monitor the patient while on the cholestyramine for constipation as the

patient already has baseline constipation.  Would highly recommend a bowel regimen

of at least MiraLAX 17 g daily. 

5. Further imaging with MRI and MRCP would not be advised given the surgical clip

located near the site of probable obstruction and would therefore contribute to

significant artifact in that region, making the study less than diagnostic. 

We will continue to follow peripherally while the patient is still here.  Please

call with any questions. 







Job ID:  [id number]

## 2020-12-13 NOTE — CON
DATE OF CONSULTATION:  12/13/2020



REASON FOR CONSULTATION:  Left-sided flank pain and left hydroureter.



HISTORY OF PRESENT ILLNESS:  Ms. Michael Jo is a very pleasant 55-year-old

 female, recently retired from packing meat at the [employer].  She has a long history of alcohol and cigarette use and has recently been

diagnosed with colon cancer.  The patient has metastatic colon cancer at this point,

which involves the ascending colon and hepatic flexure area.  She has metastatic

disease to her L3 spine and presumably the liver based on presentation, which

includes painless jaundice with itching.  The patient underwent a recent

cholecystectomy by Dr. Tillman for right upper quadrant pain, but has not had

complete resolution of her pain symptoms at this point.  She does report severe

left-sided discomfort today.  This has been going on for a few weeks by her report.

CT scan performed on 12/12/2020 demonstrates a degree of hydronephrosis that is in

the area of a probable retroperitoneal lymph node suggesting a possible vascular or

lymph node cause for the patient's hydronephrosis. 



REVIEW OF SYSTEMS:  CONSTITUTIONAL:  The patient complains of itching and jaundice

type symptoms. 

PULMONARY:  Negative except for cigarette smoking history.  She has probably quit

smoking about 13 days ago. 

GASTROINTESTINAL:  Patient has jaundice and probable hepatic spread of her colon

cancer, and colon cancer diagnosis with metastases to multiple sites.  She does not

report constipation today.  She does have complaints of abdominal distention and

bloating.  Had a recent cholecystectomy. 

MUSCULOSKELETAL:  No specific complaints. 

GENITOURINARY:  The patient had a vaginal hysterectomy and reports that she did not

have her ovaries removed, but had menopause anyhow.  No complaints of recurrent

urinary tract infection. 

NEUROLOGIC: No specific complaints.



PHYSICAL EXAMINATION:

VITAL SIGNS:  Temperature is 97.9, pulse 74, respirations are 18, O2 saturation on

room air is 97%, blood pressure is 148/86. 

GENERAL:  This is a pleasant, relatively cachectic  female in no

distress.  She does report left-sided flank pain symptoms and some abdominal

distention. 

HEAD, EYES, EARS, NOSE, AND THROAT:  Extraocular movements are intact.  Sclerae are

slightly icteric.  Oropharynx is clear.  She has poor dentition. 

NECK:  Supple. 

LUNGS:  Clear to auscultation bilaterally with COPD type changes. 

CARDIAC:  There is a regular rhythm. 

ABDOMEN:  Soft and nonobese, but distended.  The patient underwent a previous

cholecystectomy and has had reportedly an umbilical hernia repair.  There are

appropriate laparoscopic scars consistent with that. 

GENITOURINARY:  Pelvic examination is deferred to the operative suite.   

BACK:  Positive left-sided costovertebral angle tenderness and also left anterior

abdominal tenderness on percussion.  There is some degree of increased resonance to

the abdomen. 

EXTREMITIES:  Within normal limits.  There is no clubbing, cyanosis, or edema.



LABORATORY STUDIES:  Patient's white count is 7200, hemoglobin is 10.1 with

hematocrit of 31.  There is no left shift with a current ANC of 4.7, and percent

neutrophils of 65.1%.  Serum chemistries show the creatinine at 0.66 with blood urea

nitrogen of 7 consistent with the patient's low body mass.  EGFR is greater than 90. 



RADIOLOGIC STUDIES:  CT scan of the abdomen and pelvis performed on 12/12/2020

demonstrates hydronephrosis on the left side, which terminates in the mid upper

ureter close to a possible lymph node.  This is on top of psoas as far as level

goes.  The patient's gallbladder is surgically absent.  Clips are present.  In the

pelvis, patient's bladder is relatively distended due to volume averaging and it is

not completely clear whether the patient has bladder wall masses or not.  There is

some thickening observed in some areas of her bladder.  The rectum is filled and

there appears to be a perirectal fluid collection. 



ASSESSMENT AND PLAN:  Left-sided hydroureter extending into mid ureter and

terminating at a level of possible lymphadenopathy.  I discussed with the patient

the options would be observation versus cystoscopy and left-sided stent placement

since she has apparent high-grade obstruction at the present time.  This patient

probably is going to require chronic indwelling stents bilaterally in the future

given the retroperitoneal lymphadenopathy if further interventions are not made

along the lines of therapy for that finding.  The patient reports chemotherapy only

at this point except for possible radiation treatment for a lumbar L3 lytic lesion. 



Plan will be cystoscopy and left-sided stent placement with retrograde pyelography. 



Over 70 minutes of initial consultation, evaluation and assessment time spent in the

assessment of this patient today. 







Job ID:  [id number]

## 2020-12-13 NOTE — RAD
Retrograde ureterogram intraoperative fluoroscopy



HISTORY: Ureteral obstruction.



FINDINGS: Intraoperative fluoroscopy was provided for retrograde study as performed by Dr. Reed. Spo
t fluoroscopic images show contrast opacification of a nondilated right ureter and renal collecting

system. Some tortuosity and irregularity of the right ureter is apparent.



There is also contrast opacification of a dilated left renal collecting system and proximal ureter wi
th a focal area of irregular narrowing at the level of L5. Distal ureter is not abnormally dilated.

Findings suggest partial obstruction of the mid left ureter.



Final image shows bilateral ureteral stents in good radiographic position.



Reported By: MAKAYLA Tompkins 

Electronically Signed:  12/13/2020 8:35 PM

## 2020-12-13 NOTE — PDOC.GSCN
Surgery Consult: [facility]





- Consult details


Date: 12/13/20


Time: 12:42


History of present illness: 





12/13/20 12:42


Chief complaint-I have been itching a lot





The patient is a very pleasant 55-year-old -American female with a past 

medical history of COPD, hypertension, schizophrenia, colon cancer who presents 

with painless jaundice.  She underwent a laparoscopic cholecystectomy several 

weeks ago for right upper quadrant pain.  Preoperatively, imaging showed dilatio

n of the left hepatic biliary tree.  The gallbladder was removed and there was 

reports of dense fibrous tissue that turned out to be consistent with 

adenocarcinoma from a colon source.





Over the last several days, she has noticed diffuse itching with darkened urine.

 She also noticed that her eyes were becoming more jaundiced and this prompted 

her to seek emergency care last night.  In the emergency room, she was found to 

have a direct hyperbilirubinemia.  Imaging showed a normal common bile duct, but

CT scan demonstrated diffuse worsening of her ductal dilatation of the left 

biliary system.





The patient has somewhat of a decreased appetite and only eats small meals.  She

does not complain of any nausea or vomiting.  No fevers or chills.  No melena or

hematochezia.  No marco colored stools at this point.





Surgery Consult: [facility]


Past Medical History: 





Per HPI


Past Surgical History: 





Hysterectomy, hernia repair, laparoscopic cholecystectomy, colonoscopy





- Past Family History


Pertinent family history: 





No history of colon cancer





- Past Social History


Smoking Status: Never smoker


Alcohol Use: occasional


Drug Use History: marijuana





Surgery Consult: Exam





- Vital signs


Vital signs: 


                            Vital Signs - Most Recent











Temp Pulse Resp BP Pulse Ox


 


 98.1 F   79   18   163/89 H  93 L


 


 12/13/20 12:07  12/13/20 12:07  12/13/20 12:07  12/13/20 12:07  12/13/20 12:07














- Physical Exam


Additional exam: 





General-cachectic, no acute distress


Head-[normocephalic, atraumatic]


HEENT- [EOMI], [PERRLA], scleral icterus


Neck-[trachea midline, supple]


Lungs-[grossly clear to auscultation], [normal air movement]


Heart-[regular regular], [no murmurs]


Abdomen-[soft], [nondistended], [nontender], [normal active bowel sounds]


Musculosketal-[full range of motion], [no gross deformity]


Psychiatric-[good insight, good judgment]


Skin-[good turgor, no jaundice]


Neuro- [GCS 15], [CN II-XII intact]





Surgery Consult: Meds





- Medications


Medications: 


                               Current Medications





Albuterol/Ipratropium (Ipratropium/Albuterol Sulfate 3 Ml Neb)  3 ml NEB Q6H PRN


   PRN Reason: SOB &/or Wheezing


Alprazolam (Alprazolam 0.5 Mg Tab)  0.5 mg PO Q6H PRN


   PRN Reason: Anxiety


   Last Admin: 12/13/20 10:55 Dose:  0.5 mg


   Documented by: 


Amlodipine Besylate (Amlodipine 10 Mg Tab)  10 mg PO DAILY [facility]


Cholestyramine Resin (Cholestyramine/Aspartame 4 Gm Packet)  4 gm PO DAILY [facility]


Famotidine (Famotidine/Pf 20 Mg/2ml Vial)  20 mg SLOW IVP Q12HR OWEN


   Last Admin: 12/13/20 08:00 Dose:  20 mg


   Documented by: 


Gabapentin (Gabapentin 300 Mg Cap)  300 mg PO BID OWEN


Sodium Chloride (Normal Saline 0.9%)  1,000 mls @ 75 mls/hr IV .[id number] OWEN


   Last Admin: 12/13/20 10:55 Dose:  1,000 mls


   Documented by: 


Ipratropium Bromide (Ipratropium Bromide 2.5 Ml Neb)  2.5 ml NEB [id number] OWEN


Morphine Sulfate (Morphine 4 Mg/Ml Vial)  4 mg SLOW IVP Q3H PRN


   PRN Reason: Pain


   Last Admin: 12/13/20 08:27 Dose:  4 mg


   Documented by: 


Polyethylene Glycol (Polyethylene Glycol 3350 17 Gm Packet)  17 gm PO BID OWEN


Sodium Chloride (Flush - Normal Saline 10 Ml Syringe)  10 ml IVF Q12HR OWEN


   Last Admin: 12/13/20 08:01 Dose:  10 ml


   Documented by: 


Sodium Chloride (Flush - Normal Saline 10 Ml Syringe)  10 ml IVF PRN PRN


   PRN Reason: Saline Flush


Tramadol HCl (Tramadol Hcl 50 Mg Tab)  50 mg PO TIDPRN PRN


   PRN Reason: Mild-Moderate Pain (1-5)


   Last Admin: 12/13/20 10:55 Dose:  50 mg


   Documented by: 











- Allergies


Allergies/Adverse Reactions: 


                                    Allergies











Allergy/AdvReac Type Severity Reaction Status Date / Time


 


No Known Drug Allergies Allergy   Verified 11/10/20 22:32














Surgery Consult: Results





- Labs


Result Diagrams: 


                                 12/13/20 05:02





                                 12/13/20 05:02


Lab results: 


                               Laboratory Results











WBC  7.2 thou/uL (4.8-10.8)   12/13/20  05:02    


 


RBC  3.47 mill/uL (4.20-5.40)  L  12/13/20  05:02    


 


Hgb  10.1 g/dL (12.0-16.0)  L  12/13/20  05:02    


 


Hct  31.0 % (36.0-47.0)  L  12/13/20  05:02    


 


MCV  89.2 fL (78.0-98.0)   12/13/20  05:02    


 


MCH  29.2 pg (27.0-31.0)   12/13/20  05:02    


 


MCHC  32.7 g/dL (32.0-36.0)   12/13/20  05:02    


 


RDW  14.4 % (11.5-14.5)   12/13/20  05:02    


 


Plt Count  414 thou/uL (130-400)  H  12/13/20  05:02    


 


MPV  7.3 fL (7.4-10.4)  L  12/13/20  05:02    


 


Neutrophils %  65.1 % (42.0-75.0)   12/13/20  05:02    


 


Neutrophils % (Manual)  Not Reportable   12/13/20  05:02    


 


Lymphocytes %  7.4 % (21.0-51.0)  L  12/13/20  05:02    


 


Monocytes %  11.4 % (0.0-10.0)  H  12/13/20  05:02    


 


Eosinophils %  15.3 % (0.0-10.0)  H  12/13/20  05:02    


 


Basophils %  0.9 % (0.0-1.0)   12/13/20  05:02    


 


Neutrophils #  4.7 thou/uL (1.40-6.50)   12/13/20  05:02    


 


Lymphocytes #  0.5 thou/uL (1.20-3.40)  L  12/13/20  05:02    


 


Monocytes #  0.8 thou/uL (0.11-0.59)  H  12/13/20  05:02    


 


Eosinophils #  1.1 thou/uL (0.0-0.7)  H  12/13/20  05:02    


 


Basophils #  0.1 thou/uL (0.0-0.2)   12/13/20  05:02    


 


Sodium  136 mmol/L (136-145)   12/13/20  05:02    


 


Potassium  3.4 mmol/L (3.5-5.1)  L  12/13/20  05:02    


 


Chloride  102 mmol/L ()   12/13/20  05:02    


 


Carbon Dioxide  24 mmol/L (22-29)   12/13/20  05:02    


 


Anion Gap  13 mmol/L (10-20)   12/13/20  05:02    


 


BUN  7 mg/dL (9.8-20.1)  L  12/13/20  05:02    


 


Creatinine  0.66 mg/dL (0.6-1.1)   12/13/20  05:02    


 


Estimated GFR (MDRD)  Greater than  90   12/13/20  05:02    


 


Glucose  92 mg/dL ()   12/13/20  05:02    


 


Calcium  8.3 mg/dL (7.8-10.44)   12/13/20  05:02    


 


Total Bilirubin  16.3 mg/dL (0.2-1.2)  H  12/13/20  05:02    


 


Direct Bilirubin  11.9 mg/dL (0.1-0.3)  H  12/12/20  19:02    


 


AST  203 U/L (5-34)  H  12/13/20  05:02    


 


ALT  155 U/L (8-55)  H  12/13/20  05:02    


 


Alkaline Phosphatase  1045 U/L ()  H  12/13/20  05:02    


 


Creatine Kinase  102 U/L ()   12/12/20  14:45    


 


Troponin I  Less than  0.010 ng/mL (< 0.028)   12/12/20  14:45    


 


Serum Total Protein  6.0 g/dL (6.0-8.3)   12/13/20  05:02    


 


Albumin  2.8 g/dL (3.5-5.0)  L  12/13/20  05:02    


 


Globulin  3.2 g/dL (2.4-3.5)   12/13/20  05:02    


 


Albumin/Globulin Ratio  0.9 g/dL (1.2-2.2)  L  12/13/20  05:02    


 


Lipase  5 U/L (8-78)  L  12/12/20  14:45    


 


SARS-CoV-2 (PCR)  Not Detected  (NotDetected)   12/13/20  02:16    














- Radiology Interpretation


  ** CT scan - abdomen


Additional comments: 





I independently viewed the images of the CT scan of the abdomen and pelvis.  

Also read the radiologist interpretation.  Most significantly, there is 

increased biliary dilatation within the left collecting system.  Compared to pr

ior exams, it has increased, yet was present on prior exams





Surgery Consult: A/P





- Problem


(1) Elevated LFTs


Current Visit: No   Code(s): R79.89 - OTHER SPECIFIED ABNORMAL FINDINGS OF BLOOD

CHEMISTRY   Status: Acute   


Assessment and Plan: 


I read the note from gastroenterology.  I agree with Dr. Antonio recommendations 

for transfer to a higher level of care.  Given the appearance of the liver on 

imaging prior to her cholecystectomy, she probably has a malignant stricture 

that was in evolution.  I do not think this is a postoperative complication.  

She would be better served at a higher level of care.  There is no indication 

for acute surgical intervention at this juncture








(2) Malignant neoplasm of overlapping sites of colon


Current Visit: No   Code(s): C18.8 - MALIGNANT NEOPLASM OF OVERLAPPING SITES OF 

COLON   Status: Chronic   


Assessment and Plan: 


Right colon cancer near the liver.  She has had chemotherapy without surgical 

resection at [facility].

## 2020-12-13 NOTE — OP
DATE OF PROCEDURE:  12/12/2020



PREOPERATIVE DIAGNOSES:  

1. Left ureteral obstruction by enlarged retroperitoneal node.

2. Metastatic colon cancer.



POSTOPERATIVE DIAGNOSES:  

1. Left ureteral obstruction by enlarged retroperitoneal node.

2. Metastatic colon cancer.

3. Bilateral ureteral impingement by retroperitoneal lymph nodes.



PROCEDURES PERFORMED:  

1. Bilateral retrograde pyelography with cystoscopy, 78876-27-Y.

2. Cystourethroscopy with bilateral ureteral stent placement, [id number].



ASSISTANT SURGEON:  None.



SPECIMENS REMOVED:  Urine removed from the left renal pelvis for culture and

urinalysis. 



BRIEF HISTORY AND INDICATION FOR PROCEDURE:  Ms. Michael Rojas is a very

pleasant 55-year-old  female, employed by [employer] as a

, who has a history of colon cancer and presented with left-sided

hydronephrosis and underwent CT scan demonstrating retroperitoneal lymph nodes.  She

presented for cystoscopy, left-sided stent placement, and retrograde pyelography on

both sides. 



DESCRIPTION OF PROCEDURE:  The patient was appropriately identified in the

preoperative holding area.  Informed written consent was obtained.  The patient was

transported to the operative suite, placed in the supine position.  General

anesthesia was established using a LMA airway.  The patient was repositioned in

supine lithotomy position and prepped and draped in the usual sterile fashion.

Cystoscopic evaluation was performed.  Introducing a 22-Argentine cystoscope sheath

with an obturator into the patient's bladder.  We then placed a 30-degree optic and

pan endoscopically evaluated the patient's bladder and found two orthotopic ureteric

openings.  There was no efflux on the left side and some efflux was seen on the

right side, which appeared to be Pyridium stained or dark colored urine.  The

patient's bladder was pan endoscopically evaluated and posteriorly, we noticed some

degree of impingement on the posterior aspect of the patient's bladder on the right

side.  We performed bilateral retrograde pyelography using cone-tipped catheter and

noticed impingement of the ureters on both sides.  There was prominent multilevel

impingement by lymph nodes on the patient's right side, though obstruction was not

observed.  On the patient's left side, complete obstruction was observed in the

upper 2/3rds of the patient's ureter by impinging nodes.  The patient did not have

actual internal luminal ureteral filling defects on either side.  We placed a

4.5-Argentine x 28 cm double-J ureteral stent in the patient's left ureter draining the

patient's collecting system.  The string was removed.  We then placed due to the

presence of right-sided multiple impinging nodes, a 4.5-Argentine x 28 cm double-J

ureteral stent in the right ureter.  The patient's bladder was drained at the close

of the procedure.  Strings were removed from both of the stents during the course of

the procedure.  The patient will need to return to clinic to schedule upsizing of

her stents in the near future.  Follow up is required for this procedure. 



The patient was transported to the postop recovery area in good condition.



ESTIMATED BLOOD LOSS:  0 mL.



DRAINS AND TUBES:  A 4.5-Argentine x 28 cm stents, one in the right ureter and one in

the left. 



Specimens; urine was aspirated from the patient's left renal pelvis during the

course of the procedure for culture purposes due to the high grade of obstruction

and presumed long duration. 







Job ID:  [id number]

## 2020-12-14 LAB
ALBUMIN SERPL BCG-MCNC: 2.9 G/DL (ref 3.5–5)
ALP SERPL-CCNC: 1195 U/L (ref 40–110)
ALT SERPL W P-5'-P-CCNC: 156 U/L (ref 8–55)
ANION GAP SERPL CALC-SCNC: 15 MMOL/L (ref 10–20)
AST SERPL-CCNC: 187 U/L (ref 5–34)
BILIRUB SERPL-MCNC: 18.9 MG/DL (ref 0.2–1.2)
BUN SERPL-MCNC: 7 MG/DL (ref 9.8–20.1)
CALCIUM SERPL-MCNC: 8.7 MG/DL (ref 7.8–10.44)
CHLORIDE SERPL-SCNC: 101 MMOL/L (ref 98–107)
CO2 SERPL-SCNC: 23 MMOL/L (ref 22–29)
CREAT CL PREDICTED SERPL C-G-VRATE: 54 ML/MIN (ref 70–130)
GLOBULIN SER CALC-MCNC: 3.6 G/DL (ref 2.4–3.5)
GLUCOSE SERPL-MCNC: 101 MG/DL (ref 70–105)
POTASSIUM SERPL-SCNC: 3.1 MMOL/L (ref 3.5–5.1)
SODIUM SERPL-SCNC: 136 MMOL/L (ref 136–145)

## 2020-12-14 RX ADMIN — Medication SCH: at 20:54

## 2020-12-14 RX ADMIN — IPRATROPIUM BROMIDE SCH ML: 0.5 SOLUTION RESPIRATORY (INHALATION) at 07:04

## 2020-12-14 RX ADMIN — IPRATROPIUM BROMIDE SCH ML: 0.5 SOLUTION RESPIRATORY (INHALATION) at 11:28

## 2020-12-14 RX ADMIN — IPRATROPIUM BROMIDE SCH ML: 0.5 SOLUTION RESPIRATORY (INHALATION) at 18:25

## 2020-12-14 RX ADMIN — IPRATROPIUM BROMIDE SCH ML: 0.5 SOLUTION RESPIRATORY (INHALATION) at 00:57

## 2020-12-14 RX ADMIN — FAMOTIDINE SCH MG: 10 INJECTION, SOLUTION INTRAVENOUS at 19:59

## 2020-12-14 RX ADMIN — Medication SCH ML: at 09:52

## 2020-12-14 RX ADMIN — FAMOTIDINE SCH MG: 10 INJECTION, SOLUTION INTRAVENOUS at 09:52

## 2020-12-14 NOTE — PDOC.HOSPP
- Subjective


Encounter Date: 12/13/20


Encounter Time: 10:30


Subjective: 


Patient up in bed denies any complaints.





- Objective


Vital Signs & Weight: 


                             Vital Signs (12 hours)











  Temp Pulse Resp BP BP Pulse Ox


 


 12/14/20 07:30  97.5 F L  119 H  18   150/105 H  98


 


 12/14/20 07:04   76  14    96


 


 12/14/20 03:47  98.1 F  102 H  16   132/81  96


 


 12/14/20 03:15   105 H  16   133/89 


 


 12/14/20 02:15   112 H  16   129/88 


 


 12/14/20 01:15   105 H  16   149/98 H 


 


 12/14/20 00:57       96


 


 12/14/20 00:15   103 H  16   135/87 


 


 12/13/20 23:32  97.3 F L  109 H  16   135/83  97


 


 12/13/20 22:45   90  16   146/86 H 


 


 12/13/20 22:31   74   181/111 H  


 


 12/13/20 22:15   75  16   181/111 H 


 


 12/13/20 21:45   74  16   168/96 H 


 


 12/13/20 21:15  97.1 F L  75  16   167/100 H  98








                                     Weight











Weight                         90 lb














I&O: 


                                        











 12/13/20 12/14/20 12/15/20





 06:59 06:59 06:59


 


Intake Total 900 2015 


 


Output Total  300 


 


Balance 900 1715 











Result Diagrams: 


                                 12/13/20 05:02





                                 12/13/20 05:02





Hospitalist ROS





- Review of Systems


Cardiovascular: denies: chest pain, palpitations, orthopnea, paroxysmal noc. 

dyspnea, edema, light headedness, other


Gastrointestinal: reports: abdominal pain


Genitourinary: denies: dysuria, frequency, incontinence, hematuria, retention, 

other





- Medication


Medications: 


Active Medications











Generic Name Dose Route Start Last Admin





  Trade Name Freq  PRN Reason Stop Dose Admin


 


Alprazolam  0.5 mg  12/13/20 10:39  12/13/20 17:36





  Alprazolam 0.5 Mg Tab  PO   0.5 mg





  Q6H PRN   Administration





  Anxiety  


 


Cholestyramine Resin  4 gm  12/14/20 09:00  12/14/20 08:43





  Cholestyramine/Aspartame 4 Gm Packet  PO   4 gm





  DAILY OWEN   Administration


 


Famotidine  20 mg  12/12/20 21:00  12/13/20 21:51





  Famotidine/Pf 20 Mg/2ml Vial  SLOW IVP   20 mg





  Q12HR OWEN   Administration


 


Gabapentin  300 mg  12/13/20 21:00  12/13/20 22:30





  Gabapentin 300 Mg Cap  PO   300 mg





  BID OWEN   Administration


 


Sodium Chloride  1,000 mls @ 75 mls/hr  12/12/20 21:00  12/14/20 05:54





  Normal Saline 0.9%  IV   Not Given





  [id number] OWEN  


 


Ipratropium Bromide  2.5 ml  12/13/20 13:00  12/14/20 07:04





  Ipratropium Bromide 2.5 Ml Neb  NEB   2.5 ml





  [id number] OWEN   Administration


 


Morphine Sulfate  4 mg  12/12/20 23:24  12/13/20 08:27





  Morphine 4 Mg/Ml Vial  SLOW IVP   4 mg





  Q3H PRN   Administration





  Pain  


 


Polyethylene Glycol  17 gm  12/13/20 21:00  12/13/20 22:30





  Polyethylene Glycol 3350 17 Gm Packet  PO   17 gm





  BID OWEN   Administration


 


Sodium Chloride  10 ml  12/13/20 09:00  12/13/20 21:52





  Flush - Normal Saline 10 Ml Syringe  IVF   10 ml





  Q12HR OWEN   Administration


 


Tramadol HCl  50 mg  12/13/20 10:39  12/13/20 22:33





  Tramadol Hcl 50 Mg Tab  PO   50 mg





  TIDPRN PRN   Administration





  Mild-Moderate Pain (1-5)  


 


Trospium  20 mg  12/13/20 21:00  12/13/20 22:30





  Trospium 20 Mg Tab  PO   20 mg





  BID OWEN   Administration














- Exam


Neck: negative: supple, symmetric, no JVD, no thyromegaly, no lymphadenopathy, 

no carotid bruit, JVD


Heart: negative: RRR, no murmur, no gallops, no rubs, normal peripheral pulses, 

irregular, diminshed peripheral pulses, murmur present, II/IV, III/IV


Respiratory: negative: CTAB, no wheezes, no rales, no ronchi, normal chest 

expansion, no tachypnea, normal percussion, rales, rhonchi, tachypneic, wheezes


Gastrointestinal: soft, normal bowel sounds





Hosp A/P


(1) Elevated LFTs


Code(s): R79.89 - OTHER SPECIFIED ABNORMAL FINDINGS OF BLOOD CHEMISTRY   Status:

Acute   





(2) Abdominal pain


Code(s): R10.9 - UNSPECIFIED ABDOMINAL PAIN   Status: Chronic   


Qualifiers: 


   Abdominal location: right upper quadrant   Qualified Code(s): R10.11 - Right 

upper quadrant pain   





(3) COPD (chronic obstructive pulmonary disease)


Status: Chronic   


Qualifiers: 


   COPD type: chronic bronchitis 





(4) Metastatic colon cancer in female


Code(s): C18.9 - MALIGNANT NEOPLASM OF COLON, UNSPECIFIED   Status: Chronic   





(5) Moderate protein-calorie malnutrition


Code(s): E44.0 - MODERATE PROTEIN-CALORIE MALNUTRITION   Status: Chronic   





(6) Schizophrenia


Code(s): F20.9 - SCHIZOPHRENIA, UNSPECIFIED   Status: Chronic   





- Plan





Is a very pleasant 55-year-old female who initially presented to the hospital 

with abdominal pain and was found to have hyperbilirubinemia.  Patient has a 

history of COPD hypertension osteoporosis and schizophrenia was diagnosed with 

colon cancer initially at [facility] in mid 2019.  Patient at this time 

underwent surgery and followed up with oncology for chemotherapy.





Patient recently started having more right upper quadrant abdominal pain at this

time a CT scan indicated possible cholecystitis and patient underwent a 

cholecystectomy.  The pathology from her cholecystectomy indicated metastatic 

adenocarcinoma consistent with colorectal primary.





Patient now comes in with abdominal pain and was noted to have hyper 

bilirubinemia.  Her CT scan indicates significant extrinsic compression of the 

distal intrahepatic biliary tree.  I did speak with GI who feels the patient 

needs to be transferred to higher level care for stenting.





We did initiate transfer.





Also consult urology given her significant hydronephrosis without any renal 

calculi.

## 2020-12-14 NOTE — PDOC.HOSPP
- Subjective


Encounter Date: 12/14/20


Encounter Time: 10:30


Subjective: 


Patient up in bed no problems





- Objective


Vital Signs & Weight: 


                             Vital Signs (12 hours)











  Temp Pulse Resp BP BP Pulse Ox


 


 12/14/20 15:25  98.2 F  113 H  18   144/96 H  96


 


 12/14/20 11:28   81  12    95


 


 12/14/20 11:20  98.1 F  101 H  18   146/95 H  100


 


 12/14/20 09:31   119 H   150/105 H  


 


 12/14/20 07:30  97.5 F L  119 H  18   150/105 H  98


 


 12/14/20 07:04   76  14    96








                                     Weight











Admit Weight                   90 lb


 


Weight                         90 lb














I&O: 


                                        











 12/13/20 12/14/20 12/15/20





 06:59 06:59 06:59


 


Intake Total 900 2015 


 


Output Total  300 


 


Balance 900 1715 











Result Diagrams: 


                                 12/13/20 05:02





                                 12/14/20 08:51





Hospitalist ROS





- Review of Systems


Cardiovascular: denies: chest pain, palpitations, orthopnea, paroxysmal noc. 

dyspnea, edema, light headedness, other


Gastrointestinal: denies: nausea, vomiting, abdominal pain, diarrhea, 

constipation, melena, hematochezia, other


Genitourinary: denies: dysuria, frequency, incontinence, hematuria, retention, 

other





- Medication


Medications: 


Active Medications











Generic Name Dose Route Start Last Admin





  Trade Name Freq  PRN Reason Stop Dose Admin


 


Alprazolam  0.5 mg  12/13/20 10:39  12/13/20 17:36





  Alprazolam 0.5 Mg Tab  PO   0.5 mg





  Q6H PRN   Administration





  Anxiety  


 


Amlodipine Besylate  10 mg  12/14/20 09:00  12/14/20 09:31





  Amlodipine 10 Mg Tab  PO   10 mg





  DAILY OWEN   Administration


 


Cholestyramine Resin  4 gm  12/14/20 09:00  12/14/20 08:43





  Cholestyramine/Aspartame 4 Gm Packet  PO   4 gm





  DAILY OWEN   Administration


 


Famotidine  20 mg  12/12/20 21:00  12/14/20 09:52





  Famotidine/Pf 20 Mg/2ml Vial  SLOW IVP   20 mg





  Q12HR OWEN   Administration


 


Gabapentin  300 mg  12/13/20 21:00  12/14/20 09:32





  Gabapentin 300 Mg Cap  PO   300 mg





  BID OWEN   Administration


 


Sodium Chloride  1,000 mls @ 75 mls/hr  12/12/20 21:00  12/14/20 09:29





  Normal Saline 0.9%  IV   1,000 mls





  [id number] OWEN   Administration


 


Ipratropium Bromide  2.5 ml  12/13/20 13:00  12/14/20 11:28





  Ipratropium Bromide 2.5 Ml Neb  NEB   2.5 ml





  [id number] OWEN   Administration


 


Morphine Sulfate  4 mg  12/12/20 23:24  12/13/20 08:27





  Morphine 4 Mg/Ml Vial  SLOW IVP   4 mg





  Q3H PRN   Administration





  Pain  


 


Phenazopyridine HCl  100 mg  12/14/20 09:00  12/14/20 16:04





  Phenazopyridine Hcl 100 Mg Tab  PO   Not Given





  PC OWEN  


 


Polyethylene Glycol  17 gm  12/13/20 21:00  12/14/20 09:30





  Polyethylene Glycol 3350 17 Gm Packet  PO   17 gm





  BID OWEN   Administration


 


Sodium Chloride  10 ml  12/13/20 09:00  12/14/20 09:52





  Flush - Normal Saline 10 Ml Syringe  IVF   10 ml





  Q12HR OWEN   Administration


 


Tramadol HCl  50 mg  12/13/20 10:39  12/14/20 09:50





  Tramadol Hcl 50 Mg Tab  PO   50 mg





  TIDPRN PRN   Administration





  Mild-Moderate Pain (1-5)  


 


Trospium  20 mg  12/13/20 21:00  12/14/20 09:30





  Trospium 20 Mg Tab  PO   20 mg





  BID OWEN   Administration














- Exam


Neck: negative: supple, symmetric, no JVD, no thyromegaly, no lymphadenopathy, 

no carotid bruit, JVD


Heart: negative: RRR, no murmur, no gallops, no rubs, normal peripheral pulses, 

irregular, diminshed peripheral pulses, murmur present, II/IV, III/IV


Respiratory: negative: CTAB, no wheezes, no rales, no ronchi, normal chest 

expansion, no tachypnea, normal percussion, rales, rhonchi, tachypneic, wheezes


Gastrointestinal: negative: soft, non-tender, non-distended, normal bowel 

sounds, no palpable masses, no hepatomegaly, no splenomegaly, no bruit, no 

guarding, no rigidity, tender to palpation, distended, diminished bowl sounds, 

voluntary guarding





Hosp A/P


(1) Elevated LFTs


Code(s): R79.89 - OTHER SPECIFIED ABNORMAL FINDINGS OF BLOOD CHEMISTRY   Status:

Acute   





(2) Abdominal pain


Code(s): R10.9 - UNSPECIFIED ABDOMINAL PAIN   Status: Chronic   


Qualifiers: 


   Abdominal location: right upper quadrant   Qualified Code(s): R10.11 - Right 

upper quadrant pain   





(3) COPD (chronic obstructive pulmonary disease)


Status: Chronic   


Qualifiers: 


   COPD type: chronic bronchitis 





(4) Metastatic colon cancer in female


Code(s): C18.9 - MALIGNANT NEOPLASM OF COLON, UNSPECIFIED   Status: Chronic   





(5) Moderate protein-calorie malnutrition


Code(s): E44.0 - MODERATE PROTEIN-CALORIE MALNUTRITION   Status: Chronic   





(6) Schizophrenia


Code(s): F20.9 - SCHIZOPHRENIA, UNSPECIFIED   Status: Chronic   





- Plan





Is a very pleasant 55-year-old female who initially presented to the hospital 

with abdominal pain and was found to have hyperbilirubinemia.  Patient has a 

history of COPD hypertension osteoporosis and schizophrenia was diagnosed with 

colon cancer initially at [facility] in mid 2019.  Patient at this time 

underwent surgery and followed up with oncology for chemotherapy.





Patient recently started having more right upper quadrant abdominal pain at this

time a CT scan indicated possible cholecystitis and patient underwent a 

cholecystectomy.  The pathology from her cholecystectomy indicated metastatic ad

enocarcinoma consistent with colorectal primary.





Patient now comes in with abdominal pain and was noted to have hyper 

bilirubinemia.  Her CT scan indicates significant extrinsic compression of the 

distal intrahepatic biliary tree.  I did speak with GI who feels the patient 

needs to be transferred to higher level care for stenting.





We did initiate transfer.





Also consult urology given her significant hydronephrosis without any renal 

calculi.





12/14 spoke with transfer center they are working on transferring this patient 

to [facility].  Patient is hungry she wants to eat well started on a diet.  

Stents put in right ureter and left ureter.

## 2020-12-14 NOTE — PRG
DATE OF SERVICE:  12/14/2020



REASON FOR CONSULTATION:  Hyperbilirubinemia, history of metastatic colon cancer.



SUBJECTIVE:  The patient did not experience any acute events or problems overnight.

However, she was taken by the Urology Service for evaluation of her ureter and

subsequent stent placement.  Today, she states she is doing well with no nausea,

vomiting, fevers, chills, hematemesis, melena, or hematochezia.  She continues to

have itching, although has only received 1 dose of the cholestyramine thus far. 



OBJECTIVE:  VITAL SIGNS:  Temperature 97.5, pulse 119, blood pressure 150/105,

respiratory rate 18, saturating 98% on room air. 

GENERAL:  The patient was lying in bed, in no acute distress.  Alert and oriented

x4. 

CARDIOVASCULAR:  Tachycardic rate, __________ rhythm. 

RESPIRATORY:  Clear to auscultation bilaterally. 

ABDOMEN:  Normoactive bowel sounds.  Soft, nondistended.  Tenderness to palpation in

the midepigastric, right upper quadrant and right mid abdomen. 

EXTREMITIES:  No cyanosis, clubbing, or edema.



LABORATORY DATA:  Chemistry with a sodium of 136, potassium 3.1, chloride 101, CO2

of 23, BUN 7, creatinine 0.75, glucose 101, , , alkaline phosphatase

1195, total bilirubin 18.9, albumin 2.9. 



ASSESSMENT AND PLAN:  The patient is a 55-year-old  female with past

medical history of chronic obstructive pulmonary disease, hypertension,

osteoporosis, schizophrenia, asthma, and colonic adenocarcinoma with metastatic

disease to the spine, gallbladder, now presumably the liver presenting with

hyperbilirubinemia, most likely secondary to her metastatic disease. 



Hyperbilirubinemia.  The patient initially had a colonoscopy performed in mid 2019

at [facility] in [place], [place], which showed a large near

obstructive mass at approximately 7 to 8 cm past the anal verge.  However, this was

only treated with chemotherapy with no surgical debulking of the tumor.  Since then

she has been followed by the Oncology Service and had multiple rounds of

chemotherapy with the last round being approximately 6 months ago.  However, more

recently, she had been having increased right upper quadrant abdominal pain with CT

scan showing increased size of the peripancreatic and mesenteric lymph nodes

concerning for recurrence of her malignancy.  She also had imaging findings

concerning for the presence of cholecystitis and subsequently underwent

cholecystectomy on November 28, 2020.  Surgical pathology showed the presence of

fiber adipose tissue compatible with the hernial sac, but the actual gallbladder

itself showed evidence of metastatic adenocarcinoma consistent with colorectal

primary.  At this time, and on review of the patient's previous imaging, she has

been having progressively worsening dilation of the intrahepatic tree and is most

likely due to invasive adenocarcinoma with colorectal origin.  At this time, the

patient will need decompression of her biliary tree0.  If she wishes to continue

with chemotherapy as part of treatment of her colonic adenocarcinoma, unfortunately

our capabilities here at this hospital are limited and I would therefore recommend

transferring the patient to a facility with __________ capabilities that is capable

of evaluating the hilum effectively of the biliary tree and probable placement of a

metal stent at that time. 



RECOMMENDATIONS:  

1. We would continue to trend her LFTs.

2. Highly recommend transferring to a facility for evaluation and ERCP with

placement of metal stent. 

3. Continue cholestyramine 4 g daily.

4. We would continue MiraLAX 17 g twice daily, but consider magnesium citrate given

the patient is now experiencing constipation. 

5. We will continue to follow peripherally while the patient is still here.







Job ID:  [id number]

## 2020-12-15 LAB
ALBUMIN SERPL BCG-MCNC: 2.6 G/DL (ref 3.5–5)
ALP SERPL-CCNC: 1118 U/L (ref 40–110)
ALT SERPL W P-5'-P-CCNC: 139 U/L (ref 8–55)
ANION GAP SERPL CALC-SCNC: 12 MMOL/L (ref 10–20)
AST SERPL-CCNC: 164 U/L (ref 5–34)
BASOPHILS # BLD AUTO: 0 THOU/UL (ref 0–0.2)
BASOPHILS NFR BLD AUTO: 0.4 % (ref 0–1)
BILIRUB SERPL-MCNC: 17.3 MG/DL (ref 0.2–1.2)
BUN SERPL-MCNC: 6 MG/DL (ref 9.8–20.1)
CALCIUM SERPL-MCNC: 8.4 MG/DL (ref 7.8–10.44)
CHLORIDE SERPL-SCNC: 100 MMOL/L (ref 98–107)
CO2 SERPL-SCNC: 24 MMOL/L (ref 22–29)
CREAT CL PREDICTED SERPL C-G-VRATE: 62 ML/MIN (ref 70–130)
EOSINOPHIL # BLD AUTO: 1 THOU/UL (ref 0–0.7)
EOSINOPHIL NFR BLD AUTO: 10.5 % (ref 0–10)
GLOBULIN SER CALC-MCNC: 3.6 G/DL (ref 2.4–3.5)
GLUCOSE SERPL-MCNC: 146 MG/DL (ref 70–105)
HGB BLD-MCNC: 10.1 G/DL (ref 12–16)
LYMPHOCYTES # BLD: 0.5 THOU/UL (ref 1.2–3.4)
LYMPHOCYTES NFR BLD AUTO: 5.5 % (ref 21–51)
MAGNESIUM SERPL-MCNC: 2 MG/DL (ref 1.6–2.6)
MCH RBC QN AUTO: 31.5 PG (ref 27–31)
MCV RBC AUTO: 90.5 FL (ref 78–98)
MONOCYTES # BLD AUTO: 0.8 THOU/UL (ref 0.11–0.59)
MONOCYTES NFR BLD AUTO: 8.2 % (ref 0–10)
NEUTROPHILS # BLD AUTO: 7.3 THOU/UL (ref 1.4–6.5)
NEUTROPHILS NFR BLD AUTO: 75.5 % (ref 42–75)
PLATELET # BLD AUTO: 392 THOU/UL (ref 130–400)
POTASSIUM SERPL-SCNC: 3.3 MMOL/L (ref 3.5–5.1)
RBC # BLD AUTO: 3.22 MILL/UL (ref 4.2–5.4)
SODIUM SERPL-SCNC: 133 MMOL/L (ref 136–145)
WBC # BLD AUTO: 9.6 THOU/UL (ref 4.8–10.8)

## 2020-12-15 RX ADMIN — IPRATROPIUM BROMIDE SCH ML: 0.5 SOLUTION RESPIRATORY (INHALATION) at 00:01

## 2020-12-15 RX ADMIN — IPRATROPIUM BROMIDE SCH ML: 0.5 SOLUTION RESPIRATORY (INHALATION) at 18:42

## 2020-12-15 RX ADMIN — Medication SCH: at 09:33

## 2020-12-15 RX ADMIN — Medication SCH: at 22:36

## 2020-12-15 RX ADMIN — IPRATROPIUM BROMIDE SCH ML: 0.5 SOLUTION RESPIRATORY (INHALATION) at 07:14

## 2020-12-15 RX ADMIN — IPRATROPIUM BROMIDE SCH ML: 0.5 SOLUTION RESPIRATORY (INHALATION) at 15:19

## 2020-12-15 NOTE — PDOC.HOSPP
- Subjective


Encounter Date: 12/15/20


Encounter Time: 10:30


Subjective: 


 pt up in bed denies any complains





- Objective


Vital Signs & Weight: 


                             Vital Signs (12 hours)











  Temp Pulse Resp BP BP Pulse Ox


 


 12/15/20 11:30  98.1 F  95  16   170/105 H  99


 


 12/15/20 09:31   94   178/108 H  


 


 12/15/20 07:19  97.8 F  94  16   178/108 H  100


 


 12/15/20 07:14   97  18    98


 


 12/15/20 04:04  98.5 F  101 H  18   164/90 H  97








                                     Weight











Admit Weight                   90 lb


 


Weight                         90 lb














I&O: 


                                        











 12/14/20 12/15/20 12/16/20





 06:59 06:59 06:59


 


Intake Total 2015 2290 


 


Output Total 300  


 


Balance 1715 2290 











Result Diagrams: 


                                 12/15/20 05:35





                                 12/15/20 05:35





Hospitalist ROS





- Review of Systems


Cardiovascular: denies: chest pain, palpitations, orthopnea, paroxysmal noc. 

dyspnea, edema, light headedness, other


Gastrointestinal: denies: nausea, vomiting, abdominal pain, diarrhea, 

constipation, melena, hematochezia, other


Genitourinary: denies: dysuria, frequency, incontinence, hematuria, retention, 

other





- Medication


Medications: 


Active Medications











Generic Name Dose Route Start Last Admin





  Trade Name Freq  PRN Reason Stop Dose Admin


 


Alprazolam  0.5 mg  12/13/20 10:39  12/13/20 17:36





  Alprazolam 0.5 Mg Tab  PO   0.5 mg





  Q6H PRN   Administration





  Anxiety  


 


Amlodipine Besylate  10 mg  12/14/20 09:00  12/15/20 09:31





  Amlodipine 10 Mg Tab  PO   10 mg





  DAILY OWEN   Administration


 


Cholestyramine Resin  4 gm  12/14/20 09:00  12/15/20 08:45





  Cholestyramine/Aspartame 4 Gm Packet  PO   4 gm





  DAILY OWEN   Administration


 


Famotidine  20 mg  12/15/20 09:00  12/15/20 09:31





  Famotidine 20 Mg Tab  PO   20 mg





  BID OWEN   Administration


 


Gabapentin  300 mg  12/13/20 21:00  12/15/20 09:30





  Gabapentin 300 Mg Cap  PO   300 mg





  BID OWEN   Administration


 


Ipratropium Bromide  2.5 ml  12/13/20 13:00  12/15/20 07:14





  Ipratropium Bromide 2.5 Ml Neb  NEB   2.5 ml





  [id number] OWEN   Administration


 


Morphine Sulfate  4 mg  12/12/20 23:24  12/14/20 18:41





  Morphine 4 Mg/Ml Vial  SLOW IVP   4 mg





  Q3H PRN   Administration





  Pain  


 


Phenazopyridine HCl  100 mg  12/14/20 09:00  12/15/20 12:50





  Phenazopyridine Hcl 100 Mg Tab  PO   100 mg





  PC OWEN   Administration


 


Polyethylene Glycol  17 gm  12/13/20 21:00  12/15/20 09:29





  Polyethylene Glycol 3350 17 Gm Packet  PO   17 gm





  BID OWEN   Administration


 


Sodium Chloride  10 ml  12/13/20 09:00  12/15/20 09:33





  Flush - Normal Saline 10 Ml Syringe  IVF   Not Given





  Q12HR OWEN  


 


Tramadol HCl  50 mg  12/13/20 10:39  12/15/20 09:33





  Tramadol Hcl 50 Mg Tab  PO   50 mg





  TIDPRN PRN   Administration





  Mild-Moderate Pain (1-5)  


 


Trospium  20 mg  12/13/20 21:00  12/15/20 09:30





  Trospium 20 Mg Tab  PO   20 mg





  BID OWEN   Administration














- Exam


Neck: negative: supple, symmetric, no JVD, no thyromegaly, no lymphadenopathy, 

no carotid bruit, JVD


Heart: negative: RRR, no murmur, no gallops, no rubs, normal peripheral pulses, 

irregular, diminshed peripheral pulses, murmur present, II/IV, III/IV


Respiratory: negative: CTAB, no wheezes, no rales, no ronchi, normal chest 

expansion, no tachypnea, normal percussion, rales, rhonchi, tachypneic, wheezes


Gastrointestinal: negative: soft, non-tender, non-distended, normal bowel 

sounds, no palpable masses, no hepatomegaly, no splenomegaly, no bruit, no 

guarding, no rigidity, tender to palpation, distended, diminished bowl sounds, 

voluntary guarding





Hosp A/P


(1) Elevated LFTs


Code(s): R79.89 - OTHER SPECIFIED ABNORMAL FINDINGS OF BLOOD CHEMISTRY   Status:

Acute   





(2) Abdominal pain


Code(s): R10.9 - UNSPECIFIED ABDOMINAL PAIN   Status: Chronic   


Qualifiers: 


   Abdominal location: right upper quadrant   Qualified Code(s): R10.11 - Right 

upper quadrant pain   





(3) COPD (chronic obstructive pulmonary disease)


Status: Chronic   


Qualifiers: 


   COPD type: chronic bronchitis 





(4) Metastatic colon cancer in female


Code(s): C18.9 - MALIGNANT NEOPLASM OF COLON, UNSPECIFIED   Status: Chronic   





(5) Moderate protein-calorie malnutrition


Code(s): E44.0 - MODERATE PROTEIN-CALORIE MALNUTRITION   Status: Chronic   





(6) Schizophrenia


Code(s): F20.9 - SCHIZOPHRENIA, UNSPECIFIED   Status: Chronic   





- Plan





Is a very pleasant 55-year-old female who initially presented to the hospital 

with abdominal pain and was found to have hyperbilirubinemia.  Patient has a 

history of COPD hypertension osteoporosis and schizophrenia was diagnosed with 

colon cancer initially at [facility] in mid 2019.  Patient at this time 

underwent surgery and followed up with oncology for chemotherapy.





Patient recently started having more right upper quadrant abdominal pain at this

time a CT scan indicated possible cholecystitis and patient underwent a 

cholecystectomy.  The pathology from her cholecystectomy indicated metastatic 

adenocarcinoma consistent with colorectal primary.





Patient now comes in with abdominal pain and was noted to have hyper 

bilirubinemia.  Her CT scan indicates significant extrinsic compression of the 

distal intrahepatic biliary tree.  I did speak with GI who feels the patient 

needs to be transferred to higher level care for stenting.





We did initiate transfer.





Also consult urology given her significant hydronephrosis without any renal 

calculi.





12/14 spoke with transfer center they are working on transferring this patient 

to [facility].  Patient is hungry she wants to eat well started on a diet.  

Stents put in right ureter and left ureter.





12/15 spoke with transfer center who states that they are working on getting pt 

to [facility].  Patient is tolerating her diet.  She had a bowel movement

## 2020-12-15 NOTE — PRG
DATE OF SERVICE:  12/15/2020



SUBJECTIVE:  Ms. Rojas feels well.  She is eating.  She has no fever or chills.



OBJECTIVE:  VITAL SIGNS:  Pulse 116, temperature 98, blood pressure 134/116. 

ABDOMEN:  Soft, nontender.



ASSESSMENT:  

1. Metastatic colon cancer with metastasis to the fabiola hepatis, status post

cholecystectomy, covered in cancer. 

2. Biliary obstruction, likely malignant, hilar.  No signs of cholangitis.



PLAN:  Agree with plans to transfer to [place] for attempted stenting of hilar

stricture.  This is beyond normal capabilities of our Gastroenterology team here and

we do not have the long uncoated stents available.  We will follow from a distance. 







Job ID:  [id number]

## 2020-12-16 LAB
ANION GAP SERPL CALC-SCNC: 13 MMOL/L (ref 10–20)
BUN SERPL-MCNC: 6 MG/DL (ref 9.8–20.1)
CALCIUM SERPL-MCNC: 8.7 MG/DL (ref 7.8–10.44)
CHLORIDE SERPL-SCNC: 98 MMOL/L (ref 98–107)
CO2 SERPL-SCNC: 24 MMOL/L (ref 22–29)
CREAT CL PREDICTED SERPL C-G-VRATE: 59 ML/MIN (ref 70–130)
GLUCOSE SERPL-MCNC: 117 MG/DL (ref 70–105)
MAGNESIUM SERPL-MCNC: 2.2 MG/DL (ref 1.6–2.6)
POTASSIUM SERPL-SCNC: 4.1 MMOL/L (ref 3.5–5.1)
SODIUM SERPL-SCNC: 131 MMOL/L (ref 136–145)

## 2020-12-16 RX ADMIN — IPRATROPIUM BROMIDE SCH ML: 0.5 SOLUTION RESPIRATORY (INHALATION) at 07:50

## 2020-12-16 RX ADMIN — IPRATROPIUM BROMIDE SCH ML: 0.5 SOLUTION RESPIRATORY (INHALATION) at 18:17

## 2020-12-16 RX ADMIN — IPRATROPIUM BROMIDE SCH ML: 0.5 SOLUTION RESPIRATORY (INHALATION) at 00:32

## 2020-12-16 RX ADMIN — Medication SCH ML: at 09:00

## 2020-12-16 RX ADMIN — Medication SCH ML: at 20:27

## 2020-12-16 RX ADMIN — IPRATROPIUM BROMIDE SCH ML: 0.5 SOLUTION RESPIRATORY (INHALATION) at 13:45

## 2020-12-16 NOTE — PDOC.GSPN
Surgery Progress Note: Subj





- Subjective


Narrative: 





Patient with biliary obstruction likely due to direct invasion by colon cancer. 

She is status post laparoscopic cholecystectomy; the obstructed gallbladder was 

divided as soon as it began to narrow at the gallbladder neck due to increasing 

abnormality of the surrounding tissues which was proven on pathology to be 

metastatic colon cancer as suspected.  On CT the clips from the cholecystectomy 

are clearly lateral to the course of the patent common hepatic and common bile 

duct both of which are clearly visible along their entire length.  The 

obstruction appears to be at the level of the hepatic duct which appears 

abnormally thickened and is likely involved with the same metastatic process 

which was noted to be involving the neck of the gallbladder.  Unfortunately 

there is nothing surgical to be offered this patient.  She will require transfer

for ERCP and long uncoated stent for palliation, or percutaneous drainage of the

ducts by interventional radiology above the level of the obstruction if this is 

not possible.  Signing off.





Surgery Progress Note: Obj





- Vital signs


Vital signs: 


                            Vital Signs - Most Recent











Temp Pulse Resp BP Pulse Ox


 


 98.8 F   97   16   153/87 H  100 


 


 12/16/20 03:07  12/16/20 07:50  12/16/20 07:50  12/16/20 03:07  12/16/20 07:50














Surgery Progress Note: Results





- Labs


Result Diagrams: 


                                 12/15/20 05:35





                                 12/15/20 05:35

## 2020-12-16 NOTE — PDOC.HOSPP
- Subjective


Encounter Date: 12/16/20


Encounter Time: 10:30


Subjective: 


pt up in bed does not feel well today. she cannot tell me what she feels. 





- Objective


Vital Signs & Weight: 


                             Vital Signs (12 hours)











  Temp Pulse Resp BP BP Pulse Ox


 


 12/16/20 13:45   100  16   


 


 12/16/20 12:30  98.4 F  103 H  18   139/88  98


 


 12/16/20 10:31   97   137/87  


 


 12/16/20 08:59   97   158/105 H  


 


 12/16/20 08:45       100


 


 12/16/20 07:50   97  16    100


 


 12/16/20 07:33  98.6 F  96  20   158/105 H  100








                                     Weight











Admit Weight                   90 lb


 


Weight                         90 lb














I&O: 


                                        











 12/15/20 12/16/20 12/17/20





 06:59 06:59 06:59


 


Intake Total 2290 2300 


 


Balance 2290 2300 











Result Diagrams: 


                                 12/15/20 05:35





                                 12/15/20 05:35





Hospitalist ROS





- Review of Systems


Respiratory: denies: cough, dry, shortness of breath, hemoptysis, SOB with excer

tion, pleuritic pain, sputum, wheezing, other


Cardiovascular: denies: chest pain, palpitations, orthopnea, paroxysmal noc. 

dyspnea, edema, light headedness, other


Gastrointestinal: denies: nausea, vomiting, abdominal pain, diarrhea, 

constipation, melena, hematochezia, other





- Medication


Medications: 


Active Medications











Generic Name Dose Route Start Last Admin





  Trade Name Freq  PRN Reason Stop Dose Admin


 


Alprazolam  0.5 mg  12/13/20 10:39  12/16/20 13:57





  Alprazolam 0.5 Mg Tab  PO   0.5 mg





  Q6H PRN   Administration





  Anxiety  


 


Amlodipine Besylate  10 mg  12/14/20 09:00  12/16/20 08:59





  Amlodipine 10 Mg Tab  PO   10 mg





  DAILY OWEN   Administration


 


Cholestyramine Resin  4 gm  12/14/20 09:00  12/16/20 08:25





  Cholestyramine/Aspartame 4 Gm Packet  PO   4 gm





  DAILY OWEN   Administration


 


Famotidine  20 mg  12/15/20 09:00  12/16/20 08:59





  Famotidine 20 Mg Tab  PO   20 mg





  BID OWEN   Administration


 


Gabapentin  300 mg  12/13/20 21:00  12/16/20 08:58





  Gabapentin 300 Mg Cap  PO   300 mg





  BID OWEN   Administration


 


Hydralazine HCl  50 mg  12/16/20 09:00  12/16/20 10:31





  Hydralazine 25 Mg Tab  PO   50 mg





  BID OWEN   Administration


 


Ipratropium Bromide  2.5 ml  12/13/20 13:00  12/16/20 13:45





  Ipratropium Bromide 2.5 Ml Neb  NEB   2.5 ml





  [id number] OWEN   Administration


 


Morphine Sulfate  4 mg  12/12/20 23:24  12/16/20 09:15





  Morphine 4 Mg/Ml Vial  SLOW IVP   4 mg





  Q3H PRN   Administration





  Pain  


 


Phenazopyridine HCl  100 mg  12/14/20 09:00  12/16/20 13:38





  Phenazopyridine Hcl 100 Mg Tab  PO   100 mg





  PC OWEN   Administration


 


Polyethylene Glycol  17 gm  12/13/20 21:00  12/16/20 08:56





  Polyethylene Glycol 3350 17 Gm Packet  PO   Not Given





  BID OWEN  


 


Sodium Chloride  10 ml  12/13/20 09:00  12/16/20 09:00





  Flush - Normal Saline 10 Ml Syringe  IVF   10 ml





  Q12HR OWEN   Administration


 


Tramadol HCl  50 mg  12/13/20 10:39  12/16/20 08:09





  Tramadol Hcl 50 Mg Tab  PO   50 mg





  TIDPRN PRN   Administration





  Mild-Moderate Pain (1-5)  


 


Trospium  20 mg  12/13/20 21:00  12/16/20 09:00





  Trospium 20 Mg Tab  PO   20 mg





  BID OWEN   Administration














- Exam


Neck: negative: supple, symmetric, no JVD, no thyromegaly, no lymphadenopathy, 

no carotid bruit, JVD


Heart: negative: RRR, no murmur, no gallops, no rubs, normal peripheral pulses, 

irregular, diminshed peripheral pulses, murmur present, II/IV, III/IV


Respiratory: negative: CTAB, no wheezes, no rales, no ronchi, normal chest 

expansion, no tachypnea, normal percussion, rales, rhonchi, tachypneic, wheezes





Hosp A/P


(1) Elevated LFTs


Code(s): R79.89 - OTHER SPECIFIED ABNORMAL FINDINGS OF BLOOD CHEMISTRY   Status:

Acute   





(2) Abdominal pain


Code(s): R10.9 - UNSPECIFIED ABDOMINAL PAIN   Status: Chronic   


Qualifiers: 


   Abdominal location: right upper quadrant   Qualified Code(s): R10.11 - Right 

upper quadrant pain   





(3) COPD (chronic obstructive pulmonary disease)


Status: Chronic   


Qualifiers: 


   COPD type: chronic bronchitis 





(4) Metastatic colon cancer in female


Code(s): C18.9 - MALIGNANT NEOPLASM OF COLON, UNSPECIFIED   Status: Chronic   





(5) Moderate protein-calorie malnutrition


Code(s): E44.0 - MODERATE PROTEIN-CALORIE MALNUTRITION   Status: Chronic   





(6) Schizophrenia


Code(s): F20.9 - SCHIZOPHRENIA, UNSPECIFIED   Status: Chronic   





- Plan





Is a very pleasant 55-year-old female who initially presented to the hospital 

with abdominal pain and was found to have hyperbilirubinemia.  Patient has a 

history of COPD hypertension osteoporosis and schizophrenia was diagnosed with 

colon cancer initially at [facility] in mid 2019.  Patient at this time 

underwent surgery and followed up with oncology for chemotherapy.





Patient recently started having more right upper quadrant abdominal pain at this

time a CT scan indicated possible cholecystitis and patient underwent a 

cholecystectomy.  The pathology from her cholecystectomy indicated metastatic 

adenocarcinoma consistent with colorectal primary.





Patient now comes in with abdominal pain and was noted to have hyper 

bilirubinemia.  Her CT scan indicates significant extrinsic compression of the 

distal intrahepatic biliary tree.  I did speak with GI who feels the patient 

needs to be transferred to higher level care for stenting.





We did initiate transfer.





Also consult urology given her significant hydronephrosis without any renal 

calculi.





12/14 spoke with transfer center they are working on transferring this patient 

to [facility].  Patient is hungry she wants to eat well started on a diet.  

Stents put in right ureter and left ureter.





12/15 spoke with transfer center who states that they are working on getting pt 

to [facility].  Patient is tolerating her diet.  She had a bowel movement





12/16 spoke with transfer center they are going to try to get her transferred to

a higher level of care. she is tolerating her diet. I asked her if she wanted me

to talk with her family she refused. She states that they know whats going on. I

also told her that i spoke with oncology and she will need to follow up with 

them after she gets her biliary stent.

## 2020-12-17 LAB
ALBUMIN SERPL BCG-MCNC: 2.7 G/DL (ref 3.5–5)
ALP SERPL-CCNC: 1111 U/L (ref 40–110)
ALT SERPL W P-5'-P-CCNC: 119 U/L (ref 8–55)
AST SERPL-CCNC: 149 U/L (ref 5–34)
BILIRUB DIRECT SERPL-MCNC: 13.2 MG/DL (ref 0.1–0.3)
BILIRUB SERPL-MCNC: 16.7 MG/DL (ref 0.2–1.2)

## 2020-12-17 RX ADMIN — IPRATROPIUM BROMIDE SCH ML: 0.5 SOLUTION RESPIRATORY (INHALATION) at 11:42

## 2020-12-17 RX ADMIN — IPRATROPIUM BROMIDE SCH ML: 0.5 SOLUTION RESPIRATORY (INHALATION) at 19:17

## 2020-12-17 RX ADMIN — Medication SCH ML: at 08:51

## 2020-12-17 RX ADMIN — Medication SCH ML: at 19:44

## 2020-12-17 RX ADMIN — IPRATROPIUM BROMIDE SCH ML: 0.5 SOLUTION RESPIRATORY (INHALATION) at 07:28

## 2020-12-17 RX ADMIN — IPRATROPIUM BROMIDE SCH ML: 0.5 SOLUTION RESPIRATORY (INHALATION) at 00:27

## 2020-12-17 NOTE — PDOC.HOSPP
- Subjective


Encounter Date: 12/17/20


Encounter Time: 10:30


Subjective: 


Patient up in bed states she does not feel well however unable to tell me 

exactly what she is feeling.





- Objective


Vital Signs & Weight: 


                             Vital Signs (12 hours)











  Temp Pulse Resp BP BP Pulse Ox


 


 12/17/20 14:57   106 H  16    94 L


 


 12/17/20 11:55  98.6 F  115 H  16   117/78  94 L


 


 12/17/20 11:42   101 H  12    95


 


 12/17/20 08:51   104 H   144/94 H  


 


 12/17/20 08:50   104 H    


 


 12/17/20 08:30       100


 


 12/17/20 07:31  98.5 F  104 H  18   144/94 H  100








                                     Weight











Admit Weight                   90 lb


 


Weight                         90 lb














I&O: 


                                        











 12/16/20 12/17/20 12/18/20





 06:59 06:59 06:59


 


Intake Total 2300 1500 


 


Balance 2300 1500 











Result Diagrams: 


                                 12/15/20 05:35





                                 12/16/20 19:23





Hospitalist ROS





- Review of Systems


Cardiovascular: denies: chest pain, palpitations, orthopnea, paroxysmal noc. 

dyspnea, edema, light headedness, other


Gastrointestinal: denies: nausea, vomiting, abdominal pain, diarrhea, 

constipation, melena, hematochezia, other


Genitourinary: denies: dysuria, frequency, incontinence, hematuria, retention, 

other





- Medication


Medications: 


Active Medications











Generic Name Dose Route Start Last Admin





  Trade Name Freq  PRN Reason Stop Dose Admin


 


Alprazolam  0.5 mg  12/13/20 10:39  12/17/20 12:39





  Alprazolam 0.5 Mg Tab  PO   0.5 mg





  Q6H PRN   Administration





  Anxiety  


 


Amlodipine Besylate  10 mg  12/14/20 09:00  12/17/20 08:50





  Amlodipine 10 Mg Tab  PO   10 mg





  DAILY OWEN   Administration


 


Cholestyramine Resin  4 gm  12/14/20 09:00  12/17/20 08:05





  Cholestyramine/Aspartame 4 Gm Packet  PO   4 gm





  DAILY OWEN   Administration


 


Famotidine  20 mg  12/15/20 09:00  12/17/20 08:51





  Famotidine 20 Mg Tab  PO   20 mg





  BID OWEN   Administration


 


Gabapentin  300 mg  12/13/20 21:00  12/17/20 08:50





  Gabapentin 300 Mg Cap  PO   300 mg





  BID OWEN   Administration


 


Hydralazine HCl  50 mg  12/16/20 09:00  12/17/20 08:51





  Hydralazine 25 Mg Tab  PO   50 mg





  BID OWEN   Administration


 


Ipratropium Bromide  2.5 ml  12/13/20 13:00  12/17/20 11:42





  Ipratropium Bromide 2.5 Ml Neb  NEB   2.5 ml





  [id number] OWEN   Administration


 


Morphine Sulfate  4 mg  12/12/20 23:24  12/17/20 12:32





  Morphine 4 Mg/Ml Vial  SLOW IVP   4 mg





  Q3H PRN   Administration





  Pain  


 


Phenazopyridine HCl  100 mg  12/14/20 09:00  12/17/20 12:32





  Phenazopyridine Hcl 100 Mg Tab  PO   100 mg





  PC OWEN   Administration


 


Polyethylene Glycol  17 gm  12/13/20 21:00  12/17/20 08:51





  Polyethylene Glycol 3350 17 Gm Packet  PO   17 gm





  BID OWEN   Administration


 


Sodium Chloride  10 ml  12/13/20 09:00  12/17/20 08:51





  Flush - Normal Saline 10 Ml Syringe  IVF   10 ml





  Q12HR OWEN   Administration


 


Tramadol HCl  50 mg  12/13/20 10:39  12/16/20 08:09





  Tramadol Hcl 50 Mg Tab  PO   50 mg





  TIDPRN PRN   Administration





  Mild-Moderate Pain (1-5)  


 


Trospium  20 mg  12/13/20 21:00  12/17/20 08:51





  Trospium 20 Mg Tab  PO   20 mg





  BID OWEN   Administration














- Exam


Neck: negative: supple, symmetric, no JVD, no thyromegaly, no lymphadenopathy, 

no carotid bruit, JVD


Heart: negative: RRR, no murmur, no gallops, no rubs, normal peripheral pulses, 

irregular, diminshed peripheral pulses, murmur present, II/IV, III/IV


Respiratory: negative: CTAB, no wheezes, no rales, no ronchi, normal chest 

expansion, no tachypnea, normal percussion, rales, rhonchi, tachypneic, wheezes


Gastrointestinal: negative: soft, non-tender, non-distended, normal bowel 

sounds, no palpable masses, no hepatomegaly, no splenomegaly, no bruit, no gua

rding, no rigidity, tender to palpation, distended, diminished bowl sounds, 

voluntary guarding





Hosp A/P


(1) Elevated LFTs


Code(s): R79.89 - OTHER SPECIFIED ABNORMAL FINDINGS OF BLOOD CHEMISTRY   Status:

Acute   





(2) Abdominal pain


Code(s): R10.9 - UNSPECIFIED ABDOMINAL PAIN   Status: Chronic   


Qualifiers: 


   Abdominal location: right upper quadrant   Qualified Code(s): R10.11 - Right 

upper quadrant pain   





(3) COPD (chronic obstructive pulmonary disease)


Status: Chronic   


Qualifiers: 


   COPD type: chronic bronchitis 





(4) Metastatic colon cancer in female


Code(s): C18.9 - MALIGNANT NEOPLASM OF COLON, UNSPECIFIED   Status: Chronic   





(5) Moderate protein-calorie malnutrition


Code(s): E44.0 - MODERATE PROTEIN-CALORIE MALNUTRITION   Status: Chronic   





(6) Schizophrenia


Code(s): F20.9 - SCHIZOPHRENIA, UNSPECIFIED   Status: Chronic   





- Plan





Is a very pleasant 55-year-old female who initially presented to the hospital 

with abdominal pain and was found to have hyperbilirubinemia.  Patient has a 

history of COPD hypertension osteoporosis and schizophrenia was diagnosed with 

colon cancer initially at [facility] in mid 2019.  Patient at this time 

underwent surgery and followed up with oncology for chemotherapy.





Patient recently started having more right upper quadrant abdominal pain at this

time a CT scan indicated possible cholecystitis and patient underwent a 

cholecystectomy.  The pathology from her cholecystectomy indicated metastatic 

adenocarcinoma consistent with colorectal primary.





Patient now comes in with abdominal pain and was noted to have hyper 

bilirubinemia.  Her CT scan indicates significant extrinsic compression of the 

distal intrahepatic biliary tree.  I did speak with GI who feels the patient 

needs to be transferred to higher level care for stenting.





We did initiate transfer.





Also consult urology given her significant hydronephrosis without any renal 

calculi.





12/14 spoke with transfer center they are working on transferring this patient 

to [facility].  Patient is hungry she wants to eat well started on a diet.  

Stents put in right ureter and left ureter.





12/15 spoke with transfer center who states that they are working on getting pt 

to [facility].  Patient is tolerating her diet.  She had a bowel movement





12/16 spoke with transfer center they are going to try to get her transferred to

a higher level of care. she is tolerating her diet. I asked her if she wanted me

to talk with her family she refused. She states that they know whats going on. I

also told her that i spoke with oncology and she will need to follow up with 

them after she gets her biliary stent. 





12/17 spoke with transfer center who is trying to get the patient to [place].  I

talked with the patient again to see if I can talk with her family updated them 

about what is going on she stated that she has updated them and does not want 

anybody to know anything else.  She states that she understands was going on 

with her.

## 2020-12-18 RX ADMIN — Medication SCH: at 22:21

## 2020-12-18 RX ADMIN — IPRATROPIUM BROMIDE SCH ML: 0.5 SOLUTION RESPIRATORY (INHALATION) at 07:39

## 2020-12-18 RX ADMIN — Medication SCH: at 11:07

## 2020-12-18 RX ADMIN — IPRATROPIUM BROMIDE SCH ML: 0.5 SOLUTION RESPIRATORY (INHALATION) at 13:25

## 2020-12-18 RX ADMIN — IPRATROPIUM BROMIDE SCH ML: 0.5 SOLUTION RESPIRATORY (INHALATION) at 19:14

## 2020-12-18 RX ADMIN — IPRATROPIUM BROMIDE SCH ML: 0.5 SOLUTION RESPIRATORY (INHALATION) at 00:04

## 2020-12-18 RX ADMIN — IPRATROPIUM BROMIDE SCH ML: 0.5 SOLUTION RESPIRATORY (INHALATION) at 23:38

## 2020-12-18 NOTE — PDOC.HOSPP
- Subjective


Encounter Date: 12/18/20


Encounter Time: 10:30


Subjective: 


pt up in bed no complains. 





- Objective


Vital Signs & Weight: 


                             Vital Signs (12 hours)











  Temp Pulse Resp BP Pulse Ox


 


 12/18/20 13:25   105 H  18   97


 


 12/18/20 11:30  98.3 F  113 H  17  120/77  97


 


 12/18/20 08:03  98.4 F  107 H  18  138/91 H  97


 


 12/18/20 07:39   82  18   95


 


 12/18/20 04:02  97.7 F  107 H  18  138/88  99








                                     Weight











Admit Weight                   90 lb


 


Weight                         90 lb














I&O: 


                                        











 12/17/20 12/18/20 12/19/20





 06:59 06:59 06:59


 


Intake Total 1500 1100 


 


Balance 1500 1100 











Result Diagrams: 


                                 12/15/20 05:35





                                 12/16/20 19:23





Hospitalist ROS





- Medication


Medications: 


Active Medications











Generic Name Dose Route Start Last Admin





  Trade Name Freq  PRN Reason Stop Dose Admin


 


Alprazolam  0.5 mg  12/13/20 10:39  12/18/20 11:10





  Alprazolam 0.5 Mg Tab  PO   0.5 mg





  Q6H PRN   Administration





  Anxiety  


 


Amlodipine Besylate  10 mg  12/14/20 09:00  12/18/20 11:02





  Amlodipine 10 Mg Tab  PO   10 mg





  DAILY OWEN   Administration


 


Cholestyramine Resin  4 gm  12/14/20 09:00  12/18/20 11:03





  Cholestyramine/Aspartame 4 Gm Packet  PO   Not Given





  DAILY OWEN  


 


Famotidine  20 mg  12/15/20 09:00  12/18/20 11:02





  Famotidine 20 Mg Tab  PO   20 mg





  BID OWEN   Administration


 


Gabapentin  300 mg  12/13/20 21:00  12/18/20 11:01





  Gabapentin 300 Mg Cap  PO   300 mg





  BID OWEN   Administration


 


Hydralazine HCl  50 mg  12/16/20 09:00  12/18/20 11:01





  Hydralazine 25 Mg Tab  PO   50 mg





  BID OWEN   Administration


 


Sodium Chloride  1,000 mls @ 50 mls/hr  12/17/20 19:45  12/17/20 19:43





  Normal Saline 0.9%  IV   1,000 mls





  .Q20H OWEN   Administration


 


Ipratropium Bromide  2.5 ml  12/13/20 13:00  12/18/20 13:25





  Ipratropium Bromide 2.5 Ml Neb  NEB   2.5 ml





  [id number] OWEN   Administration


 


Metoprolol Tartrate  12.5 mg  12/17/20 21:00  12/18/20 11:02





  Metoprolol Tartrate 25 Mg Tab  PO   12.5 mg





  BID OWEN   Administration


 


Morphine Sulfate  4 mg  12/12/20 23:24  12/18/20 13:54





  Morphine 4 Mg/Ml Vial  SLOW IVP   4 mg





  Q3H PRN   Administration





  Pain  


 


Phenazopyridine HCl  100 mg  12/14/20 09:00  12/18/20 13:54





  Phenazopyridine Hcl 100 Mg Tab  PO   100 mg





  PC OWEN   Administration


 


Polyethylene Glycol  17 gm  12/13/20 21:00  12/18/20 11:07





  Polyethylene Glycol 3350 17 Gm Packet  PO   Not Given





  BID OWEN  


 


Sodium Chloride  10 ml  12/13/20 09:00  12/18/20 11:07





  Flush - Normal Saline 10 Ml Syringe  IVF   Not Given





  Q12HR OWEN  


 


Trospium  20 mg  12/13/20 21:00  12/18/20 11:02





  Trospium 20 Mg Tab  PO   20 mg





  BID OWEN   Administration














Hosp A/P


(1) Elevated LFTs


Code(s): R79.89 - OTHER SPECIFIED ABNORMAL FINDINGS OF BLOOD CHEMISTRY   Status:

Acute   





(2) Abdominal pain


Code(s): R10.9 - UNSPECIFIED ABDOMINAL PAIN   Status: Chronic   


Qualifiers: 


   Abdominal location: right upper quadrant   Qualified Code(s): R10.11 - Right 

upper quadrant pain   





(3) COPD (chronic obstructive pulmonary disease)


Status: Chronic   


Qualifiers: 


   COPD type: chronic bronchitis 





(4) Metastatic colon cancer in female


Code(s): C18.9 - MALIGNANT NEOPLASM OF COLON, UNSPECIFIED   Status: Chronic   





(5) Moderate protein-calorie malnutrition


Code(s): E44.0 - MODERATE PROTEIN-CALORIE MALNUTRITION   Status: Chronic   





(6) Schizophrenia


Code(s): F20.9 - SCHIZOPHRENIA, UNSPECIFIED   Status: Chronic   





- Plan





Is a very pleasant 55-year-old female who initially presented to the hospital 

with abdominal pain and was found to have hyperbilirubinemia.  Patient has a 

history of COPD hypertension osteoporosis and schizophrenia was diagnosed with 

colon cancer initially at [facility] in mid 2019.  Patient at this time und

erwent surgery and followed up with oncology for chemotherapy.





Patient recently started having more right upper quadrant abdominal pain at this

time a CT scan indicated possible cholecystitis and patient underwent a 

cholecystectomy.  The pathology from her cholecystectomy indicated metastatic 

adenocarcinoma consistent with colorectal primary.





Patient now comes in with abdominal pain and was noted to have hyper 

bilirubinemia.  Her CT scan indicates significant extrinsic compression of the 

distal intrahepatic biliary tree.  I did speak with GI who feels the patient 

needs to be transferred to higher level care for stenting.





We did initiate transfer.





Also consult urology given her significant hydronephrosis without any renal 

calculi.





12/14 spoke with transfer center they are working on transferring this patient 

to [facility].  Patient is hungry she wants to eat well started on a diet.  

Stents put in right ureter and left ureter.





12/15 spoke with transfer center who states that they are working on getting pt 

to [facility].  Patient is tolerating her diet.  She had a bowel movement





12/16 spoke with transfer center they are going to try to get her transferred to

a higher level of care. she is tolerating her diet. I asked her if she wanted me

to talk with her family she refused. She states that they know whats going on. I

also told her that i spoke with oncology and she will need to follow up with 

them after she gets her biliary stent. 





12/17 spoke with transfer center who is trying to get the patient to [place].  I

talked with the patient again to see if I can talk with her family updated them 

about what is going on she stated that she has updated them and does not want an

ybody to know anything else.  She states that she understands was going on with 

her.





12/18 spoke with transfer center again patient awaiting placement to [place].  

We will continue to follow along.  We will continue IV fluids.  Patient eating 

but not as much.  She has had bowel movements.

## 2020-12-19 VITALS — SYSTOLIC BLOOD PRESSURE: 130 MMHG | DIASTOLIC BLOOD PRESSURE: 87 MMHG | TEMPERATURE: 98.2 F

## 2020-12-19 RX ADMIN — Medication SCH: at 09:58

## 2020-12-19 RX ADMIN — IPRATROPIUM BROMIDE SCH ML: 0.5 SOLUTION RESPIRATORY (INHALATION) at 12:55

## 2020-12-19 RX ADMIN — IPRATROPIUM BROMIDE SCH ML: 0.5 SOLUTION RESPIRATORY (INHALATION) at 06:38

## 2020-12-19 NOTE — EKG
Test Reason : 

Blood Pressure : ***/*** mmHG

Vent. Rate : 101 BPM     Atrial Rate : 101 BPM

   P-R Int : 130 ms          QRS Dur : 074 ms

    QT Int : 342 ms       P-R-T Axes : [phone] degrees

   QTc Int : 443 ms

 

Sinus tachycardia

Right atrial enlargement

Nonspecific ST and T wave abnormality

Abnormal ECG

 

Confirmed by SUSHANT PICHARDO DO ([id number]),  ADAMS STRICKLAND ([id number]) on 12/19/2020 10:57:50 AM

 

Referred By:             Confirmed By:SUSHANT PICHARDO DO

## 2020-12-19 NOTE — PDOC.DS.DS
Provider





- Provider


Date of Admission: 


12/12/20 21:04





Date of Discharge: 12/19/20


Admitting Provider: 


Mag Mcpherson MD





Consultations: Gastroentrology


Primary Care Physician: 


Jl Lewis MD








Course





- Hospital Course


Hospital Course: 


HISTORY OF PRESENT ILLNESS AND BRIEF HOSPITAL COURSE:


The patient is unfortunate 55 years old -American female who has 

significant past medical history of metastatic colon cancer, who initially pre

sented to the hospital with complaint abdominal pain.  She was found to have 

elevated bilirubin.  She was diagnosed with colon cancer initially at [facility] 

[place] in May 2019.  Patient subsequently underwent cholecystectomy.  

Pathology came back indicating metastatic adenocarcinoma consistent with 

colorectal as primary. Initial work-up in the ED, she was found to have primary 

direct hyperbilirubinemia.  CT showed normal common bile duct, but also diffuse 

worsening of the ductal dilatations of the left biliary system.  Also have 

moderate hydronephrosis secondary to metastatic disease.  Surgery, GI, and 

urology were consulted.  Patient underwent ureteral stent placement.  She 

tolerated procedure well. She had no evidence of cholangitis.  Patient was seen 

by GI, and recommend to transfer the patient to tertiary care, for biliary stent

placement, which cannot be done at our facility.  Plan of care was discussed 

with the patient, she agreeable to transfer, risks and benefits of transfer 

discussed with the patient in detail. Patient has been accepted to [facility] facility 

in [place], [place], for higher level of care.





PROCEDURE PERFORMED: 


Bilateral retrograde pyelography and cystoscopy.  Cystoscopy with bilateral 

ureteral stents placement





DISCHARGE CONDITION: STABLE 





DISPOSITION: Transfer to [facility] facility, [place] for higher level of cares.





Imaging studies:


CT abdomen pelvic: Interval worsening of the intrahepatic biliary dilation, 

especially within the left lobe, with some mild remaining in the nondilated 

common bile ducts.  Interval cholecystectomy.  Interval development of moderate 

left hydronephrosis.  High-grade obstruction in the mid left ureter, possibly 

related to ureteral lesion or compressions from adjacent adenopathy which is not

impressive by size.  Lytic metastatic posterior aspect of L3 vertebral body with

compromise of the central spinal canal is stable.





PHYSICAL EXAM:





General Appearance: Alert, oriented, resting comfortably, no apparent distress, 

well developed/nourished.


HEENT: Normocephalic/atraumatic, moist mucous membrane, normal ENT inspection, 

normal tones.  PERRLA, sclera anicteric


Neck: Supple, normal inspection, no JVD


Respiratory: Lungs are clear bilaterally, normal breath sounds, no accessory 

muscle use


Cardiovascular: Regular rate, regular rhythm, no murmur, no rubs


Abdomen: Soft, nontender, nondistended, normal bowel sounds, no organomegaly, no

guarding no rebound


Back: Normal inspection, no CVA tenderness


Extremities: No clubbing, no cyanosis, no edema


Psych/Mental Status: Normal affect, speech, non-pressured, AAO x 3


Neurologic: CN II-XII are intact. 


Skin: Warm/Dry, Normal Color, no rashes








DISCHARGE TIME SPENT: >30 MINUTES 





Resuscitation Status: 








12/12/20 20:59


Resuscitation Status Routine 


   Resuscitation Status: FULL: Full Resuscitation











- Labs


Lab Results: 


                                        





                                 12/15/20 05:35 





                                 12/16/20 19:23 





Microbiology - Entire Visit





12/13/20 20:02   Renal Pelvis - Left   Urine Culture - Final


                            NO GROWTH AT 36 HOURS











- Physical Exam


Vitals: 


                             Vital Signs (12 hours)











  Temp Pulse Resp BP Pulse Ox


 


 12/19/20 11:31  98.2 F  114 H  14  130/87  96


 


 12/19/20 08:53  98.6 F  108 H  12  125/85  95


 


 12/19/20 06:38   102 H  18   96


 


 12/19/20 03:51  98.7 F  96  16  117/74  96








                                     Weight











Admit Weight                   90 lb


 


Weight                         90 lb














Physical Exam: 


The patient was seen and examined on the day of discharge.








Problem





- Problem


(1) Obstructive hyperbilirubinemia


Code(s): K83.1 - OBSTRUCTION OF BILE DUCT   Status: Acute   





(2) Obstructive uropathy


Code(s): N13.9 - OBSTRUCTIVE AND REFLUX UROPATHY, UNSPECIFIED   Status: Acute   





(3) Elevated LFTs


Code(s): R79.89 - OTHER SPECIFIED ABNORMAL FINDINGS OF BLOOD CHEMISTRY   Status:

Acute   





(4) Generalized weakness


Code(s): R53.1 - WEAKNESS   Status: Acute   





(5) COPD (chronic obstructive pulmonary disease)


Status: Chronic   


Qualifiers: 


   COPD type: chronic bronchitis 





(6) HTN (hypertension)


Code(s): I10 - ESSENTIAL (PRIMARY) HYPERTENSION   Status: Chronic   


Qualifiers: 


   Hypertension type: essential hypertension   Qualified Code(s): I10 - Ess

ential (primary) hypertension   





(7) Malignant neoplasm of overlapping sites of colon


Code(s): C18.8 - MALIGNANT NEOPLASM OF OVERLAPPING SITES OF COLON   Status: 

Chronic   





(8) Metastatic colon cancer in female


Code(s): C18.9 - MALIGNANT NEOPLASM OF COLON, UNSPECIFIED   Status: Chronic   





(9) Moderate protein-calorie malnutrition


Code(s): E44.0 - MODERATE PROTEIN-CALORIE MALNUTRITION   Status: Chronic   





(10) Schizophrenia


Code(s): F20.9 - SCHIZOPHRENIA, UNSPECIFIED   Status: Chronic   





Plan





- Discharge Medications


Home Medications: 


                                        











 Medication  Instructions  Recorded  Confirmed  Type


 


Albuterol Sulfate HFA (OR) 2 puff INH Q6H PRN #1 inh 09/05/19 12/12/20 Rx





[Proventil Hfa (or)]    


 


Alendronate Sodium [Fosamax] 70 mg PO Q7D 09/10/19 12/12/20 History


 


ALPRAZolam [Xanax] 0.5 mg PO Q6H PRN 11/10/20 12/12/20 History


 


Gabapentin [Neurontin] 300 mg PO BID 11/10/20 12/12/20 History


 


traMADol HCl [Tramadol HCl] 50 mg PO PRN PRN 11/10/20 12/12/20 History


 


Amlodipine [Norvasc] 10 mg PO DAILY 11/21/20 12/12/20 History


 


Polyethylene Glycol 3350 [Miralax] 17 gm PO BID 11/21/20 12/12/20 History


 


Tiotropium Bromide [Spiriva] 18 mcg IH DAILY 11/21/20 12/12/20 History


 


traMADol HCl [Tramadol HCl] 50 mg PO TID PRN #15 tablet 11/30/20 12/12/20 Rx











Allergies: 








No Known Drug Allergies Allergy (Verified 11/10/20 22:32)


   








- Discharge Instructions


Activity:: Activity as Tolerated





- Follow up Plan


Referrals: 


Jl Lewis MD [Primary Care Provider] - 


Disposition: OTHER HOSPITAL INPT





Quality





- Care Measures


CORE MEASURES:: N/A

## 2021-03-03 NOTE — PDOC.HOSPP
- Subjective


Encounter Date: 11/14/20


Encounter Time: 09:00


Subjective: 


Patient unchanged. Persistent significant TTP RUQ. No fever. Tolerated diet 

yesterday. Repeat U/S this AM without enough fluid to drain and surgery now 

thinks the pain is due to carcinomatosis and recurrent tumor in this area. May 

eventually need a colostomy but no obstruction at this time.





- Objective


Vital Signs & Weight: 


                             Vital Signs (12 hours)











  Temp Pulse Resp BP BP Pulse Ox


 


 11/14/20 07:31  97.8 F  83  16   125/79  100


 


 11/14/20 03:37  98.2 F  69  14   103/60  100


 


 11/13/20 22:01   73   132/85  








                                     Weight











Admit Weight                   90 lb


 


Weight                         91 lb 6.4 oz














I&O: 


                                        











 11/13/20 11/14/20 11/15/20





 06:59 06:59 06:59


 


Intake Total 2380 2560 


 


Balance 2380 2560 











Result Diagrams: 


                                 11/14/20 03:49





                                 11/14/20 03:49





Hospitalist ROS





- Review of Systems


Constitutional: denies: fever, chills


Respiratory: denies: cough, shortness of breath


Cardiovascular: denies: chest pain, palpitations


Gastrointestinal: reports: abdominal pain.  denies: nausea, vomiting, diarrhea, 

constipation





- Medication


Medications: 


Active Medications











Generic Name Dose Route Start Last Admin





  Trade Name Freq  PRN Reason Stop Dose Admin


 


Acetaminophen  650 mg  11/10/20 17:07  11/13/20 22:07





  Acetaminophen 325 Mg Tab  PO   650 mg





  Q4H PRN   Administration





  Headache/Fever/Mild Pain (1-3)  


 


Alendronate Sodium  70 mg  11/11/20 09:00  11/11/20 09:31





  Alendronate Sodium 70 Mg Tablet  PO   Not Given





  Q7D formerly [facility]  


 


Amlodipine Besylate  10 mg  11/10/20 21:00  11/13/20 22:01





  Amlodipine 10 Mg Tab  PO   10 mg





  2100 OWEN   Administration


 


Enoxaparin Sodium  40 mg  11/11/20 09:00  11/13/20 09:32





  Enoxaparin Sodium 40 Mg/0.4 Ml Syringe  SC   Not Given





  0900 OWEN  


 


Gabapentin  300 mg  11/11/20 09:00  11/13/20 22:02





  Gabapentin 300 Mg Cap  PO   300 mg





  TID OWEN   Administration


 


Hydrochlorothiazide  25 mg  11/11/20 09:00  11/13/20 09:31





  Hydrochlorothiazide 25 Mg Tab  PO   25 mg





  DAILY OWEN   Administration


 


Ceftriaxone Sodium 2 gm/  100 mls @ 200 mls/hr  11/13/20 09:00  11/13/20 10:29





  Sodium Chloride  IVPB   100 mls





  Q24HR OWEN   Administration


 


Ondansetron HCl  4 mg  11/10/20 17:07  11/12/20 18:01





  Ondansetron Odt 4 Mg Tab  PO   4 mg





  Q6H PRN   Administration





  Nausea/Vomiting  


 


Polyethylene Glycol  17 gm  11/13/20 09:00  11/13/20 09:32





  Polyethylene Glycol 3350 17 Gm Packet  PO   Not Given





  DAILY OWEN  


 


Sodium Chloride  10 ml  11/11/20 09:00  11/13/20 22:02





  Flush - Normal Saline 10 Ml Syringe  IVF   10 ml





  Q12HR OWEN   Administration


 


Tramadol HCl  50 mg  11/11/20 10:24  11/13/20 09:36





  Tramadol Hcl 50 Mg Tab  PO   50 mg





  Q8H PRN   Administration





  Pain  














- Exam


General Appearance: NAD, awake alert


ENT: moist mucosa


Heart: RRR, no murmur, no gallops, no rubs


Respiratory: CTAB, no wheezes, no rales, no ronchi


Gastrointestinal: non-distended, normal bowel sounds


Gastrointestinal - other findings: TTP worst in RUQ>RLQ, guarding in this area 

only


Psychiatric: normal affect, normal behavior, A&O x 3





Hosp A/P


(1) Malignant neoplasm of overlapping sites of colon


Code(s): C18.8 - MALIGNANT NEOPLASM OF OVERLAPPING SITES OF COLON   Status: 

Chronic   





(2) Acute cholecystitis without calculus


Code(s): K81.0 - ACUTE CHOLECYSTITIS   Status: Acute   





(3) Generalized weakness


Code(s): R53.1 - WEAKNESS   Status: Acute   





(4) Hepatitis


Status: Acute   





(5) HTN (hypertension)


Code(s): I10 - ESSENTIAL (PRIMARY) HYPERTENSION   Status: Chronic   


Qualifiers: 


   Hypertension type: essential hypertension   Qualified Code(s): I10 - Es

sential (primary) hypertension   





(6) COPD (chronic obstructive pulmonary disease)


Status: Chronic   


Qualifiers: 


   COPD type: chronic bronchitis 





(7) Tobacco abuse


Code(s): Z72.0 - TOBACCO USE   Status: Chronic   





- Plan





US liver with continued biliary dilitation, thickened GB with mild 

pericholecystic fluid, positive U/S [facility]


HIDA scan positive for cholecystitis. Started Rocephin. 


On review of recent CTs, U/S and colonoscopy reports Dr. Perez and Dr. Antonio 

believe that there is most likely a recurrent colon cancer in this area as the 

primary issue and patient not a candidate for cholecystectomy. May need 

laparoscopy for definitive diagnosis if necessary in the future.


If eating well and pain controlled can likely d/c home on oral antibiotics in 1-

2 days.


Back pain improved and no evidence bony involvement with tumor on recent CT 

scans abd/pelvis. Can f/u about this with PCP. GENERAL SURGERY  CONSULT NOTE  3/3/2021    Physician Consulted: Dr. Kanwal Manuel  Reason for Consult: Recent surgery in November    CC: Lethargy, hypotension    HPI  Farzana Villasenor is a 78 y.o. female who presents from skilled nursing facility for AMS and hypotension. Patient has a significant surgical history of left-sided colectomy in 2003 for perforated diverticulitis. She underwent right-sided colectomy in November 2020 for colon cancer. Afterwards the patient was peritoneal and was reexplored with findings of missed enterotomy which was repaired. After this second surgery experienced a cardiopulmonary arrest, ROSC was achieved and she was intubated. Patient was transferred from [place] to the [facility]. On 11/27 she underwent exploratory laparotomy with small bowel resection plus loop ileostomy plus distal mucous fistula and excision of mesh. Afterwards patient developed an abdominal abscess that required IR drainage on 12/3. Patient underwent a tracheostomy on 12/8. Patient has since been decannulated. She also has history of factor V Leiden on [facility]. Patient is not altered today, she reports that her left-sided mucous fistula has had increased output over the last several days. She has felt dehydrated and very thirsty. She denies abdominal pain. She has not had nausea or vomiting. She denies fevers or chills. Past Medical History:   Diagnosis Date    Hyperlipidemia     Hypertension     LBBB (left bundle branch block)     Obesity        Past Surgical History:   Procedure Laterality Date    APPENDECTOMY      CHOLECYSTECTOMY      COLOSTOMY  2007    due to divertiulosis since than it was reversed    HERNIA REPAIR      HYSTERECTOMY, TOTAL ABDOMINAL  1990       Medications Prior to Admission:    Prior to Admission medications    Medication Sig Start Date End Date Taking?  Authorizing Provider   ferrous sulfate (IRON 325) 325 (65 Fe) MG tablet ferrous sulfate Ferrous Sulfate Active 325 MG Oral Daily After A Meal 100 October 23rd, 2020 10:46am 10-  [facility] ([id number]) 10/23/20   Historical Provider, MD   traMADol (ULTRAM) 50 MG tablet Take 50 mg by mouth every 6 hours as needed for Pain.     Historical Provider, MD   apixaban (ELIQUIS) 5 MG TABS tablet Take 1 tablet by mouth 2 times daily 10/2/20   Daisy Stark MD   lovastatin (MEVACOR) 40 MG tablet Take 1 tablet by mouth nightly 8/3/20   ERNESTINE Medeiros CNP   PROAIR  (90 Base) MCG/ACT inhaler Inhale 1 puff into the lungs 4 times daily 6/25/20   Sobeida Polo MD   Cholecalciferol (VITAMIN D3) 25 MCG (1000 UT) CAPS Take by mouth daily    Historical Provider, MD   carvedilol (COREG) 25 MG tablet TAKE 1 TABLET BY MOUTH TWICE DAILY WITH MEALS 6/25/20   Oneyda Galaviz MD   alendronate (FOSAMAX) 70 MG tablet Take 1 tablet by mouth once a week 6/8/20   ERNESTINE Medeiros CNP   metFORMIN (GLUCOPHAGE) 500 MG tablet Take 2 tablets by mouth 2 times daily  Patient taking differently: Take 1,000 mg by mouth 4 times daily  5/27/20   Wes Love MD   furosemide (LASIX) 20 MG tablet Take 20 mg by mouth daily as needed    Historical Provider, MD   budesonide-formoterol (SYMBICORT) 80-4.5 MCG/ACT AERO Inhale 2 puffs into the lungs 2 times daily  Patient taking differently: Inhale 2 puffs into the lungs as needed  3/11/20   Wes Love MD   potassium chloride (KLOR-CON) 10 MEQ extended release tablet Take 1 tablet by mouth every other day 3/11/20   Wes Love MD   hydrALAZINE (APRESOLINE) 50 MG tablet Take 1 tablet by mouth 2 times daily 3/9/20   Wes Love MD   Calcium Carbonate (CALTRATE 600 PO) Take by mouth 2 times daily     Historical Provider, MD       Allergies   Allergen Reactions    Amlodipine      Hives      Cefdinir     E-Mycin [Erythromycin]      Made her dizzy and she didn't feel well    Lisinopril     Seasonal        Family History   Problem Relation Age of Onset    Cancer Mother     Diabetes Father     Kidney Disease Father        Social History     Tobacco Use    Smoking status: Former Smoker     Packs/day: 1.00     Years: 40.00     Pack years: 40.00     Types: Cigarettes     Quit date: 2008     Years since quittin.1    Smokeless tobacco: Never Used   Substance Use Topics    Alcohol use: Yes     Comment: occassionally    Drug use: No         Review of Systems   General ROS: negative for - chills or fever  Hematological and Lymphatic ROS: SEE HPI  Respiratory ROS: SEE HPI  Cardiovascular ROS: no chest pain or dyspnea on exertion  Gastrointestinal ROS: SEE HPI  Genito-Urinary ROS: no dysuria, trouble voiding, or hematuria  Musculoskeletal ROS: negative for - joint swelling or muscle pain      PHYSICAL EXAM:    Vitals:    21 0745   BP: (!) 147/65   Pulse: 79   Resp: 18   Temp: 97.6 °F (36.4 °C)   SpO2: 96%       General Appearance:  awake, alert, oriented, in no acute distress  Skin:  Skin color, texture, turgor normal. No rashes or lesions. Head/face:  NCAT  Eyes:  No gross abnormalities. and Sclera nonicteric  Lungs:  Breathing Pattern: regular, no distress  Heart:  Heart regular rate   Abdomen: Open midline incisional wound extending down into the fascia with good granulation tissue present. Not grossly infected. No purulence. Upper pole and lower pole does track underneath subcutaneous tissue. Right-sided ileostomy pink with brown liquid in bag. Left-sided jejunostomy pink with lots of air and brown fluid in bag.   Abdomen is soft, nondistended, not tender to palpation  Extremities: Extremities warm to touch, pink      LABS:    CBC  Recent Labs     21  1256   WBC 11.7*   HGB 12.4   HCT 38.3        BMP  Recent Labs     21  0607   *   K 3.9   CL 89*   CO2 29   BUN 43*   CREATININE 2.0*   CALCIUM 9.0     Liver Function  Recent Labs     21  1256   BILITOT 1.1   AST 51*   ALT 17   ALKPHOS 101   PROT 8.9*   LABALBU 3.0* the lateral margin of the right lobe of the liver of relative low density in relation to the liver parenchyma measuring 7.7 cm in the anterior to posterior diameter by 2 cm in thickness by 4.5 cm in the superior to inferior diameter. This can represent a more late subacute the subcapsular hematoma of the right lobe of the liver. Patient had previous cholecystectomy. The biliary tree and pancreatic ductal systems are not dilated. There is atrophy and fat replacement of the pancreas. The The spleen has mildly increased size. A the adrenals do not appear to be enlarged. Kidneys are preserved size and cortical thickness, there is no obstruction or renal calculus. Atherosclerotic changes are seen in the abdominal aorta but there is no aneurysm formation. Patient had previous hysterectomy. The ovaries are not seen. Bladder has unremarkable appearance. Lower lung bases demonstrates some areas of linear atelectasis. There is no pleural effusions. The Degenerative changes are seen in the thoracolumbar spine with old the compression deformities forming anterior wedging measuring. 1.  Relatively recent postoperative changes with incomplete closure of the subcutaneous soft tissues overlying the linea alba of the anterior rectus abdominal muscle across the midline. 2.  Multiple previous bowel surgery with overall shortening of the bowel. 3.  Presence of a right and left-sided patent ostomy, ileostomy on the right, jejunostomy on the left. 4.  More late subcapsular hematoma of the right lobe of the liver 7.7 x 2 x 4.5 cm. 5.  Status post cholecystectomy, no dilatation biliary tree pancreatic ductal system. 6.  No obstructive uropathy. 7.  No acute inflammatory changes in the mid mesentery fat planes, free intraperitoneal air or ascites or indication for bowel obstruction.     Ct Head Wo Contrast    Result Date: 3/2/2021  EXAMINATION: CT OF THE HEAD WITHOUT CONTRAST  3/2/2021 3:44 pm TECHNIQUE: CT of the head was performed without the administration of intravenous contrast. Dose modulation, iterative reconstruction, and/or weight based adjustment of the mA/kV was utilized to reduce the radiation dose to as low as reasonably achievable. COMPARISON: None. HISTORY: ORDERING SYSTEM PROVIDED HISTORY: ams TECHNOLOGIST PROVIDED HISTORY: Reason for exam:->ams Has a \"code stroke\" or \"stroke alert\" been called? ->No Decision Support Exception->Emergency Medical Condition (MA) What reading provider will be dictating this exam?->CRC FINDINGS: BRAIN/VENTRICLES: There is no acute intracranial hemorrhage, mass effect or midline shift. No abnormal extra-axial fluid collection. The gray-white differentiation is maintained without evidence of an acute infarct. There is no evidence of hydrocephalus. ORBITS: The visualized portion of the orbits demonstrate no acute abnormality. SINUSES: The visualized paranasal sinuses and mastoid air cells demonstrate no acute abnormality. SOFT TISSUES/SKULL:  No acute abnormality of the visualized skull or soft tissues. No acute intracranial abnormality. Xr Chest Portable    Result Date: 3/2/2021  EXAMINATION: ONE XRAY VIEW OF THE CHEST 3/2/2021 1:53 pm COMPARISON: March 22, 2016 HISTORY: ORDERING SYSTEM PROVIDED HISTORY: hypotension TECHNOLOGIST PROVIDED HISTORY: Reason for exam:->hypotension What reading provider will be dictating this exam?->CRC FINDINGS: Heart size is within normal limits. There are faint reticular airspace opacities at both lung bases, right greater than left. No evidence of a sizable pleural effusion. No pneumothorax. A leftward scoliosis of the thoracic spine is noted. Bones are mildly osteopenic. Faint reticular airspace opacities at both lung bases may be on the basis of pulmonary edema or atypical/viral pneumonia. ASSESSMENT:  78 y.o. female with AMS and hypotension, COLTON and hyponatremia likely secondary to high output ostomy.  Remote left-sided colectomy for perforated diverticulitis and at [place] recent right-sided colectomy for colon cancer complicated by missed enterotomy followed by reexploration and closure of enterotomy transfer to [facility] with ex lap and right-sided loop ileostomy and left-sided mucous fistula jejunostomy.   Open midline wound extending down to abdominal wall    PLAN:    -Nephrology requesting fluid restriction for hyponatremia  -Will add fiber and cholestyramine to slow bowel transit  -Can also potentially add imodium or lomotil if output remains too high  -Midline incisional wound with good granulation tissue, no need for debridement  -If patient requires abdominal surgery while admitted, she would best be cared for at the [facility] where her most recent surgery was    Plan discussed with Dr. Robbie Patel    Electronically signed by Gricelda Trotter DO on 3/3/21 at 10:15 AM EST

## 2022-10-11 NOTE — CT
EXAM:

CT chest, abdomen, and pelvis with IV contrast:



HISTORY:

Colon cancer with metastatic disease to lymph nodes head and neck.



COMPARISON:

4/23/2020



FINDINGS:



CT THORAX:

Lungs: No consolidation, suspicious pulmonary nodule, or mass. Minimal biapical pleural and parenchym
al scarring is present with minimal emphysematous changes present.



Pleura: No pleural effusion.



Lymph nodes: A stable right paratracheal lymph node is again seen measuring 8 mm in short axis dimens
ion. No enlarged lymph nodes are seen by CT size criteria



Mediastinum: The thoracic aorta is normal in caliber with minimal vascular calcifications. 



Chest wall: Left internal jugular vein Mediport catheter remains in place with tip in proximal SVC. L
inear low density is seen along the distal portion of the catheter also seen on prior study which

may related to fibrin sheath.



CT ABDOMEN AND PELVIS:

Liver: There is stable very small subcentimeter hypodense lesion seen in both the right and left hepa
tic lobes. Largest lesion in the peripheral aspect posterior segment right hepatic lobe measures 8

mm which is unchanged in size.

Gallbladder: Decompressed.\

Pancreas: Within normal limits.

Spleen: Within normal limits.



Adrenal glands: Within normal limits.



Kidneys: Cortical scarring is seen at the posterior aspect midportion right kidney with small hypoden
se lesion again seen likely due to cysts in this region. Difficult to characterize hypodense lesion

inferior pole left kidney is also again seen and likely related to a cyst as well.

Urinary Bladder: The urinary bladder is unremarkable. 

Reproductive organs: Evidence of hysterectomy.



Bowel: Loops of small bowel are normal in caliber. Loops of small bowel are seen lateral to the desce
nding colon likely due to prior postsurgical change from internal hernia. There does appear to be

mild wall thickening involving the region of the cecum which is more prominent than on the study of 4
/23/2020 oh was seen on study of 12/20/2019.



Adenopathy:Increased number of aortocaval lymph nodes is again seen and not significant changed jimi
red to the prior study including a right para-aortic lymph node which is near the retrocrural

location and measures 9 mm in short axis dimension. There is a mildly prominent gastrohepatic lymph n
ode measuring 9 mm in short axis dimension. Multiple nodular densities are seen within the anterior

right mid abdomen likely representing multiple lymph nodes which are increased in number. Largest lym
ph node in this region measures 9 mm in short axis dimension which is similar in size to prior

study. There are low-density structures seen at the lateral aspect of the pelvis bilaterally which we
re also seen on prior studies and are likely related to patient's ovaries as opposed to enlarged

lymph nodes.



Peritoneum: There is fluid and inflammatory stranding seen adjacent to the duodenum and extending inf
eriorly into the pelvis. This also mild inflammatory stranding is also seen adjacent to the celiac

and superior mesenteric arteries. The inflammatory stranding/fluid does appear minimally increased wh
en compared to the prior exam. No fluid collection is seen. This fluid and stranding was also

present on a study on 2/24/2020. Again, there does appear to be mild wall thickening of the adjacent 
duodenum. Exact etiology for these findings is uncertain. This could be related to persistent

inflammatory process or posttreatment changes.



Abdominal wall: No abnormalities seen.



Osseous structures: No suspicious lytic or sclerotic osseous lesions are appreciated.



IMPRESSION:



1. Persistent intra-abdominal lymphadenopathy overall similar to prior exam.

2. Persistent fluid and inflammatory stranding seen adjacent to the duodenum as well as adjacent to t
he region of the pancreas and extending into the pelvis. This finding is seen on the prior exam and

was also seen on a study of 12/20/2019. Findings again may be related to inflammatory or infectious p
rocess. Pancreatitis in the correct clinical scenario is a possibility, but this finding is been

present on multiple examinations.

3. Suggested mild nonspecific thickening involving the region of the cecum.

4. Stable scattered subcentimeter hypodense lesions in each lobe the liver which are too small to karthikeyan
racterize.





Reported By: Enzo Arechiga 

Electronically Signed:  7/6/2020 9:48 AM
Xray Pelvis AP only